# Patient Record
Sex: MALE | Race: WHITE | NOT HISPANIC OR LATINO | Employment: OTHER | URBAN - METROPOLITAN AREA
[De-identification: names, ages, dates, MRNs, and addresses within clinical notes are randomized per-mention and may not be internally consistent; named-entity substitution may affect disease eponyms.]

---

## 2023-05-10 ENCOUNTER — HOSPITAL ENCOUNTER (OUTPATIENT)
Dept: GENERAL RADIOLOGY | Facility: HOSPITAL | Age: 78
Discharge: HOME | End: 2023-05-10
Payer: MEDICARE

## 2023-05-10 ENCOUNTER — LAB (OUTPATIENT)
Dept: LAB | Facility: HOSPITAL | Age: 78
End: 2023-05-10
Payer: MEDICARE

## 2023-05-10 ENCOUNTER — HOSPITAL ENCOUNTER (OUTPATIENT)
Dept: CARDIOLOGY | Facility: HOSPITAL | Age: 78
Discharge: HOME | End: 2023-05-10
Payer: MEDICARE

## 2023-05-10 ENCOUNTER — TRANSCRIBE ORDERS (OUTPATIENT)
Dept: ADMINISTRATIVE | Facility: HOSPITAL | Age: 78
End: 2023-05-10
Payer: MEDICARE

## 2023-05-10 DIAGNOSIS — Z01.818 PRE-OP TESTING: ICD-10-CM

## 2023-05-10 DIAGNOSIS — Z01.818 PRE-OP TESTING: Primary | ICD-10-CM

## 2023-05-10 LAB
ALBUMIN SERPL-MCNC: 4 G/DL (ref 3.5–5.2)
ALBUMIN/GLOB SERPL: 1.4 G/DL
ALP SERPL-CCNC: 71 U/L (ref 39–117)
ALT SERPL W P-5'-P-CCNC: 27 U/L (ref 1–41)
ANION GAP SERPL CALCULATED.3IONS-SCNC: 10.1 MMOL/L (ref 5–15)
AST SERPL-CCNC: 25 U/L (ref 1–40)
BACTERIA UR QL AUTO: ABNORMAL /HPF
BASOPHILS # BLD AUTO: 0.03 10*3/MM3 (ref 0–0.2)
BASOPHILS NFR BLD AUTO: 0.3 % (ref 0–1.5)
BILIRUB SERPL-MCNC: 0.7 MG/DL (ref 0–1.2)
BILIRUB UR QL STRIP: NEGATIVE
BUN SERPL-MCNC: 17 MG/DL (ref 8–23)
BUN/CREAT SERPL: 15 (ref 7–25)
CALCIUM SPEC-SCNC: 9.9 MG/DL (ref 8.6–10.5)
CHLORIDE SERPL-SCNC: 106 MMOL/L (ref 98–107)
CLARITY UR: CLEAR
CO2 SERPL-SCNC: 26.9 MMOL/L (ref 22–29)
COLOR UR: YELLOW
CREAT SERPL-MCNC: 1.13 MG/DL (ref 0.76–1.27)
DEPRECATED RDW RBC AUTO: 43.4 FL (ref 37–54)
EGFRCR SERPLBLD CKD-EPI 2021: 66.9 ML/MIN/1.73
EOSINOPHIL # BLD AUTO: 0.11 10*3/MM3 (ref 0–0.4)
EOSINOPHIL NFR BLD AUTO: 1.2 % (ref 0.3–6.2)
ERYTHROCYTE [DISTWIDTH] IN BLOOD BY AUTOMATED COUNT: 12.7 % (ref 12.3–15.4)
GLOBULIN UR ELPH-MCNC: 2.8 GM/DL
GLUCOSE SERPL-MCNC: 97 MG/DL (ref 65–99)
GLUCOSE UR STRIP-MCNC: NEGATIVE MG/DL
HCT VFR BLD AUTO: 43.7 % (ref 37.5–51)
HGB BLD-MCNC: 14.6 G/DL (ref 13–17.7)
HGB UR QL STRIP.AUTO: NEGATIVE
HYALINE CASTS UR QL AUTO: ABNORMAL /LPF
IMM GRANULOCYTES # BLD AUTO: 0.05 10*3/MM3 (ref 0–0.05)
IMM GRANULOCYTES NFR BLD AUTO: 0.5 % (ref 0–0.5)
KETONES UR QL STRIP: ABNORMAL
LEUKOCYTE ESTERASE UR QL STRIP.AUTO: ABNORMAL
LYMPHOCYTES # BLD AUTO: 2.6 10*3/MM3 (ref 0.7–3.1)
LYMPHOCYTES NFR BLD AUTO: 27.4 % (ref 19.6–45.3)
MCH RBC QN AUTO: 30.9 PG (ref 26.6–33)
MCHC RBC AUTO-ENTMCNC: 33.4 G/DL (ref 31.5–35.7)
MCV RBC AUTO: 92.6 FL (ref 79–97)
MONOCYTES # BLD AUTO: 0.72 10*3/MM3 (ref 0.1–0.9)
MONOCYTES NFR BLD AUTO: 7.6 % (ref 5–12)
NEUTROPHILS NFR BLD AUTO: 5.98 10*3/MM3 (ref 1.7–7)
NEUTROPHILS NFR BLD AUTO: 63 % (ref 42.7–76)
NITRITE UR QL STRIP: NEGATIVE
NRBC BLD AUTO-RTO: 0 /100 WBC (ref 0–0.2)
PH UR STRIP.AUTO: 6.5 [PH] (ref 5–8)
PLATELET # BLD AUTO: 265 10*3/MM3 (ref 140–450)
PMV BLD AUTO: 8.6 FL (ref 6–12)
POTASSIUM SERPL-SCNC: 4.4 MMOL/L (ref 3.5–5.2)
PROT SERPL-MCNC: 6.8 G/DL (ref 6–8.5)
PROT UR QL STRIP: ABNORMAL
QT INTERVAL: 391 MS
RBC # BLD AUTO: 4.72 10*6/MM3 (ref 4.14–5.8)
RBC # UR STRIP: ABNORMAL /HPF
REF LAB TEST METHOD: ABNORMAL
SODIUM SERPL-SCNC: 143 MMOL/L (ref 136–145)
SP GR UR STRIP: 1.02 (ref 1–1.03)
SQUAMOUS #/AREA URNS HPF: ABNORMAL /HPF
UROBILINOGEN UR QL STRIP: ABNORMAL
WBC # UR STRIP: ABNORMAL /HPF
WBC NRBC COR # BLD: 9.49 10*3/MM3 (ref 3.4–10.8)

## 2023-05-10 PROCEDURE — 80053 COMPREHEN METABOLIC PANEL: CPT

## 2023-05-10 PROCEDURE — 85025 COMPLETE CBC W/AUTO DIFF WBC: CPT

## 2023-05-10 PROCEDURE — 93005 ELECTROCARDIOGRAM TRACING: CPT | Performed by: ORTHOPAEDIC SURGERY

## 2023-05-10 PROCEDURE — 36415 COLL VENOUS BLD VENIPUNCTURE: CPT

## 2023-05-10 PROCEDURE — 71046 X-RAY EXAM CHEST 2 VIEWS: CPT

## 2023-05-10 PROCEDURE — 81001 URINALYSIS AUTO W/SCOPE: CPT

## 2023-10-18 RX ORDER — LANOLIN ALCOHOL/MO/W.PET/CERES
1000 CREAM (GRAM) TOPICAL DAILY
COMMUNITY

## 2023-10-18 RX ORDER — LOSARTAN POTASSIUM 50 MG/1
100 TABLET ORAL DAILY
COMMUNITY

## 2023-10-18 RX ORDER — FINASTERIDE 5 MG/1
5 TABLET, FILM COATED ORAL DAILY
COMMUNITY

## 2023-10-19 ENCOUNTER — HOSPITAL ENCOUNTER (OUTPATIENT)
Dept: CARDIOLOGY | Facility: HOSPITAL | Age: 78
Discharge: HOME | End: 2023-10-19
Payer: MEDICARE

## 2023-10-19 ENCOUNTER — LAB (OUTPATIENT)
Dept: LAB | Facility: HOSPITAL | Age: 78
End: 2023-10-19
Payer: MEDICARE

## 2023-10-19 LAB
ANION GAP SERPL CALCULATED.3IONS-SCNC: 10 MMOL/L (ref 5–15)
BUN SERPL-MCNC: 16 MG/DL (ref 8–23)
BUN/CREAT SERPL: 13.4 (ref 7–25)
CALCIUM SPEC-SCNC: 9.7 MG/DL (ref 8.6–10.5)
CHLORIDE SERPL-SCNC: 106 MMOL/L (ref 98–107)
CO2 SERPL-SCNC: 27 MMOL/L (ref 22–29)
CREAT SERPL-MCNC: 1.19 MG/DL (ref 0.76–1.27)
DEPRECATED RDW RBC AUTO: 41.3 FL (ref 37–54)
EGFRCR SERPLBLD CKD-EPI 2021: 62.9 ML/MIN/1.73
ERYTHROCYTE [DISTWIDTH] IN BLOOD BY AUTOMATED COUNT: 12.5 % (ref 12.3–15.4)
GLUCOSE SERPL-MCNC: 100 MG/DL (ref 65–99)
HCT VFR BLD AUTO: 47.2 % (ref 37.5–51)
HGB BLD-MCNC: 16.3 G/DL (ref 13–17.7)
MCH RBC QN AUTO: 31.3 PG (ref 26.6–33)
MCHC RBC AUTO-ENTMCNC: 34.5 G/DL (ref 31.5–35.7)
MCV RBC AUTO: 90.8 FL (ref 79–97)
PLATELET # BLD AUTO: 273 10*3/MM3 (ref 140–450)
PMV BLD AUTO: 9.1 FL (ref 6–12)
POTASSIUM SERPL-SCNC: 4.4 MMOL/L (ref 3.5–5.2)
RBC # BLD AUTO: 5.2 10*6/MM3 (ref 4.14–5.8)
SODIUM SERPL-SCNC: 143 MMOL/L (ref 136–145)
WBC NRBC COR # BLD: 8.89 10*3/MM3 (ref 3.4–10.8)

## 2023-10-19 PROCEDURE — 93005 ELECTROCARDIOGRAM TRACING: CPT | Performed by: UROLOGY

## 2023-10-19 PROCEDURE — 80048 BASIC METABOLIC PNL TOTAL CA: CPT

## 2023-10-19 PROCEDURE — 85027 COMPLETE CBC AUTOMATED: CPT

## 2023-10-20 LAB
QT INTERVAL: 400 MS
QTC INTERVAL: 424 MS

## 2023-10-30 ENCOUNTER — ANESTHESIA EVENT (OUTPATIENT)
Dept: PERIOP | Facility: HOSPITAL | Age: 78
End: 2023-10-30
Payer: MEDICARE

## 2023-10-30 ENCOUNTER — ANESTHESIA (OUTPATIENT)
Dept: PERIOP | Facility: HOSPITAL | Age: 78
End: 2023-10-30
Payer: MEDICARE

## 2023-10-30 ENCOUNTER — HOSPITAL ENCOUNTER (OUTPATIENT)
Facility: HOSPITAL | Age: 78
Discharge: HOME | End: 2023-10-31
Attending: UROLOGY | Admitting: UROLOGY
Payer: MEDICARE

## 2023-10-30 DIAGNOSIS — N40.1 BENIGN LOCALIZED PROSTATIC HYPERPLASIA WITH LOWER URINARY TRACT SYMPTOMS (LUTS): ICD-10-CM

## 2023-10-30 DIAGNOSIS — N21.0 CALCULUS, BLADDER: Primary | ICD-10-CM

## 2023-10-30 DIAGNOSIS — N21.0 CALCULUS IN BLADDER: ICD-10-CM

## 2023-10-30 PROBLEM — N13.8 BPH WITH URINARY OBSTRUCTION: Status: ACTIVE | Noted: 2023-10-30

## 2023-10-30 LAB
ABO GROUP BLD: NORMAL
BLD GP AB SCN SERPL QL: NEGATIVE
RH BLD: NEGATIVE
T&S EXPIRATION DATE: NORMAL

## 2023-10-30 PROCEDURE — 25010000002 PROPOFOL 200 MG/20ML EMULSION: Performed by: ANESTHESIOLOGIST ASSISTANT

## 2023-10-30 PROCEDURE — 25810000003 LACTATED RINGERS PER 1000 ML: Performed by: UROLOGY

## 2023-10-30 PROCEDURE — 25010000002 HYDROMORPHONE 1 MG/ML SOLUTION: Performed by: ANESTHESIOLOGIST ASSISTANT

## 2023-10-30 PROCEDURE — G0378 HOSPITAL OBSERVATION PER HR: HCPCS

## 2023-10-30 PROCEDURE — 86901 BLOOD TYPING SEROLOGIC RH(D): CPT

## 2023-10-30 PROCEDURE — 86900 BLOOD TYPING SEROLOGIC ABO: CPT

## 2023-10-30 PROCEDURE — 88305 TISSUE EXAM BY PATHOLOGIST: CPT | Performed by: UROLOGY

## 2023-10-30 PROCEDURE — 25010000002 DEXAMETHASONE PER 1 MG: Performed by: ANESTHESIOLOGIST ASSISTANT

## 2023-10-30 PROCEDURE — 25010000002 CEFTRIAXONE PER 250 MG: Performed by: UROLOGY

## 2023-10-30 PROCEDURE — 86850 RBC ANTIBODY SCREEN: CPT | Performed by: UROLOGY

## 2023-10-30 PROCEDURE — 86900 BLOOD TYPING SEROLOGIC ABO: CPT | Performed by: UROLOGY

## 2023-10-30 PROCEDURE — 25810000003 SODIUM CHLORIDE 0.9 % SOLUTION: Performed by: UROLOGY

## 2023-10-30 PROCEDURE — 25010000002 CEFAZOLIN PER 500 MG: Performed by: ANESTHESIOLOGIST ASSISTANT

## 2023-10-30 PROCEDURE — 25010000002 ONDANSETRON PER 1 MG: Performed by: ANESTHESIOLOGIST ASSISTANT

## 2023-10-30 PROCEDURE — 25010000002 FENTANYL CITRATE (PF) 100 MCG/2ML SOLUTION: Performed by: ANESTHESIOLOGIST ASSISTANT

## 2023-10-30 PROCEDURE — 86901 BLOOD TYPING SEROLOGIC RH(D): CPT | Performed by: UROLOGY

## 2023-10-30 RX ORDER — NALOXONE HCL 0.4 MG/ML
0.1 VIAL (ML) INJECTION
Status: DISCONTINUED | OUTPATIENT
Start: 2023-10-30 | End: 2023-10-31 | Stop reason: HOSPADM

## 2023-10-30 RX ORDER — SODIUM CHLORIDE 9 MG/ML
100 INJECTION, SOLUTION INTRAVENOUS CONTINUOUS
Status: DISCONTINUED | OUTPATIENT
Start: 2023-10-30 | End: 2023-10-31 | Stop reason: HOSPADM

## 2023-10-30 RX ORDER — MEPERIDINE HYDROCHLORIDE 25 MG/ML
12.5 INJECTION INTRAMUSCULAR; INTRAVENOUS; SUBCUTANEOUS
Status: DISCONTINUED | OUTPATIENT
Start: 2023-10-30 | End: 2023-10-30 | Stop reason: HOSPADM

## 2023-10-30 RX ORDER — HYDROCODONE BITARTRATE AND ACETAMINOPHEN 10; 325 MG/1; MG/1
1 TABLET ORAL EVERY 4 HOURS PRN
Status: DISCONTINUED | OUTPATIENT
Start: 2023-10-30 | End: 2023-10-30 | Stop reason: HOSPADM

## 2023-10-30 RX ORDER — ACETAMINOPHEN 325 MG/1
650 TABLET ORAL EVERY 4 HOURS PRN
Status: DISCONTINUED | OUTPATIENT
Start: 2023-10-30 | End: 2023-10-31 | Stop reason: HOSPADM

## 2023-10-30 RX ORDER — ONDANSETRON 2 MG/ML
INJECTION INTRAMUSCULAR; INTRAVENOUS AS NEEDED
Status: DISCONTINUED | OUTPATIENT
Start: 2023-10-30 | End: 2023-10-30 | Stop reason: SURG

## 2023-10-30 RX ORDER — DIPHENHYDRAMINE HCL 25 MG
25 CAPSULE ORAL
Status: DISCONTINUED | OUTPATIENT
Start: 2023-10-30 | End: 2023-10-30 | Stop reason: HOSPADM

## 2023-10-30 RX ORDER — PROMETHAZINE HYDROCHLORIDE 25 MG/1
25 SUPPOSITORY RECTAL ONCE AS NEEDED
Status: DISCONTINUED | OUTPATIENT
Start: 2023-10-30 | End: 2023-10-30 | Stop reason: HOSPADM

## 2023-10-30 RX ORDER — ONDANSETRON 2 MG/ML
4 INJECTION INTRAMUSCULAR; INTRAVENOUS ONCE AS NEEDED
Status: DISCONTINUED | OUTPATIENT
Start: 2023-10-30 | End: 2023-10-30 | Stop reason: HOSPADM

## 2023-10-30 RX ORDER — MIDAZOLAM HYDROCHLORIDE 1 MG/ML
2 INJECTION INTRAMUSCULAR; INTRAVENOUS
Status: DISCONTINUED | OUTPATIENT
Start: 2023-10-30 | End: 2023-10-30 | Stop reason: HOSPADM

## 2023-10-30 RX ORDER — SODIUM CHLORIDE 0.9 % (FLUSH) 0.9 %
10 SYRINGE (ML) INJECTION AS NEEDED
Status: DISCONTINUED | OUTPATIENT
Start: 2023-10-30 | End: 2023-10-30 | Stop reason: HOSPADM

## 2023-10-30 RX ORDER — EPHEDRINE SULFATE 5 MG/ML
5 INJECTION INTRAVENOUS ONCE AS NEEDED
Status: DISCONTINUED | OUTPATIENT
Start: 2023-10-30 | End: 2023-10-30 | Stop reason: HOSPADM

## 2023-10-30 RX ORDER — PROPOFOL 10 MG/ML
INJECTION, EMULSION INTRAVENOUS AS NEEDED
Status: DISCONTINUED | OUTPATIENT
Start: 2023-10-30 | End: 2023-10-30 | Stop reason: SURG

## 2023-10-30 RX ORDER — SODIUM CHLORIDE 0.9 % (FLUSH) 0.9 %
10 SYRINGE (ML) INJECTION EVERY 12 HOURS SCHEDULED
Status: DISCONTINUED | OUTPATIENT
Start: 2023-10-30 | End: 2023-10-31 | Stop reason: HOSPADM

## 2023-10-30 RX ORDER — SODIUM CHLORIDE 9 MG/ML
40 INJECTION, SOLUTION INTRAVENOUS AS NEEDED
Status: DISCONTINUED | OUTPATIENT
Start: 2023-10-30 | End: 2023-10-31 | Stop reason: HOSPADM

## 2023-10-30 RX ORDER — HYDRALAZINE HYDROCHLORIDE 20 MG/ML
5 INJECTION INTRAMUSCULAR; INTRAVENOUS
Status: DISCONTINUED | OUTPATIENT
Start: 2023-10-30 | End: 2023-10-30 | Stop reason: HOSPADM

## 2023-10-30 RX ORDER — DEXAMETHASONE SODIUM PHOSPHATE 4 MG/ML
INJECTION, SOLUTION INTRA-ARTICULAR; INTRALESIONAL; INTRAMUSCULAR; INTRAVENOUS; SOFT TISSUE AS NEEDED
Status: DISCONTINUED | OUTPATIENT
Start: 2023-10-30 | End: 2023-10-30 | Stop reason: SURG

## 2023-10-30 RX ORDER — ONDANSETRON 4 MG/1
4 TABLET, FILM COATED ORAL EVERY 6 HOURS PRN
Status: DISCONTINUED | OUTPATIENT
Start: 2023-10-30 | End: 2023-10-31 | Stop reason: HOSPADM

## 2023-10-30 RX ORDER — PROMETHAZINE HYDROCHLORIDE 25 MG/1
25 TABLET ORAL ONCE AS NEEDED
Status: DISCONTINUED | OUTPATIENT
Start: 2023-10-30 | End: 2023-10-30 | Stop reason: HOSPADM

## 2023-10-30 RX ORDER — CEFAZOLIN SODIUM 1 G/3ML
INJECTION, POWDER, FOR SOLUTION INTRAMUSCULAR; INTRAVENOUS AS NEEDED
Status: DISCONTINUED | OUTPATIENT
Start: 2023-10-30 | End: 2023-10-30 | Stop reason: SURG

## 2023-10-30 RX ORDER — SODIUM CHLORIDE, SODIUM LACTATE, POTASSIUM CHLORIDE, CALCIUM CHLORIDE 600; 310; 30; 20 MG/100ML; MG/100ML; MG/100ML; MG/100ML
1000 INJECTION, SOLUTION INTRAVENOUS CONTINUOUS
Status: DISCONTINUED | OUTPATIENT
Start: 2023-10-30 | End: 2023-10-31 | Stop reason: HOSPADM

## 2023-10-30 RX ORDER — ACETAMINOPHEN 650 MG/1
650 SUPPOSITORY RECTAL EVERY 4 HOURS PRN
Status: DISCONTINUED | OUTPATIENT
Start: 2023-10-30 | End: 2023-10-31 | Stop reason: HOSPADM

## 2023-10-30 RX ORDER — ACETAMINOPHEN 325 MG/1
650 TABLET ORAL ONCE AS NEEDED
Status: DISCONTINUED | OUTPATIENT
Start: 2023-10-30 | End: 2023-10-31 | Stop reason: HOSPADM

## 2023-10-30 RX ORDER — LABETALOL HYDROCHLORIDE 5 MG/ML
5 INJECTION, SOLUTION INTRAVENOUS
Status: DISCONTINUED | OUTPATIENT
Start: 2023-10-30 | End: 2023-10-30 | Stop reason: HOSPADM

## 2023-10-30 RX ORDER — FENTANYL CITRATE 50 UG/ML
INJECTION, SOLUTION INTRAMUSCULAR; INTRAVENOUS AS NEEDED
Status: DISCONTINUED | OUTPATIENT
Start: 2023-10-30 | End: 2023-10-30 | Stop reason: SURG

## 2023-10-30 RX ORDER — SODIUM CHLORIDE 0.9 % (FLUSH) 0.9 %
10 SYRINGE (ML) INJECTION AS NEEDED
Status: DISCONTINUED | OUTPATIENT
Start: 2023-10-30 | End: 2023-10-31 | Stop reason: HOSPADM

## 2023-10-30 RX ORDER — DIPHENHYDRAMINE HYDROCHLORIDE 50 MG/ML
12.5 INJECTION INTRAMUSCULAR; INTRAVENOUS
Status: DISCONTINUED | OUTPATIENT
Start: 2023-10-30 | End: 2023-10-30 | Stop reason: HOSPADM

## 2023-10-30 RX ORDER — LOSARTAN POTASSIUM 50 MG/1
100 TABLET ORAL DAILY
Status: DISCONTINUED | OUTPATIENT
Start: 2023-10-31 | End: 2023-10-31 | Stop reason: HOSPADM

## 2023-10-30 RX ORDER — HYDROCODONE BITARTRATE AND ACETAMINOPHEN 7.5; 325 MG/1; MG/1
1 TABLET ORAL EVERY 4 HOURS PRN
Status: DISCONTINUED | OUTPATIENT
Start: 2023-10-30 | End: 2023-10-31 | Stop reason: HOSPADM

## 2023-10-30 RX ORDER — LORAZEPAM 2 MG/ML
1 INJECTION INTRAMUSCULAR
Status: DISCONTINUED | OUTPATIENT
Start: 2023-10-30 | End: 2023-10-30 | Stop reason: HOSPADM

## 2023-10-30 RX ORDER — LIDOCAINE HYDROCHLORIDE 10 MG/ML
INJECTION, SOLUTION EPIDURAL; INFILTRATION; INTRACAUDAL; PERINEURAL AS NEEDED
Status: DISCONTINUED | OUTPATIENT
Start: 2023-10-30 | End: 2023-10-30 | Stop reason: SURG

## 2023-10-30 RX ORDER — FINASTERIDE 5 MG/1
5 TABLET, FILM COATED ORAL DAILY
Status: DISCONTINUED | OUTPATIENT
Start: 2023-10-30 | End: 2023-10-31 | Stop reason: HOSPADM

## 2023-10-30 RX ORDER — FENTANYL CITRATE 50 UG/ML
50 INJECTION, SOLUTION INTRAMUSCULAR; INTRAVENOUS
Status: DISCONTINUED | OUTPATIENT
Start: 2023-10-30 | End: 2023-10-30 | Stop reason: HOSPADM

## 2023-10-30 RX ORDER — DOCUSATE SODIUM 100 MG/1
100 CAPSULE, LIQUID FILLED ORAL 2 TIMES DAILY PRN
Status: DISCONTINUED | OUTPATIENT
Start: 2023-10-30 | End: 2023-10-31 | Stop reason: HOSPADM

## 2023-10-30 RX ORDER — ONDANSETRON 2 MG/ML
4 INJECTION INTRAMUSCULAR; INTRAVENOUS EVERY 6 HOURS PRN
Status: DISCONTINUED | OUTPATIENT
Start: 2023-10-30 | End: 2023-10-31 | Stop reason: HOSPADM

## 2023-10-30 RX ORDER — IPRATROPIUM BROMIDE AND ALBUTEROL SULFATE 2.5; .5 MG/3ML; MG/3ML
3 SOLUTION RESPIRATORY (INHALATION) ONCE AS NEEDED
Status: DISCONTINUED | OUTPATIENT
Start: 2023-10-30 | End: 2023-10-30 | Stop reason: HOSPADM

## 2023-10-30 RX ORDER — DIPHENHYDRAMINE HYDROCHLORIDE 50 MG/ML
12.5 INJECTION INTRAMUSCULAR; INTRAVENOUS ONCE AS NEEDED
Status: DISCONTINUED | OUTPATIENT
Start: 2023-10-30 | End: 2023-10-30 | Stop reason: HOSPADM

## 2023-10-30 RX ORDER — NALOXONE HCL 0.4 MG/ML
0.4 VIAL (ML) INJECTION AS NEEDED
Status: DISCONTINUED | OUTPATIENT
Start: 2023-10-30 | End: 2023-10-30 | Stop reason: HOSPADM

## 2023-10-30 RX ADMIN — HYDROCODONE BITARTRATE AND ACETAMINOPHEN 1 TABLET: 7.5; 325 TABLET ORAL at 19:34

## 2023-10-30 RX ADMIN — HYDROMORPHONE HYDROCHLORIDE 1 MG: 1 INJECTION, SOLUTION INTRAMUSCULAR; INTRAVENOUS; SUBCUTANEOUS at 11:03

## 2023-10-30 RX ADMIN — PROPOFOL 200 MG: 10 INJECTION, EMULSION INTRAVENOUS at 09:27

## 2023-10-30 RX ADMIN — Medication 10 ML: at 19:40

## 2023-10-30 RX ADMIN — SODIUM CHLORIDE, POTASSIUM CHLORIDE, SODIUM LACTATE AND CALCIUM CHLORIDE 1000 ML: 600; 310; 30; 20 INJECTION, SOLUTION INTRAVENOUS at 08:08

## 2023-10-30 RX ADMIN — SODIUM CHLORIDE 100 ML/HR: 9 INJECTION, SOLUTION INTRAVENOUS at 17:29

## 2023-10-30 RX ADMIN — DEXAMETHASONE SODIUM PHOSPHATE 8 MG: 4 INJECTION, SOLUTION INTRAMUSCULAR; INTRAVENOUS at 09:38

## 2023-10-30 RX ADMIN — HYDROMORPHONE HYDROCHLORIDE 0.5 MG: 1 INJECTION, SOLUTION INTRAMUSCULAR; INTRAVENOUS; SUBCUTANEOUS at 11:52

## 2023-10-30 RX ADMIN — CEFTRIAXONE 2000 MG: 2 INJECTION, POWDER, FOR SOLUTION INTRAMUSCULAR; INTRAVENOUS at 18:06

## 2023-10-30 RX ADMIN — LIDOCAINE HYDROCHLORIDE 50 MG: 10 INJECTION, SOLUTION EPIDURAL; INFILTRATION; INTRACAUDAL; PERINEURAL at 09:27

## 2023-10-30 RX ADMIN — ONDANSETRON 4 MG: 2 INJECTION INTRAMUSCULAR; INTRAVENOUS at 09:38

## 2023-10-30 RX ADMIN — CEFAZOLIN 2 G: 1 INJECTION, POWDER, FOR SOLUTION INTRAMUSCULAR; INTRAVENOUS at 09:27

## 2023-10-30 RX ADMIN — FENTANYL CITRATE 100 MCG: 50 INJECTION, SOLUTION INTRAMUSCULAR; INTRAVENOUS at 09:27

## 2023-10-30 NOTE — ANESTHESIA POSTPROCEDURE EVALUATION
Patient: Mango Ling    Procedure Summary       Date: 10/30/23 Room / Location: [facility] OR 01 / [facility] MAIN OR    Anesthesia Start: 0921 Anesthesia Stop: 1127    Procedures:       CYSTOSCOPY LITHOLAPAXY BLADDER STONE EXTRACTION      CYSTOSCOPY TRANSURETHRAL RESECTION OF PROSTATE Diagnosis:       Calculus in bladder      Benign localized prostatic hyperplasia with lower urinary tract symptoms (LUTS)      (Calculus in bladder [N21.0])      (Benign localized prostatic hyperplasia with lower urinary tract symptoms (LUTS) [N40.1])    Surgeons: Ryan Romero MD Provider: Angelo Gutierrez MD    Anesthesia Type: general ASA Status: 2            Anesthesia Type: general    Vitals  Vitals Value Taken Time   /92 10/30/23 1245   Temp 97.7 °F (36.5 °C) 10/30/23 1130   Pulse 57 10/30/23 1251   Resp 9 10/30/23 1245   SpO2 96 % 10/30/23 1251   Vitals shown include unfiled device data.        Post Anesthesia Care and Evaluation    Patient location during evaluation: PACU  Patient participation: complete - patient participated  Level of consciousness: awake  Pain scale: See nurse's notes for pain score.  Pain management: adequate    Airway patency: patent  Anesthetic complications: No anesthetic complications  PONV Status: none  Cardiovascular status: acceptable  Respiratory status: acceptable and spontaneous ventilation  Hydration status: acceptable    Comments: Patient seen and examined postoperatively; vital signs stable; SpO2 greater than or equal to 90%; cardiopulmonary status stable; nausea/vomiting adequately controlled; pain adequately controlled; no apparent anesthesia complications; patient discharged from anesthesia care when discharge criteria were met

## 2023-10-30 NOTE — OP NOTE
Urology Operative Note    10/30/2023    Mango Ling  77 y.o.  1945  male  [id number]      Surgeon(s) and Role:  Ryan Romero MD - Primary     Preoperative Diagnosis: BPH with obstruction, 2 large bladder stones greater than 3 cm in total    Postoperative Diagnosis: Same    Complications: None    Procedures:    Cystolitholapaxy large greater than 3 cm  Transurethral resection of prostate    Indications   Mango Ling is a 77 y.o. male who presents with BPH with obstruction and large bladder stones.  He was worked up for this and described all options for further management.    During the informed consent process, the procedure was discussed in detail including but not limited to the risks of bleeding, infection, and damage to surrounding structures.    Description of procedure:  The patient was properly identified in the preoperative holding area and taken to the operating room where general anesthesia was induced. The patient was prepped and draped in a sterile fashion. The patient was given antibiotics intravenously before the start of the surgery. After ensuring that all of the required equipment was ready and available a surgical timeout was performed.     The 21 Fr rigid cystoscope was placed and pancystoscopy was performed.   The UOs were visualized and were safely away from the bladder neck.  No bladder tumors seen.  The bladder was left full.  The obturator sheath was then placed into the bladder and the bladder drained.  The resectoscope was then placed for continuous flow during laser litholapaxy.  The thousand laser fiber was used and took a considerable amount of time to break the stones into small pieces to irrigate out.  Approximately 45 minutes for the first stone and 10 minutes for the next stone.  Bladder was then irrigated out and no additional stones were seen.  27 [place] loop was then placed.  The trigone was marked on each side with cautery to avoid injury.  Resection was  initiated on the median lobe which was fully resected from 5 to 7 o'clock down to the capsule and out to but not past the verumontanum.  Hemostasis was obtained.  I then resected a single trough at the 2 o'clock position and carried it deep to the capsule and out to the verumontanum.  The remainder of the left lobe was resected fully then by connecting the two previous resection sites.  All chips were removed and hemostasis was obtained.  An identical resection was performed on the right lateral lobe without difficulty.  Anterior lobe tissue was then resected to complete the open channel.  Careful hemostasis was obtained and all chips removed.  A 24 Fr three-way Story was placed on traction and the bladder irrigated repeatedly until the urine was clear.  CBI was initiated in the OR.    There were no apparent complications. The patient woke up in the operating room and was taken to the recovery room in stable condition.     I was present and scrubbed for the entire procedure.     Specimens: Prostate chips    Estimated Blood Loss: Less than 25 mL      Plan   -Void trial in the morning         Ryan Romero MD  [facility]  [facility] [facility], [address]  [place] [address] [phone]

## 2023-10-30 NOTE — ANESTHESIA PROCEDURE NOTES
Airway  Date/Time: 10/30/2023 9:28 AM    General Information and Staff    Anesthesiologist: Angelo Gutierrez MD  CRNA/CAA: David Forman CAA    Indications and Patient Condition  Indications for airway management: airway protection    Preoxygenated: yes  MILS maintained throughout  Mask difficulty assessment: 0 - not attempted    Final Airway Details  Final airway type: supraglottic airway      Successful airway: [place]  Size 4     Cormack-Lehane Classification: grade I - full view of glottis  Assessment: atraumatic intubation

## 2023-10-30 NOTE — ANESTHESIA PREPROCEDURE EVALUATION
Anesthesia Evaluation     Patient summary reviewed and Nursing notes reviewed   NPO Solid Status: > 8 hours  NPO Liquid Status: > 8 hours           Airway   Mallampati: II  TM distance: >3 FB  Neck ROM: full  No difficulty expected  Dental - normal exam     Pulmonary    (+) ,sleep apnea  Cardiovascular     (+) hypertension      Neuro/Psych  GI/Hepatic/Renal/Endo      Musculoskeletal     Abdominal    Substance History      OB/GYN          Other                    Anesthesia Plan    ASA 2     general     intravenous induction     Anesthetic plan, risks, benefits, and alternatives have been provided, discussed and informed consent has been obtained with: patient.    Plan discussed with CAA.    CODE STATUS:

## 2023-10-31 VITALS
DIASTOLIC BLOOD PRESSURE: 82 MMHG | TEMPERATURE: 98 F | HEIGHT: 70 IN | OXYGEN SATURATION: 96 % | WEIGHT: 235.6 LBS | RESPIRATION RATE: 18 BRPM | HEART RATE: 57 BPM | SYSTOLIC BLOOD PRESSURE: 160 MMHG | BODY MASS INDEX: 33.73 KG/M2

## 2023-10-31 PROBLEM — N21.0 CALCULUS, BLADDER: Status: ACTIVE | Noted: 2023-10-31

## 2023-10-31 LAB
ANION GAP SERPL CALCULATED.3IONS-SCNC: 10 MMOL/L (ref 5–15)
BASOPHILS # BLD AUTO: 0.1 10*3/MM3 (ref 0–0.2)
BASOPHILS NFR BLD AUTO: 0.6 % (ref 0–1.5)
BUN SERPL-MCNC: 15 MG/DL (ref 8–23)
BUN/CREAT SERPL: 14.6 (ref 7–25)
CALCIUM SPEC-SCNC: 8.8 MG/DL (ref 8.6–10.5)
CHLORIDE SERPL-SCNC: 104 MMOL/L (ref 98–107)
CO2 SERPL-SCNC: 25 MMOL/L (ref 22–29)
CREAT SERPL-MCNC: 1.03 MG/DL (ref 0.76–1.27)
DEPRECATED RDW RBC AUTO: 47.3 FL (ref 37–54)
EGFRCR SERPLBLD CKD-EPI 2021: 74.8 ML/MIN/1.73
EOSINOPHIL # BLD AUTO: 0 10*3/MM3 (ref 0–0.4)
EOSINOPHIL NFR BLD AUTO: 0 % (ref 0.3–6.2)
ERYTHROCYTE [DISTWIDTH] IN BLOOD BY AUTOMATED COUNT: 13.9 % (ref 12.3–15.4)
GLUCOSE SERPL-MCNC: 133 MG/DL (ref 65–99)
HCT VFR BLD AUTO: 43.7 % (ref 37.5–51)
HGB BLD-MCNC: 14.8 G/DL (ref 13–17.7)
LAB AP CASE REPORT: NORMAL
LYMPHOCYTES # BLD AUTO: 1.2 10*3/MM3 (ref 0.7–3.1)
LYMPHOCYTES NFR BLD AUTO: 9.7 % (ref 19.6–45.3)
MCH RBC QN AUTO: 31.2 PG (ref 26.6–33)
MCHC RBC AUTO-ENTMCNC: 33.8 G/DL (ref 31.5–35.7)
MCV RBC AUTO: 92.2 FL (ref 79–97)
MONOCYTES # BLD AUTO: 0.5 10*3/MM3 (ref 0.1–0.9)
MONOCYTES NFR BLD AUTO: 3.7 % (ref 5–12)
NEUTROPHILS NFR BLD AUTO: 10.6 10*3/MM3 (ref 1.7–7)
NEUTROPHILS NFR BLD AUTO: 86 % (ref 42.7–76)
NRBC BLD AUTO-RTO: 0.1 /100 WBC (ref 0–0.2)
PATH REPORT.FINAL DX SPEC: NORMAL
PATH REPORT.GROSS SPEC: NORMAL
PLATELET # BLD AUTO: 276 10*3/MM3 (ref 140–450)
PMV BLD AUTO: 7 FL (ref 6–12)
POTASSIUM SERPL-SCNC: 4.4 MMOL/L (ref 3.5–5.2)
RBC # BLD AUTO: 4.74 10*6/MM3 (ref 4.14–5.8)
SODIUM SERPL-SCNC: 139 MMOL/L (ref 136–145)
WBC NRBC COR # BLD: 12.3 10*3/MM3 (ref 3.4–10.8)

## 2023-10-31 PROCEDURE — 80048 BASIC METABOLIC PNL TOTAL CA: CPT | Performed by: UROLOGY

## 2023-10-31 PROCEDURE — 63710000001 FINASTERIDE 5 MG TABLET: Performed by: UROLOGY

## 2023-10-31 PROCEDURE — 85025 COMPLETE CBC W/AUTO DIFF WBC: CPT | Performed by: UROLOGY

## 2023-10-31 PROCEDURE — 63710000001 LOSARTAN 50 MG TABLET: Performed by: UROLOGY

## 2023-10-31 PROCEDURE — A9270 NON-COVERED ITEM OR SERVICE: HCPCS | Performed by: UROLOGY

## 2023-10-31 PROCEDURE — G0378 HOSPITAL OBSERVATION PER HR: HCPCS

## 2023-10-31 RX ORDER — DOCUSATE SODIUM 100 MG/1
100 CAPSULE, LIQUID FILLED ORAL 2 TIMES DAILY PRN
Qty: 30 CAPSULE | Refills: 1 | Status: SHIPPED | OUTPATIENT
Start: 2023-10-31

## 2023-10-31 RX ORDER — NITROFURANTOIN 25; 75 MG/1; MG/1
100 CAPSULE ORAL 2 TIMES DAILY
Qty: 14 CAPSULE | Refills: 0 | Status: SHIPPED | OUTPATIENT
Start: 2023-10-31

## 2023-10-31 RX ORDER — PHENAZOPYRIDINE HYDROCHLORIDE 100 MG/1
100 TABLET, FILM COATED ORAL 3 TIMES DAILY PRN
Qty: 15 TABLET | Refills: 0 | Status: SHIPPED | OUTPATIENT
Start: 2023-10-31

## 2023-10-31 RX ORDER — TRAMADOL HYDROCHLORIDE 50 MG/1
50 TABLET ORAL EVERY 6 HOURS PRN
Qty: 20 TABLET | Refills: 0 | Status: SHIPPED | OUTPATIENT
Start: 2023-10-31

## 2023-10-31 RX ADMIN — Medication 10 ML: at 08:24

## 2023-10-31 RX ADMIN — HYDROCODONE BITARTRATE AND ACETAMINOPHEN 1 TABLET: 7.5; 325 TABLET ORAL at 05:07

## 2023-10-31 RX ADMIN — FINASTERIDE 5 MG: 5 TABLET, FILM COATED ORAL at 08:24

## 2023-10-31 RX ADMIN — LOSARTAN POTASSIUM 100 MG: 50 TABLET, FILM COATED ORAL at 08:24

## 2023-10-31 NOTE — DISCHARGE SUMMARY
DISCHARGE SUMMARY    Ryan Romero MD      Date of Discharge:  10/31/2023    Discharge Diagnosis:   BPH with obstruction and bladder stone    Problem List:    BPH with urinary obstruction    Calculus, bladder      Presenting Problem/History of Present Illness  Calculus in bladder [N21.0]  Benign localized prostatic hyperplasia with lower urinary tract symptoms (LUTS) [N40.1]  BPH with urinary obstruction [N40.1, N13.8]  Calculus, bladder [N21.0]        Hospital Course  Patient is a 77 y.o. male admitted after cystolitholapaxy and TURP.  Urine cleared up overnight.  Labs within normal limits.  He was given a voiding trial and discharged    Procedures Performed  Procedure(s):  CYSTOSCOPY LITHOLAPAXY BLADDER STONE EXTRACTION  CYSTOSCOPY TRANSURETHRAL RESECTION OF PROSTATE         Pertinent Test Results: None    Condition on Discharge:  stable      Discharge Disposition: Home      Discharge Medications     Discharge Medications        New Medications        Instructions Start Date   docusate sodium 100 MG capsule  Commonly known as: Colace   100 mg, Oral, 2 Times Daily PRN      nitrofurantoin (macrocrystal-monohydrate) 100 MG capsule  Commonly known as: Macrobid   100 mg, Oral, 2 Times Daily      phenazopyridine 100 MG tablet  Commonly known as: Pyridium   100 mg, Oral, 3 Times Daily PRN      traMADol 50 MG tablet  Commonly known as: ULTRAM   50 mg, Oral, Every 6 Hours PRN             Continue These Medications        Instructions Start Date   finasteride 5 MG tablet  Commonly known as: PROSCAR   5 mg, Oral, Daily      losartan 50 MG tablet  Commonly known as: COZAAR   100 mg, Oral, Daily      vitamin B-12 1000 MCG tablet  Commonly known as: CYANOCOBALAMIN   1,000 mcg, Oral, Daily                 Follow-up Appointments  No future appointments.     Follow-up 1 week

## 2023-10-31 NOTE — PLAN OF CARE
Goal Outcome Evaluation:  Plan of Care Reviewed With: patient        Progress: no change  Outcome Evaluation: Pt VSS, Tolerating CBI well, Pain treated with PRN medication, Call light in reach, Plan on going

## 2023-10-31 NOTE — PLAN OF CARE
Goal Outcome Evaluation:     Pt is able to make needs known. No c/o pain throughout shift. Pt is able to ambulate with stand by assist. Education is complete, call light is in reach, and no other needs at this time. Continue to monitor.

## 2023-10-31 NOTE — CASE MANAGEMENT/SOCIAL WORK
Discharge Planning Assessment   Trevon     Patient Name: Mango Ling  MRN: [id number]  Today's Date: 10/31/2023    Admit Date: 10/30/2023    Plan: DC PLAN: Routine home.       Discharge Needs Assessment       Row Name 10/31/23 1016       Living Environment    People in Home spouse    Current Living Arrangements home    Potentially Unsafe Housing Conditions none    In the past 12 months has the electric, gas, oil, or water company threatened to shut off services in your home? No    Primary Care Provided by self    Able to Return to Prior Arrangements yes       Resource/Environmental Concerns    Resource/Environmental Concerns none       Food Insecurity    Within the past 12 months, you worried that your food would run out before you got the money to buy more. Never true    Within the past 12 months, the food you bought just didn't last and you didn't have money to get more. Never true       Transition Planning    Patient/Family Anticipates Transition to home with family    Patient/Family Anticipated Services at Transition none    Transportation Anticipated family or friend will provide       Discharge Needs Assessment    Equipment Currently Used at Home cane, straight                   Discharge Plan       Row Name 10/31/23 1017       Plan    Plan DC PLAN: Routine home.    Plan Comments Met with patient and family at bedside, from routine home. Independent with ADL's uses a cane occassionally. PCP is [facility], pharmacy is [facility]. Able to afford medications and denies any transportation issues, still drives or wife will provide. Denies any concerns about return home. Plans to discharge today.,                  Continued Care and Services - Admitted Since 10/30/2023    Coordination has not been started for this encounter.       Expected Discharge Date and Time       Expected Discharge Date Expected Discharge Time    Oct 31, 2023            Demographic Summary       Row Name 10/31/23 1016       General Information     Admission Type observation    Referral Source admission list    Reason for Consult discharge planning    Preferred Language English       Contact Information    Permission Granted to Share Info With     Contact Information Obtained for                    Functional Status       Row Name 10/31/23 1016       Functional Status    Usual Activity Tolerance moderate    Current Activity Tolerance moderate       Assessment of Health Literacy    How often do you have someone help you read hospital materials? Sometimes    How often do you have problems learning about your medical condition because of difficulty understanding written information? Sometimes    How often do you have a problem understanding what is told to you about your medical condition? Sometimes    How confident are you filling out medical forms by yourself? Somewhat    Health Literacy Moderate       Functional Status, IADL    Medications independent    Meal Preparation independent    Housekeeping independent    Laundry independent    Shopping independent       Mental Status    General Appearance WDL WDL                    Meagan Freed RN

## 2023-11-01 ENCOUNTER — HOSPITAL ENCOUNTER (EMERGENCY)
Facility: HOSPITAL | Age: 78
Discharge: HOME | End: 2023-11-01
Attending: EMERGENCY MEDICINE | Admitting: EMERGENCY MEDICINE
Payer: MEDICARE

## 2023-11-01 VITALS
BODY MASS INDEX: 34.43 KG/M2 | RESPIRATION RATE: 16 BRPM | DIASTOLIC BLOOD PRESSURE: 90 MMHG | HEART RATE: 74 BPM | WEIGHT: 240.52 LBS | HEIGHT: 70 IN | SYSTOLIC BLOOD PRESSURE: 154 MMHG | OXYGEN SATURATION: 98 % | TEMPERATURE: 97.8 F

## 2023-11-01 DIAGNOSIS — R30.0 DYSURIA: ICD-10-CM

## 2023-11-01 DIAGNOSIS — R33.8 ACUTE URINARY RETENTION: Primary | ICD-10-CM

## 2023-11-01 LAB
ANION GAP SERPL CALCULATED.3IONS-SCNC: 10 MMOL/L (ref 5–15)
BACTERIA UR QL AUTO: ABNORMAL /HPF
BASOPHILS # BLD AUTO: 0.2 10*3/MM3 (ref 0–0.2)
BASOPHILS NFR BLD AUTO: 1.1 % (ref 0–1.5)
BILIRUB UR QL STRIP: NEGATIVE
BUN SERPL-MCNC: 20 MG/DL (ref 8–23)
BUN/CREAT SERPL: 16.9 (ref 7–25)
CALCIUM SPEC-SCNC: 8.6 MG/DL (ref 8.6–10.5)
CHLORIDE SERPL-SCNC: 107 MMOL/L (ref 98–107)
CLARITY UR: ABNORMAL
CO2 SERPL-SCNC: 22 MMOL/L (ref 22–29)
COLOR UR: ABNORMAL
CREAT SERPL-MCNC: 1.18 MG/DL (ref 0.76–1.27)
DEPRECATED RDW RBC AUTO: 49 FL (ref 37–54)
EGFRCR SERPLBLD CKD-EPI 2021: 63.6 ML/MIN/1.73
EOSINOPHIL # BLD AUTO: 0.1 10*3/MM3 (ref 0–0.4)
EOSINOPHIL NFR BLD AUTO: 0.6 % (ref 0.3–6.2)
ERYTHROCYTE [DISTWIDTH] IN BLOOD BY AUTOMATED COUNT: 14.2 % (ref 12.3–15.4)
GLUCOSE SERPL-MCNC: 86 MG/DL (ref 65–99)
GLUCOSE UR STRIP-MCNC: NEGATIVE MG/DL
HCT VFR BLD AUTO: 42.1 % (ref 37.5–51)
HGB BLD-MCNC: 14.1 G/DL (ref 13–17.7)
HGB UR QL STRIP.AUTO: ABNORMAL
HYALINE CASTS UR QL AUTO: ABNORMAL /LPF
KETONES UR QL STRIP: NEGATIVE
LEUKOCYTE ESTERASE UR QL STRIP.AUTO: ABNORMAL
LYMPHOCYTES # BLD AUTO: 2.8 10*3/MM3 (ref 0.7–3.1)
LYMPHOCYTES NFR BLD AUTO: 18.8 % (ref 19.6–45.3)
MCH RBC QN AUTO: 31.4 PG (ref 26.6–33)
MCHC RBC AUTO-ENTMCNC: 33.5 G/DL (ref 31.5–35.7)
MCV RBC AUTO: 93.7 FL (ref 79–97)
MONOCYTES # BLD AUTO: 1.3 10*3/MM3 (ref 0.1–0.9)
MONOCYTES NFR BLD AUTO: 8.8 % (ref 5–12)
NEUTROPHILS NFR BLD AUTO: 10.6 10*3/MM3 (ref 1.7–7)
NEUTROPHILS NFR BLD AUTO: 70.7 % (ref 42.7–76)
NITRITE UR QL STRIP: POSITIVE
NRBC BLD AUTO-RTO: 0 /100 WBC (ref 0–0.2)
PH UR STRIP.AUTO: <=5 [PH] (ref 5–8)
PLATELET # BLD AUTO: 245 10*3/MM3 (ref 140–450)
PMV BLD AUTO: 6.9 FL (ref 6–12)
POTASSIUM SERPL-SCNC: 3.9 MMOL/L (ref 3.5–5.2)
PROT UR QL STRIP: ABNORMAL
RBC # BLD AUTO: 4.49 10*6/MM3 (ref 4.14–5.8)
RBC # UR STRIP: ABNORMAL /HPF
REF LAB TEST METHOD: ABNORMAL
SODIUM SERPL-SCNC: 139 MMOL/L (ref 136–145)
SP GR UR STRIP: 1.02 (ref 1–1.03)
SQUAMOUS #/AREA URNS HPF: ABNORMAL /HPF
UROBILINOGEN UR QL STRIP: ABNORMAL
WBC # UR STRIP: ABNORMAL /HPF
WBC NRBC COR # BLD: 14.9 10*3/MM3 (ref 3.4–10.8)

## 2023-11-01 PROCEDURE — 80048 BASIC METABOLIC PNL TOTAL CA: CPT

## 2023-11-01 PROCEDURE — 99283 EMERGENCY DEPT VISIT LOW MDM: CPT

## 2023-11-01 PROCEDURE — 51798 US URINE CAPACITY MEASURE: CPT

## 2023-11-01 PROCEDURE — 85025 COMPLETE CBC W/AUTO DIFF WBC: CPT

## 2023-11-01 PROCEDURE — 81001 URINALYSIS AUTO W/SCOPE: CPT

## 2023-11-01 PROCEDURE — P9612 CATHETERIZE FOR URINE SPEC: HCPCS

## 2023-11-01 RX ORDER — SODIUM CHLORIDE 0.9 % (FLUSH) 0.9 %
10 SYRINGE (ML) INJECTION AS NEEDED
Status: DISCONTINUED | OUTPATIENT
Start: 2023-11-01 | End: 2023-11-01 | Stop reason: HOSPADM

## 2023-11-01 NOTE — ED PROVIDER NOTES
Subjective   History of Present Illness  Patient is a pleasant 77-year-old obese  male with a history of BPH, hypertension and bladder stones who presents to the emergency room with complaints of acute urinary retention and suprapubic abdominal pain that started after having a bladder stone removed 2 days ago.  Patient states that his urination is only a dribble and he continues to have pelvic pressure, giving him the sensation that he needs to urinate.  He was discharged with tramadol and stool softeners but does not believe that the pain medication is helping.  He had some diarrhea but believes this is due to the stool softeners that he has been taking.  He has not had a normal urinary pattern since the surgery and states that the majority of his pain feels that it is in his urethra descending down the shaft of his penis.  He has not noted any gross blood or sediment in it.  He does report calling his urologist office and was offered to be seen tomorrow but patient did not believe he could wait until then and office advised emergency care.  He denies drug allergies.  He has not had use of alcohol, tobacco or drugs.    Uro: Heather  PCP: [facility]ia      Review of Systems   Constitutional:  Negative for appetite change and fever.   HENT:  Negative for congestion and rhinorrhea.    Respiratory:  Negative for shortness of breath.    Cardiovascular:  Negative for chest pain.   Gastrointestinal:  Positive for diarrhea. Negative for nausea and vomiting.   Genitourinary:  Positive for decreased urine volume, difficulty urinating and penile pain. Negative for flank pain.   Neurological:  Negative for headaches.   Psychiatric/Behavioral:  Negative for confusion. The patient is not nervous/anxious.    All other systems reviewed and are negative.      Past Medical History:   Diagnosis Date    Bladder stones     Hypertension     Sleep apnea     CPAP       No Known Allergies    Past Surgical History:   Procedure Laterality  Date    BACK SURGERY      lower back    CYSTOSCOPY W/ LASER LITHOTRIPSY      TOTAL SHOULDER REPLACEMENT Right        No family history on file.    Social History     Socioeconomic History    Marital status:    Tobacco Use    Smoking status: Never    Smokeless tobacco: Never   Vaping Use    Vaping Use: Never used   Substance and Sexual Activity    Alcohol use: Yes     Alcohol/week: 6.0 standard drinks of alcohol     Types: 6 Shots of liquor per week    Drug use: Not Currently    Sexual activity: Defer           Objective   Physical Exam  Vitals and nursing note reviewed.   Constitutional:       General: He is awake. He is not in acute distress.     Appearance: Normal appearance. He is well-developed. He is not ill-appearing.   HENT:      Head: Normocephalic and atraumatic. No raccoon eyes or Martin's sign.      Right Ear: Hearing, tympanic membrane and ear canal normal.      Left Ear: Hearing, tympanic membrane and ear canal normal.   Eyes:      General: Vision grossly intact. Gaze aligned appropriately.      Extraocular Movements: Extraocular movements intact.      Pupils: Pupils are equal, round, and reactive to light.   Cardiovascular:      Rate and Rhythm: Normal rate and regular rhythm.      Pulses: Normal pulses.      Heart sounds: Normal heart sounds. No murmur heard.  Pulmonary:      Effort: Pulmonary effort is normal. No respiratory distress.      Breath sounds: Normal breath sounds.   Abdominal:      General: Bowel sounds are normal.      Palpations: Abdomen is soft.      Tenderness: There is no abdominal tenderness.   Musculoskeletal:         General: No tenderness. Normal range of motion.      Cervical back: Normal range of motion and neck supple.   Skin:     General: Skin is warm and dry.      Capillary Refill: Capillary refill takes less than 2 seconds.   Neurological:      General: No focal deficit present.      Mental Status: He is alert and oriented to person, place, and time. Mental status is  "at baseline.      GCS: GCS eye subscore is 4. GCS verbal subscore is 5. GCS motor subscore is 6.      Cranial Nerves: Cranial nerves 2-12 are intact.      Sensory: Sensation is intact.      Motor: Motor function is intact.      Deep Tendon Reflexes: Reflexes are normal and symmetric.   Psychiatric:         Mood and Affect: Mood normal.         Behavior: Behavior normal.         Procedures           ED Course      /79   Pulse 71   Temp 97.8 °F (36.6 °C) (Oral)   Resp 18   Ht 177.8 cm (70\")   Wt 109 kg (240 lb 8.4 oz)   SpO2 98%   BMI 34.51 kg/m²   Labs Reviewed   URINALYSIS W/ MICROSCOPIC IF INDICATED (NO CULTURE) - Abnormal; Notable for the following components:       Result Value    Color, UA Orange (*)     Appearance, UA Cloudy (*)     Blood, UA Large (3+) (*)     Protein,  mg/dL (2+) (*)     Leuk Esterase, UA Moderate (2+) (*)     Nitrite, UA Positive (*)     All other components within normal limits   CBC WITH AUTO DIFFERENTIAL - Abnormal; Notable for the following components:    WBC 14.90 (*)     Lymphocyte % 18.8 (*)     Neutrophils, Absolute 10.60 (*)     Monocytes, Absolute 1.30 (*)     All other components within normal limits   URINALYSIS, MICROSCOPIC ONLY - Abnormal; Notable for the following components:    RBC, UA Too Numerous to Count (*)     WBC, UA 21-50 (*)     Bacteria, UA Trace (*)     All other components within normal limits   BASIC METABOLIC PANEL - Normal    Narrative:     GFR Normal >60  Chronic Kidney Disease <60  Kidney Failure <15    The GFR formula is only valid for adults with stable renal function between ages 18 and 70.   CBC AND DIFFERENTIAL    Narrative:     The following orders were created for panel order CBC & Differential.  Procedure                               Abnormality         Status                     ---------                               -----------         ------                     CBC Auto Differential[[id number]]        Abnormal            Final " "result                 Please view results for these tests on the individual orders.     Medications   sodium chloride 0.9 % flush 10 mL (has no administration in time range)     No radiology results for the last day                                       Medical Decision Making  Problems Addressed:  Acute urinary retention: complicated acute illness or injury  Dysuria: complicated acute illness or injury    Amount and/or Complexity of Data Reviewed  External Data Reviewed: notes.     Details: I reviewed the note written by urologist regarding patient's procedure.  Procedure went as planned with no immediate complications.  He was able to void prior to his discharge yesterday.  Labs: ordered. Decision-making details documented in ED Course.    Risk  Prescription drug management.    Patient is a pleasant 77-year-old obese  male with a history of hypertension, BPH and bladder stones who presents the emergency room with complaints of acute urinary retention and difficulty urinating after having bladder surgery 2 days ago.  On exam, abdomen was found to be soft and nontender with hypoactive bowel sounds throughout.  Normal S1/S2 without clicks or murmurs.  No JVD or leg swelling.  Lungs clear to auscultation in all fields.  No CVA tenderness.  No cervical lymphadenopathy.  Pupils PERRLA.  GCS 15.  Initial differentials include acute UTI, urinary obstruction, neurogenic bladder.this is not a complete list.      IV was established and labs were obtained.  Patient received above examination.   At time of examination, patient's blood pressure was 153/100 and therefore not requiring emergent antihypertensive treatment.  He was offered pain and nausea medication but has declined at this time, stating that he last had tramadol at 1515 with \"urinary pain meds\" at 1330.  Due to his elevated blood pressure, fluid bolus was not initially given.  Blood pressure was continuously monitored and improved without medication " intervention.  Patient was able to void on his own and provide urinary sample without catheterization.  Afterwards, bladder scan was completed and found to be 0 mils urinalysis reveals positive nitrites, leukocytes, bacteria concerning for acute UTI.  CBC significant for leukocytosis of 14.9 but otherwise unremarkable with normal kidney function.  Upon chart review, it was noted that patient has already been prescribed Macrobid, Pyridium, tramadol and docusate.  Upon reassessment, patient reports improvement and states that he is ready to go home.  At this time, I do not see reason to admit patient but he was advised to follow-up to his primary care provider and urologist.  Patient was advised to return if he begins retaining urine or develops a fever.  He was advised to continue taking his medications and finish his antibiotics.  He verbalized understanding and is agreeable to plan of care.  Patient was able to ambulate upright steadily without assistance upon discharge.    I discussed the findings with patient who voices understanding of discharge instructions, signs and symptoms requiring return to the ED; discharged improved and stable condition with follow-up for reevaluation.    Patient is aware that discharge does not mean that nothing is wrong but it indicates no emergency is present and they must continue care with follow-up as given below or physician of their choice.    This document is intended for medical expert use only.  Reading of this document by patients and/or patient's family without participating medical staff guidance may result in misinterpretation and unintended morbidity.  Any interpretation of such data is the responsibility of the patient and/or family member responsible for the patient in concert with their primary or specialist providers, not to be left for sources of online search as such as [employer], [employer] or similar queries.  Relying on these approaches to knowledge may result in  misinterpretation, misguided goals of care and even death should patient or family members try recommendations outside of the realm of professional medical care in a supervised inpatient environment.    This medical document was created using Dragon dictation system. Some errors in speech recognition may occur.    Final diagnoses:   Acute urinary retention   Dysuria       ED Disposition  ED Disposition       ED Disposition   Discharge    Condition   Stable    Comment   --               Deon Palacio MD  [address] [place] [address] [phone]          Ryan Romero MD  [address] [place] [address] [place] [address] [phone]               Medication List      No changes were made to your prescriptions during this visit.            Noreen Trinidad, APRN  11/01/23 2025

## 2023-11-02 NOTE — DISCHARGE INSTRUCTIONS
Continue taking previously prescribed medication as directed.  Be sure to finish your entire course of antibiotics.  Rest.  Get plenty of fluids and avoid dehydrating compounds.    Follow-up with urologist for further evaluation of urinary retention.  Follow-up with primary care provider as needed.    Return to the ER for new or worsening symptoms.

## 2024-03-18 NOTE — PROGRESS NOTES
"Chief Complaint  Memory Loss    Subjective            Mango Ling presents to [facility] NEUROLOGY for MEMORY  History of Present Illness  New patient referred by Dr. Palacio for memory loss    Started 3 years ago    He states he can't recall some conversations.     Father had alzheimer's in his 70's father has passed away.   Late onset     Forgets where bathroom is in house for two years  Drives car , needs directions to the grocery  store      Maximum   Score Patient's   Score Questions   5 1 \"What is the year?Season?Date?Day of the week?Month?\"   5 1 \"Where are we now: State?County?Town/city?Hospital?Floor?\"   3 3 3 Unrelated objects Number of trials:___   5 5 Count backward from 100 by sevens or spell WORLD backwards   3 0 Name 3 things from above   2 2 Identify 2 objects   1 1 Repeat the phrase: No ifs, ands,or buts.   3 3 Take paper in right hand, fold it in half, and put it on the floor.   1 1 Please read this and do what it says. \"Close your eyes\"   1 1 Make up and write a sentence about anything. Noun and verb   1 1 Copy this picture 10 angles must be present.   30 19 Total MMSE       Family History   Problem Relation Age of Onset    Alzheimer's disease Father        Past Medical History:   Diagnosis Date    Bladder stones     Hypertension     Sleep apnea     CPAP       Social History     Socioeconomic History    Marital status:    Tobacco Use    Smoking status: Never    Smokeless tobacco: Never   Vaping Use    Vaping status: Never Used   Substance and Sexual Activity    Alcohol use: Yes     Alcohol/week: 6.0 standard drinks of alcohol     Types: 6 Shots of liquor per week    Drug use: Not Currently    Sexual activity: Defer         Current Outpatient Medications:     losartan (COZAAR) 50 MG tablet, Take 2 tablets by mouth Daily., Disp: , Rfl:     timolol (TIMOPTIC) 0.5 % ophthalmic solution, , Disp: , Rfl:     triamterene-hydrochlorothiazide (MAXZIDE-25) 37.5-25 MG per tablet, " , Disp: , Rfl:     docusate sodium (Colace) 100 MG capsule, Take 1 capsule by mouth 2 (Two) Times a Day As Needed (post op and while taking narcotics). (Patient not taking: Reported on 3/19/2024), Disp: 30 capsule, Rfl: 1    [START ON 4/19/2024] donepezil (Aricept) 10 MG tablet, Take one per day for one month then one bid, Disp: 180 tablet, Rfl: 3    donepezil (Aricept) 5 MG tablet, Take 1 tablet by mouth Every Night for 30 days., Disp: 30 tablet, Rfl: 0    finasteride (PROSCAR) 5 MG tablet, Take 1 tablet by mouth Daily. (Patient not taking: Reported on 3/19/2024), Disp: , Rfl:     hydroCHLOROthiazide 25 MG tablet, , Disp: , Rfl:     ipratropium (ATROVENT) 0.03 % nasal spray, , Disp: , Rfl:     latanoprost (XALATAN) 0.005 % ophthalmic solution, , Disp: , Rfl:     nitrofurantoin, macrocrystal-monohydrate, (Macrobid) 100 MG capsule, Take 1 capsule by mouth 2 (Two) Times a Day. (Patient not taking: Reported on 3/19/2024), Disp: 14 capsule, Rfl: 0    phenazopyridine (Pyridium) 100 MG tablet, Take 1 tablet by mouth 3 (Three) Times a Day As Needed (dysuria). (Patient not taking: Reported on 3/19/2024), Disp: 15 tablet, Rfl: 0    traMADol (ULTRAM) 50 MG tablet, Take 1 tablet by mouth Every 6 (Six) Hours As Needed for Moderate Pain. (Patient not taking: Reported on 3/19/2024), Disp: 20 tablet, Rfl: 0    vitamin B-12 (CYANOCOBALAMIN) 1000 MCG tablet, Take 1 tablet by mouth Daily. (Patient not taking: Reported on 3/19/2024), Disp: , Rfl:     Review of Systems   Constitutional:  Positive for activity change and fatigue.   HENT:  Positive for dental problem, hearing loss and trouble swallowing.    Respiratory:  Positive for choking.    Musculoskeletal:  Positive for back pain and gait problem.   Psychiatric/Behavioral:  Positive for confusion.    All other systems reviewed and are negative.           Objective   Vital Signs:   /84 (BP Location: Left arm, Patient Position: Sitting, Cuff Size: Adult)   Pulse 99   Ht 177.8  "cm (70\")   Wt 104 kg (230 lb)   BMI 33.00 kg/m²     Physical Exam  Vitals reviewed.   Constitutional:       Appearance: Normal appearance.   HENT:      Nose: Nose normal.   Eyes:      Pupils: Pupils are equal, round, and reactive to light.   Cardiovascular:      Rate and Rhythm: Normal rate.      Pulses: Normal pulses.   Pulmonary:      Effort: Pulmonary effort is normal. No respiratory distress.   Musculoskeletal:      Cervical back: Normal range of motion.   Neurological:      General: No focal deficit present.      Mental Status: He is alert and oriented to person, place, and time.   Psychiatric:         Mood and Affect: Mood normal.        Result Review :                Neurologic Exam     Mental Status   Oriented to person, place, and time.     Cranial Nerves     CN III, IV, VI   Pupils are equal, round, and reactive to light.             Assessment and Plan    Diagnoses and all orders for this visit:    1. Memory loss (Primary)  -     donepezil (Aricept) 5 MG tablet; Take 1 tablet by mouth Every Night for 30 days.  Dispense: 30 tablet; Refill: 0  -     donepezil (Aricept) 10 MG tablet; Take one per day for one month then one bid  Dispense: 180 tablet; Refill: 3  -     Vitamin B12; Future  -     Folate; Future  -     Calcitriol (1,25 di-OH Vitamin D); Future  -     TSH; Future  -     T Pallidum Antibody w/ reflex RPR (Syphilis); Future  -     Vitamin B6; Future  -     Vitamin E; Future  -     Copper, Serum; Future  -     Comprehensive Metabolic Panel; Future  -     CBC & Differential; Future  -     C-reactive Protein; Future  -     MRI Brain With & Without Contrast; Future      Will obtain mri brain  Check labs  Start aricept         Follow Up   Return in about 6 months (around 9/19/2024).  Patient was given instructions and counseling regarding his condition or for health maintenance advice. Please see specific information pulled into the AVS if appropriate.         This document has been electronically " signed by Joseph Seipel, MD on March 19, 2024 14:49 EDT

## 2024-03-19 ENCOUNTER — HOME HEALTH ADMISSION (OUTPATIENT)
Dept: HOME HEALTH SERVICES | Facility: HOME HEALTHCARE | Age: 79
End: 2024-03-19
Payer: MEDICARE

## 2024-03-19 ENCOUNTER — TRANSCRIBE ORDERS (OUTPATIENT)
Dept: HOME HEALTH SERVICES | Facility: HOME HEALTHCARE | Age: 79
End: 2024-03-19
Payer: MEDICARE

## 2024-03-19 ENCOUNTER — OFFICE VISIT (OUTPATIENT)
Dept: NEUROLOGY | Facility: CLINIC | Age: 79
End: 2024-03-19
Payer: MEDICARE

## 2024-03-19 VITALS
BODY MASS INDEX: 32.93 KG/M2 | DIASTOLIC BLOOD PRESSURE: 84 MMHG | HEIGHT: 70 IN | WEIGHT: 230 LBS | HEART RATE: 99 BPM | SYSTOLIC BLOOD PRESSURE: 126 MMHG

## 2024-03-19 DIAGNOSIS — R41.3 MEMORY LOSS: Primary | ICD-10-CM

## 2024-03-19 DIAGNOSIS — G31.84 MILD COGNITIVE IMPAIRMENT: ICD-10-CM

## 2024-03-19 DIAGNOSIS — M62.81 MUSCLE WEAKNESS: Primary | ICD-10-CM

## 2024-03-19 PROBLEM — M19.011 OSTEOARTHRITIS OF RIGHT GLENOHUMERAL JOINT: Status: ACTIVE | Noted: 2023-02-10

## 2024-03-19 PROCEDURE — 1159F MED LIST DOCD IN RCRD: CPT | Performed by: PSYCHIATRY & NEUROLOGY

## 2024-03-19 PROCEDURE — 99204 OFFICE O/P NEW MOD 45 MIN: CPT | Performed by: PSYCHIATRY & NEUROLOGY

## 2024-03-19 PROCEDURE — 1160F RVW MEDS BY RX/DR IN RCRD: CPT | Performed by: PSYCHIATRY & NEUROLOGY

## 2024-03-19 RX ORDER — LATANOPROST 50 UG/ML
SOLUTION/ DROPS OPHTHALMIC
COMMUNITY
Start: 2024-02-20

## 2024-03-19 RX ORDER — TIMOLOL MALEATE 5 MG/ML
SOLUTION/ DROPS OPHTHALMIC
COMMUNITY
Start: 2024-02-26

## 2024-03-19 RX ORDER — DONEPEZIL HYDROCHLORIDE 10 MG/1
TABLET, FILM COATED ORAL
Qty: 180 TABLET | Refills: 3 | Status: SHIPPED | OUTPATIENT
Start: 2024-04-19

## 2024-03-19 RX ORDER — TRIAMTERENE AND HYDROCHLOROTHIAZIDE 37.5; 25 MG/1; MG/1
TABLET ORAL
COMMUNITY
Start: 2024-02-28

## 2024-03-19 RX ORDER — IPRATROPIUM BROMIDE 21 UG/1
SPRAY, METERED NASAL
COMMUNITY
Start: 2024-03-04

## 2024-03-19 RX ORDER — HYDROCHLOROTHIAZIDE 25 MG/1
TABLET ORAL
COMMUNITY
Start: 2024-01-06

## 2024-03-19 RX ORDER — DONEPEZIL HYDROCHLORIDE 5 MG/1
5 TABLET, FILM COATED ORAL NIGHTLY
Qty: 30 TABLET | Refills: 0 | Status: SHIPPED | OUTPATIENT
Start: 2024-03-19 | End: 2024-04-18

## 2024-03-22 ENCOUNTER — PATIENT ROUNDING (BHMG ONLY) (OUTPATIENT)
Dept: NEUROLOGY | Facility: CLINIC | Age: 79
End: 2024-03-22
Payer: MEDICARE

## 2024-04-03 ENCOUNTER — TELEPHONE (OUTPATIENT)
Dept: NEUROLOGY | Facility: CLINIC | Age: 79
End: 2024-04-03
Payer: MEDICARE

## 2024-04-03 NOTE — TELEPHONE ENCOUNTER
----- Message from Joseph F Seipel, MD sent at 4/3/2024  9:06 AM EDT -----  MRI brain no tumors, strokes or hydrocephalus,  there  are age related changes of atrophy, and microvascular white matter changes,   The microvascular deterioration that tends to come on with age and hx of blood pressure can contribute to memory problems

## 2024-04-24 DIAGNOSIS — R41.3 MEMORY LOSS: ICD-10-CM

## 2024-04-24 RX ORDER — DONEPEZIL HYDROCHLORIDE 5 MG/1
5 TABLET, FILM COATED ORAL NIGHTLY
Qty: 30 TABLET | Refills: 0 | Status: SHIPPED | OUTPATIENT
Start: 2024-04-24 | End: 2024-05-24

## 2024-05-03 ENCOUNTER — LAB (OUTPATIENT)
Dept: LAB | Facility: HOSPITAL | Age: 79
End: 2024-05-03
Payer: MEDICARE

## 2024-05-03 DIAGNOSIS — R41.3 MEMORY LOSS: ICD-10-CM

## 2024-05-03 LAB
ALBUMIN SERPL-MCNC: 4.2 G/DL (ref 3.5–5.2)
ALBUMIN/GLOB SERPL: 1.5 G/DL
ALP SERPL-CCNC: 73 U/L (ref 39–117)
ALT SERPL W P-5'-P-CCNC: 32 U/L (ref 1–41)
ANION GAP SERPL CALCULATED.3IONS-SCNC: 9 MMOL/L (ref 5–15)
AST SERPL-CCNC: 25 U/L (ref 1–40)
BASOPHILS # BLD AUTO: 0.06 10*3/MM3 (ref 0–0.2)
BASOPHILS NFR BLD AUTO: 0.7 % (ref 0–1.5)
BILIRUB SERPL-MCNC: 0.8 MG/DL (ref 0–1.2)
BUN SERPL-MCNC: 18 MG/DL (ref 8–23)
BUN/CREAT SERPL: 12.7 (ref 7–25)
CALCIUM SPEC-SCNC: 9.5 MG/DL (ref 8.6–10.5)
CHLORIDE SERPL-SCNC: 104 MMOL/L (ref 98–107)
CO2 SERPL-SCNC: 27 MMOL/L (ref 22–29)
CREAT SERPL-MCNC: 1.42 MG/DL (ref 0.76–1.27)
CRP SERPL-MCNC: 0.96 MG/DL (ref 0–0.5)
DEPRECATED RDW RBC AUTO: 43.6 FL (ref 37–54)
EGFRCR SERPLBLD CKD-EPI 2021: 50.6 ML/MIN/1.73
EOSINOPHIL # BLD AUTO: 0.11 10*3/MM3 (ref 0–0.4)
EOSINOPHIL NFR BLD AUTO: 1.4 % (ref 0.3–6.2)
ERYTHROCYTE [DISTWIDTH] IN BLOOD BY AUTOMATED COUNT: 13 % (ref 12.3–15.4)
FOLATE SERPL-MCNC: 12.6 NG/ML (ref 4.78–24.2)
GLOBULIN UR ELPH-MCNC: 2.8 GM/DL
GLUCOSE SERPL-MCNC: 99 MG/DL (ref 65–99)
HCT VFR BLD AUTO: 40.6 % (ref 37.5–51)
HGB BLD-MCNC: 13.8 G/DL (ref 13–17.7)
IMM GRANULOCYTES # BLD AUTO: 0.03 10*3/MM3 (ref 0–0.05)
IMM GRANULOCYTES NFR BLD AUTO: 0.4 % (ref 0–0.5)
LYMPHOCYTES # BLD AUTO: 2.51 10*3/MM3 (ref 0.7–3.1)
LYMPHOCYTES NFR BLD AUTO: 31.1 % (ref 19.6–45.3)
MCH RBC QN AUTO: 31.2 PG (ref 26.6–33)
MCHC RBC AUTO-ENTMCNC: 34 G/DL (ref 31.5–35.7)
MCV RBC AUTO: 91.9 FL (ref 79–97)
MONOCYTES # BLD AUTO: 0.62 10*3/MM3 (ref 0.1–0.9)
MONOCYTES NFR BLD AUTO: 7.7 % (ref 5–12)
NEUTROPHILS NFR BLD AUTO: 4.73 10*3/MM3 (ref 1.7–7)
NEUTROPHILS NFR BLD AUTO: 58.7 % (ref 42.7–76)
NRBC BLD AUTO-RTO: 0 /100 WBC (ref 0–0.2)
PLATELET # BLD AUTO: 320 10*3/MM3 (ref 140–450)
PMV BLD AUTO: 8.9 FL (ref 6–12)
POTASSIUM SERPL-SCNC: 4 MMOL/L (ref 3.5–5.2)
PROT SERPL-MCNC: 7 G/DL (ref 6–8.5)
RBC # BLD AUTO: 4.42 10*6/MM3 (ref 4.14–5.8)
SODIUM SERPL-SCNC: 140 MMOL/L (ref 136–145)
T PALLIDUM IGG SER QL: NORMAL
TSH SERPL DL<=0.05 MIU/L-ACNC: 1.29 UIU/ML (ref 0.27–4.2)
VIT B12 BLD-MCNC: 400 PG/ML (ref 211–946)
WBC NRBC COR # BLD AUTO: 8.06 10*3/MM3 (ref 3.4–10.8)

## 2024-05-03 PROCEDURE — 84446 ASSAY OF VITAMIN E: CPT

## 2024-05-03 PROCEDURE — 80053 COMPREHEN METABOLIC PANEL: CPT

## 2024-05-03 PROCEDURE — 86140 C-REACTIVE PROTEIN: CPT

## 2024-05-03 PROCEDURE — 85025 COMPLETE CBC W/AUTO DIFF WBC: CPT

## 2024-05-03 PROCEDURE — 86780 TREPONEMA PALLIDUM: CPT

## 2024-05-03 PROCEDURE — 82652 VIT D 1 25-DIHYDROXY: CPT

## 2024-05-03 PROCEDURE — 82525 ASSAY OF COPPER: CPT

## 2024-05-03 PROCEDURE — 82607 VITAMIN B-12: CPT

## 2024-05-03 PROCEDURE — 82746 ASSAY OF FOLIC ACID SERUM: CPT

## 2024-05-03 PROCEDURE — 36415 COLL VENOUS BLD VENIPUNCTURE: CPT

## 2024-05-03 PROCEDURE — 84443 ASSAY THYROID STIM HORMONE: CPT

## 2024-05-03 PROCEDURE — 84207 ASSAY OF VITAMIN B-6: CPT

## 2024-05-06 LAB — 1,25(OH)2D SERPL-MCNC: 28 PG/ML (ref 24.8–81.5)

## 2024-05-07 LAB — COPPER SERPL-MCNC: 111 UG/DL (ref 69–132)

## 2024-05-08 LAB — PYRIDOXAL PHOS SERPL-MCNC: 12 UG/L (ref 3.4–65.2)

## 2024-05-09 LAB
A-TOCOPHEROL VIT E SERPL-MCNC: 10.7 MG/L (ref 9–29)
GAMMA TOCOPHEROL SERPL-MCNC: 2.2 MG/L (ref 0.5–4.9)

## 2024-05-21 ENCOUNTER — TELEPHONE (OUTPATIENT)
Dept: NEUROLOGY | Facility: CLINIC | Age: 79
End: 2024-05-21
Payer: MEDICARE

## 2024-05-21 DIAGNOSIS — R41.3 MEMORY LOSS: ICD-10-CM

## 2024-05-21 RX ORDER — DONEPEZIL HYDROCHLORIDE 5 MG/1
5 TABLET, FILM COATED ORAL 2 TIMES DAILY
Qty: 60 TABLET | Refills: 3 | Status: SHIPPED | OUTPATIENT
Start: 2024-05-21

## 2024-05-21 NOTE — TELEPHONE ENCOUNTER
Go back to 5 mg daily and if doing well can try taking 5 mg in the morning 5 mg at night though the insurance company may not want to pay for this dose twice a day

## 2024-05-21 NOTE — TELEPHONE ENCOUNTER
"    Caller: Vesna Ling    Relationship: Emergency Contact    Best call back number:   Telephone Information:   Mobile [phone]   Mobile [phone]         Which medication are you concerned about: donepezil (Aricept) 10 MG tablet     What are your concerns: SHE STATES TODAY WAS THE PT'S FIRST TIME TAKING THE 10 MG TABLET, SHE STATES A FEW HOURS LATER PT WAS HAVING \"EXTREME NAUSEA, COUGHING, GAGING, HAVING THE SWEATS, BLOATED\"      ALSO SHE STATES, SHE IS WONDERING IF THE PATIENT CAN JUST TAKE 5 MG IN THE MORNING AND 5 MG AT NIGHT INSTEAD.   "

## 2024-05-21 NOTE — TELEPHONE ENCOUNTER
PT SPOUSE INFORMED AND VOICED UNDERSTANDING ABOUT INSTRUCTIONS AND INSURANCE MAY NOT WANTING TO COVER 5 MG BID

## 2024-10-17 DIAGNOSIS — R41.3 MEMORY LOSS: ICD-10-CM

## 2024-10-17 RX ORDER — DONEPEZIL HYDROCHLORIDE 5 MG/1
5 TABLET, FILM COATED ORAL 2 TIMES DAILY
Qty: 60 TABLET | Refills: 2 | Status: SHIPPED | OUTPATIENT
Start: 2024-10-17

## 2024-12-09 NOTE — PROGRESS NOTES
"Chief Complaint  Follow-up (MEMORY)    Subjective          Mango Ling presents to [facility] NEUROLOGY for memory loss  History of Present Illness  Follow up on memory he currently takes aricept 5 mg 1 bid  Gradual onset memory impairment over past 5 years     Drives some , needs help with directions    Maximum   Score Patient's   Score Questions   5 4 \"What is the year?Season?Date?Day of the week?Month?\"   5 1 \"Where are we now: State?County?Town/city?Hospital?Floor?\"   3   3 3 Unrelated objects Number of trials:___   5 5 Count backward from 100 by sevens or spell WORLD backwards   3 0 Name 3 things from above   2 2 Identify 2 objects   1 0 Repeat the phrase: No ifs, ands,or buts.   3 2 Take paper in right hand, fold it in half, and put it on the floor.   1 1 Please read this and do what it says. \"Close your eyes\"   1 1 Make up and write a sentence about anything. Noun and verb   1 0 Copy this picture 10 angles must be present.   30 19 Total MMSE                  ====================================================  Previous Office Visit  3-  New patient referred by Dr. Palacio for memory loss     Started 3 years ago     He states he can't recall some conversations.      Father had alzheimer's in his 70's father has passed away.   Late onset      Forgets where bathroom is in house for two years.  Drives car , needs directions to the grocery  store        Maximum   Score Patient's   Score Questions   5 1 \"What is the year?Season?Date?Day of the week?Month?\"   5 1 \"Where are we now: State?County?Town/city?Hospital?Floor?\"   3 3 3 Unrelated objects Number of trials:___   5 5 Count backward from 100 by sevens or spell WORLD backwards   3 0 Name 3 things from above   2 2 Identify 2 objects   1 1 Repeat the phrase: No ifs, ands,or buts.   3 3 Take paper in right hand, fold it in half, and put it on the floor.   1 1 Please read this and do what it says. \"Close your eyes\"   1 1 Make up and " write a sentence about anything. Noun and verb   1 1 Copy this picture 10 angles must be present.   30 19 Total MMSE   ==============================================================     Current Outpatient Medications:     fluticasone (FLONASE) 50 MCG/ACT nasal spray, , Disp: , Rfl:     Gemtesa 75 MG tablet, , Disp: , Rfl:     losartan (COZAAR) 100 MG tablet, , Disp: , Rfl:     pantoprazole (PROTONIX) 40 MG EC tablet, , Disp: , Rfl:     docusate sodium (Colace) 100 MG capsule, Take 1 capsule by mouth 2 (Two) Times a Day As Needed (post op and while taking narcotics). (Patient not taking: Reported on 12/10/2024), Disp: 30 capsule, Rfl: 1    donepezil (ARICEPT) 5 MG tablet, TAKE 1 TABLET BY MOUTH TWICE DAILY, Disp: 60 tablet, Rfl: 2    finasteride (PROSCAR) 5 MG tablet, Take 1 tablet by mouth Daily. (Patient not taking: Reported on 12/10/2024), Disp: , Rfl:     hydroCHLOROthiazide 25 MG tablet, , Disp: , Rfl:     ipratropium (ATROVENT) 0.03 % nasal spray, , Disp: , Rfl:     latanoprost (XALATAN) 0.005 % ophthalmic solution, , Disp: , Rfl:     memantine (NAMENDA) 5 MG tablet, One tab daily for one month then one tab bid, Disp: 60 tablet, Rfl: 5    nitrofurantoin, macrocrystal-monohydrate, (Macrobid) 100 MG capsule, Take 1 capsule by mouth 2 (Two) Times a Day. (Patient not taking: Reported on 12/10/2024), Disp: 14 capsule, Rfl: 0    phenazopyridine (Pyridium) 100 MG tablet, Take 1 tablet by mouth 3 (Three) Times a Day As Needed (dysuria). (Patient not taking: Reported on 12/10/2024), Disp: 15 tablet, Rfl: 0    timolol (TIMOPTIC) 0.5 % ophthalmic solution, , Disp: , Rfl:     traMADol (ULTRAM) 50 MG tablet, Take 1 tablet by mouth Every 6 (Six) Hours As Needed for Moderate Pain. (Patient not taking: Reported on 12/10/2024), Disp: 20 tablet, Rfl: 0    triamterene-hydrochlorothiazide (MAXZIDE-25) 37.5-25 MG per tablet, , Disp: , Rfl:     vitamin B-12 (CYANOCOBALAMIN) 1000 MCG tablet, Take 1 tablet by mouth Daily. (Patient not  "taking: Reported on 12/10/2024), Disp: , Rfl:     Review of Systems   Constitutional:  Negative for fatigue.   Respiratory:  Negative for apnea and shortness of breath.    Psychiatric/Behavioral:  Negative for sleep disturbance.           Objective:    Vital Signs:   /84   Pulse 62   Ht 177.8 cm (70\")   BMI 33.00 kg/m²     Physical Exam  Vitals reviewed.   Constitutional:       Appearance: Normal appearance.   Pulmonary:      Effort: Pulmonary effort is normal. No respiratory distress.   Neurological:      Mental Status: He is alert.      Gait: Gait abnormal.      Comments: Wide based gait, slow gait        Result Review :                Neurological Exam  Mental Status  Alert.    Gait   Abnormal gait.        Assessment and Plan    Diagnoses and all orders for this visit:    1. Memory loss (Primary)  -     memantine (NAMENDA) 5 MG tablet; One tab daily for one month then one tab bid  Dispense: 60 tablet; Refill: 5    2. Obstructive sleep apnea syndrome  Overview:  CPAP      3. Gait abnormality  -     Ambulatory Referral to Physical Therapy for Evaluation & Treatment     Continue aricept, start namenda  For gait abnormality will send to PT  Pt encouraged to continue cpap   Pt to return in 6 months for continued longitudinal care of chronic memory and gait problems    Follow Up   Return in about 6 months (around 6/10/2025).  Patient was given instructions and counseling regarding his condition or for health maintenance advice. Please see specific information pulled into the AVS if appropriate.     This document has been electronically signed by Joseph Seipel, MD on December 10, 2024 15:39 EST      "

## 2024-12-10 ENCOUNTER — OFFICE VISIT (OUTPATIENT)
Dept: NEUROLOGY | Facility: CLINIC | Age: 79
End: 2024-12-10
Payer: MEDICARE

## 2024-12-10 VITALS
DIASTOLIC BLOOD PRESSURE: 84 MMHG | BODY MASS INDEX: 33 KG/M2 | HEIGHT: 70 IN | SYSTOLIC BLOOD PRESSURE: 136 MMHG | HEART RATE: 62 BPM

## 2024-12-10 DIAGNOSIS — G47.33 OBSTRUCTIVE SLEEP APNEA SYNDROME: ICD-10-CM

## 2024-12-10 DIAGNOSIS — R41.3 MEMORY LOSS: Primary | ICD-10-CM

## 2024-12-10 DIAGNOSIS — R26.9 GAIT ABNORMALITY: ICD-10-CM

## 2024-12-10 PROCEDURE — 1160F RVW MEDS BY RX/DR IN RCRD: CPT | Performed by: PSYCHIATRY & NEUROLOGY

## 2024-12-10 PROCEDURE — 99214 OFFICE O/P EST MOD 30 MIN: CPT | Performed by: PSYCHIATRY & NEUROLOGY

## 2024-12-10 PROCEDURE — 1159F MED LIST DOCD IN RCRD: CPT | Performed by: PSYCHIATRY & NEUROLOGY

## 2024-12-10 RX ORDER — FLUTICASONE PROPIONATE 50 MCG
SPRAY, SUSPENSION (ML) NASAL
COMMUNITY
Start: 2024-11-25

## 2024-12-10 RX ORDER — LOSARTAN POTASSIUM 100 MG/1
TABLET ORAL
COMMUNITY
Start: 2024-09-26

## 2024-12-10 RX ORDER — PANTOPRAZOLE SODIUM 40 MG/1
TABLET, DELAYED RELEASE ORAL
COMMUNITY
Start: 2024-10-14

## 2024-12-10 RX ORDER — MEMANTINE HYDROCHLORIDE 5 MG/1
TABLET ORAL
Qty: 60 TABLET | Refills: 5 | Status: SHIPPED | OUTPATIENT
Start: 2024-12-10

## 2024-12-10 RX ORDER — VIBEGRON 75 MG/1
TABLET, FILM COATED ORAL
COMMUNITY
Start: 2024-09-24

## 2025-01-16 ENCOUNTER — TREATMENT (OUTPATIENT)
Dept: PHYSICAL THERAPY | Facility: CLINIC | Age: 80
End: 2025-01-16
Payer: MEDICARE

## 2025-01-16 DIAGNOSIS — R26.9 GAIT ABNORMALITY: Primary | ICD-10-CM

## 2025-01-16 NOTE — PROGRESS NOTES
Physical Therapy Initial Evaluation and Plan of Care      Patient: Mango Ling   : 1945  Diagnosis/ICD-10 Code:  Gait abnormality [R26.9]  Referring practitioner: Joseph F Seipel, MD  Date of Initial Visit: 2025  Mango Ling was seen by Anjum Harris PT at [facility]  [facility]AP[facility]   [facility] [place] [address]  Fax [phone]  Phone [phone]   Today's Date: 2025  Patient seen for 1 sessions           Subjective Questionnaire: ABC: 69%; LEON; DGI  (fall risk)      Subjective Evaluation    History of Present Illness  Mechanism of injury: 80 y/o male referred to PT due to balance and gait abnormality from Dr. Seipel: wife states patient has had 3 falls in last month without injury. Diagnosed with Alzheimer's disease per wife. Patient voiced no specific memory of falls. Leads sedentary lifestyle - primarily sitting. States he needs motivation and help due to falls and gait problems.  Has walker in home and uses at times, especially in morning.  Primary issue imbalance and falls      PMH and pertinent information reviewed in Epic.             Patient Occupation: retired Social Support  Lives in: one-story house (2 steps w/o rail)  Lives with: spouse    Hand dominance: right    Patient Goals  Patient goals for therapy: improved balance  Patient goal: I need more physical exercise; sits in chair most of time; reduce risk of fall.         Objective          Ambulation   Weight-Bearing Status   Weight-Bearing Status (Left): full weight bearing   Weight-Bearing Status (Right): full weight-bearing    Assistive device used: single point cane    Additional Weight-Bearing Status Details  Uses cane for community ambulation.     Ambulation: Level Surfaces   Ambulation with assistive device: independent    Additional Level Surfaces Ambulation Details  Slow and guarded gait: flexed at trunk and hips.    Observational Gait   Gait: asymmetric   Decreased  walking speed, stride length, left swing time, right swing time, left step length and right step length.   Base of support: increased    Comments   Strength: B LE's 4/5, knee extension and flexion 4-/5; ankles 4/5 except PF 3+/5    Sensation: diminished to light touch R foot.    AROM: grossly Ue's and LE's WFL's: hip flexor tightness moderate B from flexed posture.    Administered both gait and balance measures:  LEON  DGI:  (fall risk)      Access Code: [email or web address]  URL: [email or web address]  Date: 2025  Prepared by: Anjum Harris    Exercises  - Seated March  - 2 x daily - 7 x weekly - 2 sets - 10 reps  - seated heel/toe riases  - 2 x daily - 7 x weekly - 2 sets - 10 reps  - Seated Long Arc Quad  - 2 x daily - 7 x weekly - 2 sets - 10 reps  - Sit to Stand with Counter Support  - 2 x daily - 7 x weekly - 1 sets - 10 reps  Scored LEON and DGI: see objective    Assessment & Plan       Assessment  Impairments: abnormal coordination, abnormal gait, activity intolerance, impaired balance, impaired physical strength, lacks appropriate home exercise program and safety issue   Functional limitations: walking, standing, stooping and reaching overhead   Assessment details: Pt presents to PT with symptoms consistent with imbalance and gait abnormalities; impaired posture and weakness in LE's that contribute to impairments.     Impairments place patient in high risk of falls and affect functional mobility and community ambulation. Additionally, patient has been diagnosed with Alzheimer's disease: he is able to follow commands and participate in therapy.  Pt would benefit from skilled PT intervention to address the deficits noted.     Prognosis: good    Goals  Plan Goals: SHORT TERM GOALS:   5 weeks  1. Pt will be compliant with HEP.  2. Pt will have DGI score of 15/ in order to decrease risk for falls.  3.  Pt to perform 10 x STS in 20 sec to demonstrate improve safety with positional changes.  4. Pt  will be able to ambulate for 200 ft without rest break due to improved endurance and strength.  5. Pt will demonstrate 4-/5 B LE strength in order to improve gait, transfers and stair climbing ability.      LONG TERM GOALS:  10 weeks  1.Pt will be able to ambulate for 500 ft or greater with improve step length and heel strike in order to decrease fall risk.  2.  Pt to score at least 40 on LEON assessment.  3. Pt will be able to perform 20 sit-stand without use of UE due to improved strength.  4. Pt will be able to stand on foam for 30 sec or greater with eyes open due to improved balance.  5. Pt will improve  score 21/24 on DGI to improve gait mechanics, strength and balance.          Plan  Therapy options: will be seen for skilled therapy services  Planned modality interventions: cryotherapy and thermotherapy (hydrocollator packs)  Planned therapy interventions: abdominal trunk stabilization, ADL retraining, body mechanics training, balance/weight-bearing training, flexibility, functional ROM exercises, gait training, home exercise program, neuromuscular re-education, motor coordination training, postural training, soft tissue mobilization, strengthening, stretching, therapeutic activities, transfer training and IADL retraining  Frequency: 2x week  Duration in visits: 12  Treatment plan discussed with: patient        History # of Personal Factors and/or Comorbidities: MODERATE (1-2)  Examination of Body System(s): # of elements: MODERATE (3)  Clinical Presentation: EVOLVING  Clinical Decision Making: MODERATE      Timed:         Manual Therapy:         mins  52033;     Therapeutic Exercise:    15     mins  23403;     Neuromuscular Elizabeth:   10     mins  95433;    Therapeutic Activity:     5    mins  30228;     Gait Training:           mins  82058;     Ultrasound:          mins  51733;    Ionto                                   mins   43623  Self Care                            mins   65448  Aquatic                               mins 83172      Un-Timed:  Canalith Repositioning __ mins 06682  Electrical Stimulation:         mins  57658 ( );  Dry Needling          mins self-pay  Traction          mins 91800  Low Eval          Mins  97465  Mod Eval      30   Mins  61921  High Eval                            Mins  13963  Re-Eval                               mins  14734        Timed Treatment:     30 mins   Total Treatment:     60   mins    PT SIGNATURE: Anjum Harris PT, DPT  [place] License#: [id number]       Electronically signed by Anjum Harris PT, 01/16/25, 2:07 PM EST      Medicare Initial Certification  Certification Period:  1/16/2025 thru 4/15/2025  I certify that the therapy services are furnished while this patient is under my care.  The services outlined above are required by this patient, and will be reviewed every 90 days.       Physician Signature:__________________________________________________    PHYSICIAN: Seipel, Joseph F, MD      DATE:     Please sign and return via fax to [phone].. Thank you, [facility] Physical Therapy.

## 2025-01-20 DIAGNOSIS — R41.3 MEMORY LOSS: ICD-10-CM

## 2025-01-20 RX ORDER — DONEPEZIL HYDROCHLORIDE 5 MG/1
5 TABLET, FILM COATED ORAL 2 TIMES DAILY
Qty: 60 TABLET | Refills: 1 | Status: SHIPPED | OUTPATIENT
Start: 2025-01-20

## 2025-01-22 ENCOUNTER — TREATMENT (OUTPATIENT)
Dept: PHYSICAL THERAPY | Facility: CLINIC | Age: 80
End: 2025-01-22
Payer: MEDICARE

## 2025-01-22 DIAGNOSIS — R26.9 GAIT ABNORMALITY: Primary | ICD-10-CM

## 2025-01-22 NOTE — PROGRESS NOTES
Physical Therapy Daily Note      Mango Ling was seen by Anjum Harris PT at [facility] [address] [facility] PHYSICAL THERAPY  [address] [facility] [place] [address]  Fax [phone]  Phone [phone]       Diagnosis Plan   1. Gait abnormality            VISIT#: 2  Referring practitioner: Joseph F Seipel, MD    Subjective   Mango Lnig reports: no change in status from evaluation; dong HEP with assistance of wife.        Objective     See Exercise, Manual, and Modality Logs for complete treatment.     Patient Education:  Discussed HEP progression - advsied to buy 2-3 lb cuff weights for home use.      Assessment/Plan  - Gait belt for safety with balance exercises  - rests frequently, but tolerated well.    Progress strengthening /stabilization /functional activity            Timed:         Manual Therapy:         mins  59839;     Therapeutic Exercise:    10     mins  95573;     Neuromuscular Elizabeth:   20     mins  46036;    Therapeutic Activity:    10      mins  76785;     Gait Training:           mins  34847;     Ultrasound:          mins  28556;    Ionto                                   mins   79685  Self Care                            mins   50982  Aquatic                               mins 08508    Un-Timed:  Canalith Repos                   mins  63414  Electrical Stimulation:         mins  32603 ( );  Dry Needling         mins self-pay  Traction          mins 44565  Low Eval          Mins  09767  Mod Eval          Mins  96747  High Eval                            Mins  71200  Re-Eval                               mins  66937    Timed Treatment:   40   mins   Total Treatment:    40    mins    Anjum Harris PT PT, DPT, [place] License #: [id number]

## 2025-01-27 ENCOUNTER — TREATMENT (OUTPATIENT)
Dept: PHYSICAL THERAPY | Facility: CLINIC | Age: 80
End: 2025-01-27
Payer: MEDICARE

## 2025-01-27 DIAGNOSIS — R26.9 GAIT ABNORMALITY: Primary | ICD-10-CM

## 2025-01-27 PROCEDURE — 97112 NEUROMUSCULAR REEDUCATION: CPT | Performed by: PHYSICAL THERAPIST

## 2025-01-27 PROCEDURE — 97530 THERAPEUTIC ACTIVITIES: CPT | Performed by: PHYSICAL THERAPIST

## 2025-01-27 PROCEDURE — 97110 THERAPEUTIC EXERCISES: CPT | Performed by: PHYSICAL THERAPIST

## 2025-01-27 NOTE — PROGRESS NOTES
Physical Therapy Daily Note      Mango Ling was seen by Anjum Harris PT at [facility] [address] [facility] PHYSICAL THERAPY  [address] [facility] [place] [address]  Fax [phone]  Phone [phone]       Diagnosis Plan   1. Gait abnormality            VISIT#: 3  Referring practitioner: Joseph F Seipel, MD    Subjective   Mango Ling reports: I get tired quicker, but no overall change.        Objective     See Exercise, Manual, and Modality Logs for complete treatment.     Patient Education:  Given YTB for shoulder row and ER B: 2/10 ea, daily      Assessment/Plan  - Gait belt for safety with balance exercises  - rests frequently, but tolerated well.  - updated HEP; LOB x 1 during gait.    Progress strengthening /stabilization /functional activity            Timed:         Manual Therapy:         mins  09955;     Therapeutic Exercise:    15     mins  67755;     Neuromuscular Elizabeth:   20     mins  25708;    Therapeutic Activity:    10      mins  59840;     Gait Training:           mins  00594;     Ultrasound:          mins  72257;    Ionto                                   mins   36097  Self Care                            mins   74401  Aquatic                               mins 84570    Un-Timed:  Canalith Repos                   mins  19914  Electrical Stimulation:         mins  16498 ( );  Dry Needling         mins self-pay  Traction          mins 68232  Low Eval          Mins  30388  Mod Eval          Mins  22262  High Eval                            Mins  05834  Re-Eval                               mins  55046    Timed Treatment:   45   mins   Total Treatment:    45    mins    Anjum Harris PT PT, DPT, [place] License #: [id number]

## 2025-01-29 ENCOUNTER — TREATMENT (OUTPATIENT)
Dept: PHYSICAL THERAPY | Facility: CLINIC | Age: 80
End: 2025-01-29
Payer: MEDICARE

## 2025-01-29 DIAGNOSIS — R26.9 GAIT ABNORMALITY: Primary | ICD-10-CM

## 2025-01-29 NOTE — PROGRESS NOTES
Physical Therapy Daily Note      Mango Ling was seen by Anjum Harris, PT at [facility] [place] [facility] [address] [facility] PHYSICAL THERAPY  [address] [facility] [place] [address]  Fax [phone]  Phone [phone]       Diagnosis Plan   1. Gait abnormality            VISIT#: 4  Referring practitioner: Joseph F Seipel, MD    Subjective   Mango Ling reports: muscle soreness after last visit, but better today.. tripped over [facility] but no LOC or significant injury.        Objective     See Exercise, Manual, and Modality Logs for complete treatment.     Patient Education:  Given YTB for shoulder row and ER B: 2/10 ea, daily      Assessment/Plan  - Gait belt for safety with balance exercises  - decreased intensity of step ups due to fatigue and prior discomfort..      Goals  Plan Goals: SHORT TERM GOALS:   5 weeks  1. Pt will be compliant with HEP.. MET  2. Pt will have DGI score of 15/24 in order to decrease risk for falls.  3.  Pt to perform 10 x STS in 20 sec to demonstrate improve safety with positional changes.  4. Pt will be able to ambulate for 200 ft without rest break due to improved endurance and strength.  5. Pt will demonstrate 4-/5 B LE strength in order to improve gait, transfers and stair climbing ability.                            LONG TERM GOALS:  10 weeks  1.Pt will be able to ambulate for 500 ft or greater with improve step length and heel strike in order to decrease fall risk.  2.  Pt to score at least 40 on LEON assessment.  3. Pt will be able to perform 20 sit-stand without use of UE due to improved strength.  4. Pt will be able to stand on foam for 30 sec or greater with eyes open due to improved balance.  5. Pt will improve  score 21/24 on DGI to improve gait mechanics, strength and balance.      Progress strengthening /stabilization /functional activity            Timed:         Manual Therapy:         mins  93987;     Therapeutic Exercise:    15     mins  55726;     Neuromuscular Elizabeth:    15     mins  91970;    Therapeutic Activity:    10      mins  01342;     Gait Training:           mins  33673;     Ultrasound:          mins  93341;    Ionto                                   mins   82205  Self Care                            mins   72862  Aquatic                               mins 69601    Un-Timed:  Canalith Repos                   mins  62030  Electrical Stimulation:         mins  43398 ( );  Dry Needling         mins self-pay  Traction          mins 13089  Low Eval          Mins  57578  Mod Eval          Mins  55684  High Eval                            Mins  57816  Re-Eval                               mins  48152    Timed Treatment:   40   mins   Total Treatment:    40    mins    Anjum Harris PT PT, DPT, [place] License #: [id number]

## 2025-02-03 ENCOUNTER — TREATMENT (OUTPATIENT)
Dept: PHYSICAL THERAPY | Facility: CLINIC | Age: 80
End: 2025-02-03
Payer: MEDICARE

## 2025-02-03 DIAGNOSIS — R26.9 GAIT ABNORMALITY: Primary | ICD-10-CM

## 2025-02-03 PROCEDURE — 97530 THERAPEUTIC ACTIVITIES: CPT | Performed by: PHYSICAL THERAPIST

## 2025-02-03 PROCEDURE — 97112 NEUROMUSCULAR REEDUCATION: CPT | Performed by: PHYSICAL THERAPIST

## 2025-02-03 PROCEDURE — 97110 THERAPEUTIC EXERCISES: CPT | Performed by: PHYSICAL THERAPIST

## 2025-02-03 NOTE — PROGRESS NOTES
Physical Therapy Daily Note      Mango Ling was seen by Anjum Harris, PT at [facility] [place] [facility] [address] [facility] PHYSICAL THERAPY  [address] [facility] [place] [address]  Fax [phone]  Phone [phone]       Diagnosis Plan   1. Gait abnormality            VISIT#: 5  Referring practitioner: Joseph F Seipel, MD    Subjective   Mango Ling reports: has been doing HEP with wife. Voiced no complaints.        Objective     See Exercise, Manual, and Modality Logs for complete treatment.     Patient Education:  Discussed POC and importance of consistent exerice to improve.       Assessment/Plan  - Gait belt for safety with balance exercises  - better tolerance to exercise with less fatigue.  - will assess remainder of STG's on next visit.      Goals  Plan Goals: SHORT TERM GOALS:   5 weeks  1. Pt will be compliant with HEP.. MET  2. Pt will have DGI score of 15/24 in order to decrease risk for falls.  3.  Pt to perform 10 x STS in 20 sec to demonstrate improve safety with positional changes.  4. Pt will be able to ambulate for 200 ft without rest break due to improved endurance and strength.  5. Pt will demonstrate 4-/5 B LE strength in order to improve gait, transfers and stair climbing ability.                            LONG TERM GOALS:  10 weeks  1.Pt will be able to ambulate for 500 ft or greater with improve step length and heel strike in order to decrease fall risk.  2.  Pt to score at least 40 on LEON assessment.  3. Pt will be able to perform 20 sit-stand without use of UE due to improved strength.  4. Pt will be able to stand on foam for 30 sec or greater with eyes open due to improved balance.  5. Pt will improve  score 21/24 on DGI to improve gait mechanics, strength and balance.      Progress strengthening /stabilization /functional activity            Timed:         Manual Therapy:         mins  27239;     Therapeutic Exercise:    15     mins  62631;     Neuromuscular Elizabeth:   15     mins   76036;    Therapeutic Activity:    10      mins  71557;     Gait Training:           mins  85506;     Ultrasound:          mins  64600;    Ionto                                   mins   37468  Self Care                            mins   68413  Aquatic                               mins 60335    Un-Timed:  Canalith Repos                   mins  48967  Electrical Stimulation:         mins  73161 ( );  Dry Needling         mins self-pay  Traction          mins 68168  Low Eval          Mins  98866  Mod Eval          Mins  82087  High Eval                            Mins  99503  Re-Eval                               mins  18732    Timed Treatment:   40   mins   Total Treatment:    40    mins    Anjum Harris PT PT, DPT, [place] License #: [id number]

## 2025-02-05 ENCOUNTER — TREATMENT (OUTPATIENT)
Dept: PHYSICAL THERAPY | Facility: CLINIC | Age: 80
End: 2025-02-05
Payer: MEDICARE

## 2025-02-05 DIAGNOSIS — R26.9 GAIT ABNORMALITY: Primary | ICD-10-CM

## 2025-02-05 NOTE — PROGRESS NOTES
Physical Therapy Daily Note      Mango Ling was seen by Anjum Harris, PT [place] [facility] [place] [facility] [address] [facility] PHYSICAL THERAPY  [address] [facility] [place] [address]  Fax [phone]  Phone [phone]       Diagnosis Plan   1. Gait abnormality            VISIT#: 6  Referring practitioner: Joseph F Seipel, MD    Subjective   Mango Ling reports: doing HEP regularly with wife. No falls or complications.      Objective     See Exercise, Manual, and Modality Logs for complete treatment.     Patient Education:  Discussed POC and importance of consistent exerice to improve.       Assessment/Plan  - Gait belt for safety with balance exercises  - better tolerance to exercise with less fatigue.  - due to time frame, will assess remainder of STG's on next visit.      Goals  Plan Goals: SHORT TERM GOALS:   5 weeks  1. Pt will be compliant with HEP.. MET  2. Pt will have DGI score of 15/24 in order to decrease risk for falls.  3.  Pt to perform 10 x STS in 20 sec to demonstrate improve safety with positional changes.  4. Pt will be able to ambulate for 200 ft without rest break due to improved endurance and strength.  5. Pt will demonstrate 4-/5 B LE strength in order to improve gait, transfers and stair climbing ability.                            LONG TERM GOALS:  10 weeks  1.Pt will be able to ambulate for 500 ft or greater with improve step length and heel strike in order to decrease fall risk.  2.  Pt to score at least 40 on LEON assessment.  3. Pt will be able to perform 20 sit-stand without use of UE due to improved strength.  4. Pt will be able to stand on foam for 30 sec or greater with eyes open due to improved balance.  5. Pt will improve  score 21/24 on DGI to improve gait mechanics, strength and balance.      Progress strengthening /stabilization /functional activity            Timed:         Manual Therapy:         mins  77122;     Therapeutic Exercise:    15     mins  69107;     Neuromuscular  Elizabeth:   15     mins  00069;    Therapeutic Activity:    15      mins  53003;     Gait Training:           mins  83389;     Ultrasound:          mins  50513;    Ionto                                   mins   98229  Self Care                            mins   82294  Aquatic                               mins 95354    Un-Timed:  Canalith Repos                   mins  24095  Electrical Stimulation:         mins  30193 ( );  Dry Needling         mins self-pay  Traction          mins 17528  Low Eval          Mins  36425  Mod Eval          Mins  43095  High Eval                            Mins  35877  Re-Eval                               mins  17061    Timed Treatment:   45   mins   Total Treatment:    45   mins    Anjum Harris PT PT, DPT, [place] License #: [id number]

## 2025-02-10 ENCOUNTER — TREATMENT (OUTPATIENT)
Dept: PHYSICAL THERAPY | Facility: CLINIC | Age: 80
End: 2025-02-10
Payer: MEDICARE

## 2025-02-10 DIAGNOSIS — R26.9 GAIT ABNORMALITY: Primary | ICD-10-CM

## 2025-02-10 PROCEDURE — 97530 THERAPEUTIC ACTIVITIES: CPT | Performed by: PHYSICAL THERAPIST

## 2025-02-10 PROCEDURE — 97110 THERAPEUTIC EXERCISES: CPT | Performed by: PHYSICAL THERAPIST

## 2025-02-10 PROCEDURE — 97112 NEUROMUSCULAR REEDUCATION: CPT | Performed by: PHYSICAL THERAPIST

## 2025-02-10 NOTE — PROGRESS NOTES
Physical Therapy Progress Note      Mango Ling was seen by Anjum Harris, PT at [facility] [place] [facility] [address] [facility] PHYSICAL THERAPY  [address] [facility] [place] [address]  Fax [phone]  Phone [phone]       Diagnosis Plan   1. Gait abnormality            VISIT#: 7  Referring practitioner: Joseph F Seipel, MD    Subjective   Mango Ling reports: doing HEP regularly with wife. No falls or complications.      Objective   Able to ambulate 250 ft without rest break in gym  MMT: hips and knees 4-/5 or greater; PF still limited.  DGI: 14/24 (fall risk)    See Exercise, Manual, and Modality Logs for complete treatment.     Patient Education:  Discussed POC and importance of consistent exerice to improve.       Assessment/Plan  - Gait belt for safety with balance exercises  - STG's either met or progressing.- see below for details.  - showing consistent, but slow progress.  - will continue current POC      Goals  Plan Goals: SHORT TERM GOALS:   5 weeks  1. Pt will be compliant with HEP.. MET  2. Pt will have DGI score of 15/24 in order to decrease risk for falls. Progressing, but not met.  3.  Pt to perform 10 x STS in 20 sec to demonstrate improve safety with positional changes: Progressing able to perform 7 reps in 20 secs.  4. Pt will be able to ambulate for 200 ft without rest break due to improved endurance and strength - MET  5. Pt will demonstrate 4-/5 B LE strength in order to improve gait, transfers and stair climbing ability - MET                            LONG TERM GOALS:  10 weeks  1.Pt will be able to ambulate for 500 ft or greater with improve step length and heel strike in order to decrease fall risk.  2.  Pt to score at least 40 on LEON assessment.  3. Pt will be able to perform 20 sit-stand without use of UE due to improved strength.  4. Pt will be able to stand on foam for 30 sec or greater with eyes open due to improved balance.  5. Pt will improve  score 21/24 on DGI to improve gait  mechanics, strength and balance.      Progress strengthening /stabilization /functional activity            Timed:         Manual Therapy:         mins  51994;     Therapeutic Exercise:    10     mins  63697;     Neuromuscular Elizabeth:   15     mins  48874;    Therapeutic Activity:    20      mins  76117;     Gait Training:           mins  18556;     Ultrasound:          mins  67641;    Ionto                                   mins   38230  Self Care                            mins   33746  Aquatic                               mins 47412    Un-Timed:  Canalith Repos                   mins  52846  Electrical Stimulation:         mins  31093 ( );  Dry Needling         mins self-pay  Traction          mins 71739  Low Eval          Mins  24612  Mod Eval          Mins  24117  High Eval                            Mins  65559  Re-Eval                               mins  72994    Timed Treatment:   45   mins   Total Treatment:    45   mins    Anjum Harris PT PT, DPT, [place] License #: [id number]

## 2025-02-10 NOTE — LETTER
Patient: Samantha Favre    YOB: 1927    Date: 2/13/18    Patient Active Problem List    Diagnosis Date Noted    CKD (chronic kidney disease) 03/18/2015     Priority: High     Overview Note:     Stage G3a A2      HLD (hyperlipidemia) 03/18/2015     Priority: High    HTN (hypertension) 11/29/2011     Priority: High    Diabetes mellitus 11/29/2011     Priority: High    Lumbar spinal stenosis 12/22/2016     Priority: Medium    Chronic low back pain 08/17/2016     Priority: Medium    Eczematous dermatitis      Priority: Low    Vitamin D deficiency 03/18/2015     Priority: Low    SI (stress incontinence), female 05/29/2012     Priority: Low    Osteoarthritis 05/29/2012     Priority: Low    Hypercalcemia 01/15/2018     Past Medical History:   Diagnosis Date    Arthritis     Chicken pox     Chronic back pain     Chronic low back pain 8/17/2016    Eczematous dermatitis     History of bladder infections     Hyperlipidemia     Hypertension     Measles     Mumps     Osteoarthritis 5/29/2012    SCC (squamous cell carcinoma), arm 2013    right upper arm, 2015 right forarm    SI (stress incontinence), female 5/29/2012    Type II or unspecified type diabetes mellitus without mention of complication, not stated as uncontrolled     Whooping cough      Past Surgical History:   Procedure Laterality Date    ANKLE SURGERY      broken left ankle.  APPENDECTOMY      BREAST BIOPSY      x2 left breast    CATARACT REMOVAL  2004    bilateral    CYSTOCELE REPAIR      EYE SURGERY      bilateral cataract    HYSTERECTOMY      complete at age 39    [address]      as a child     Family History   Problem Relation Age of Onset    Diabetes Mother     Heart Disease Father      Social History     Social History    Marital status:      Spouse name: N/A    Number of children: N/A    Years of education: N/A     Occupational History    Not on file.      Social History Physical Therapy Progress Note      Mango Ling was seen by Anjum Harris, PT at [facility] [place] [facility] [address] [facility] PHYSICAL THERAPY  [address] [facility] [place] [address]  Fax [phone]  Phone [phone]       Diagnosis Plan   1. Gait abnormality            VISIT#: 7  Referring practitioner: Joseph F Seipel, MD    Subjective   Mango Ling reports: doing HEP regularly with wife. No falls or complications.      Objective   Able to ambulate 250 ft without rest break in gym  MMT: hips and knees 4-/5 or greater; PF still limited.  DGI: 14/24 (fall risk)    See Exercise, Manual, and Modality Logs for complete treatment.     Patient Education:  Discussed POC and importance of consistent exerice to improve.       Assessment/Plan  - Gait belt for safety with balance exercises  - STG's either met or progressing.- see below for details.  - showing consistent, but slow progress.  - will continue current POC      Goals  Plan Goals: SHORT TERM GOALS:   5 weeks  1. Pt will be compliant with HEP.. MET  2. Pt will have DGI score of 15/24 in order to decrease risk for falls. Progressing, but not met.  3.  Pt to perform 10 x STS in 20 sec to demonstrate improve safety with positional changes: Progressing able to perform 7 reps in 20 secs.  4. Pt will be able to ambulate for 200 ft without rest break due to improved endurance and strength - MET  5. Pt will demonstrate 4-/5 B LE strength in order to improve gait, transfers and stair climbing ability - MET                            LONG TERM GOALS:  10 weeks  1.Pt will be able to ambulate for 500 ft or greater with improve step length and heel strike in order to decrease fall risk.  2.  Pt to score at least 40 on LEON assessment.  3. Pt will be able to perform 20 sit-stand without use of UE due to improved strength.  4. Pt will be able to stand on foam for 30 sec or greater with eyes open due to improved balance.  5. Pt will improve  score 21/24 on DGI to improve gait  Main Topics    Smoking status: Never Smoker    Smokeless tobacco: Never Used    Alcohol use No    Drug use: No    Sexual activity: Not on file     Other Topics Concern    Not on file     Social History Narrative    No narrative on file     Current Outpatient Prescriptions on File Prior to Visit   Medication Sig Dispense Refill    amLODIPine (NORVASC) 10 MG tablet Take 1 tablet by mouth daily 30 tablet 3    cloNIDine (CATAPRES) 0.2 MG tablet Take 1 tablet by mouth 3 times daily 60 tablet 3    furosemide (LASIX) 20 MG tablet Take 0.5 tablets by mouth daily 60 tablet 3    hydrALAZINE (APRESOLINE) 10 MG tablet Take 1 tablet by mouth every 8 hours 90 tablet 3    magnesium hydroxide (MILK OF MAGNESIA) 400 MG/5ML suspension Take 30 mLs by mouth daily as needed for Constipation      lisinopril (PRINIVIL;ZESTRIL) 10 MG tablet Take 1 tablet by mouth daily 30 tablet 3    TRUEPLUS LANCETS 28G MISC TEST TWO TIMES DAILY 200 each 3    metFORMIN (GLUCOPHAGE) 1000 MG tablet Take 1 tablet by mouth 2 times daily (with meals) 180 tablet 3    atorvastatin (LIPITOR) 10 MG tablet take 1 tablet by mouth once daily 90 tablet 3    metoprolol tartrate (LOPRESSOR) 25 MG tablet Take 1 tablet by mouth 2 times daily 180 tablet 3    Incontinence Supply Disposable (DEPEND UNDERGARMENTS) MISC 1 each by Does not apply route nightly 1 Package 11    Glucose Blood (BLOOD GLUCOSE TEST STRIPS) STRP Test 2-3 x per day  Dx: E11.9 200 strip 5    Blood Glucose Monitoring Suppl (TRUE METRIX AIR GLUCOSE METER) W/DEVICE KIT       Blood Glucose Monitoring Suppl PRUDENCIO Dx: E11.9 1 Device 0    Compression Stockings MISC by Does not apply route Knee high, 20-30 mmHg 1 each 1    Omega-3 Fatty Acids (FISH OIL) 1200 MG CAPS Take  by mouth daily.  aspirin 81 MG tablet Take 81 mg by mouth daily.       fluticasone (FLONASE) 50 MCG/ACT nasal spray instill 2 sprays into each nostril once daily 16 g 1     No current facility-administered medications mechanics, strength and balance.      Progress strengthening /stabilization /functional activity            Timed:         Manual Therapy:         mins  80926;     Therapeutic Exercise:    10     mins  10062;     Neuromuscular Elizabeth:   15     mins  95644;    Therapeutic Activity:    20      mins  25913;     Gait Training:           mins  44198;     Ultrasound:          mins  18848;    Ionto                                   mins   36473  Self Care                            mins   47085  Aquatic                               mins 78909    Un-Timed:  Canalith Repos                   mins  03892  Electrical Stimulation:         mins  55621 ( );  Dry Needling         mins self-pay  Traction          mins 20258  Low Eval          Mins  54651  Mod Eval          Mins  44583  High Eval                            Mins  16510  Re-Eval                               mins  31787    Timed Treatment:   45   mins   Total Treatment:    45   mins    Anjum Harris PT PT, DPT, [place] License #: [id number]

## 2025-02-12 ENCOUNTER — TREATMENT (OUTPATIENT)
Dept: PHYSICAL THERAPY | Facility: CLINIC | Age: 80
End: 2025-02-12
Payer: MEDICARE

## 2025-02-12 DIAGNOSIS — R26.9 GAIT ABNORMALITY: Primary | ICD-10-CM

## 2025-02-12 NOTE — PROGRESS NOTES
Physical Therapy Treatment Note      Mango Ling was seen by Anjum Harris, PT [place] [facility] [place] [facility] [address] [facility] PHYSICAL THERAPY  [address] [facility] [place] [address]  Fax [phone]  Phone [phone]       Diagnosis Plan   1. Gait abnormality            VISIT#: 8  Referring practitioner: Joseph F Seipel, MD    Subjective   Mango Ling reports: still feeling tired; wife states he is more resistant to exercise recently and has c/o fatigue...      Objective   Able to ambulate 250 ft without rest break in gym  MMT: hips and knees 4-/5 or greater; PF still limited.  DGI: 14/24 (fall risk)    See Exercise, Manual, and Modality Logs for complete treatment.     Patient Education:  Discussed POC and importance of consistent exerice to improve.       Assessment/Plan  - Gait belt for safety with balance exercises  - advised to f/u with PCP if fatigue worsens or does not resolve.  - extremely tight hip flexors from posture deficits and sedentary lifestyle.      Goals  Plan Goals: SHORT TERM GOALS:   5 weeks  1. Pt will be compliant with HEP.. MET  2. Pt will have DGI score of 15/24 in order to decrease risk for falls. Progressing, but not met.  3.  Pt to perform 10 x STS in 20 sec to demonstrate improve safety with positional changes: Progressing able to perform 7 reps in 20 secs.  4. Pt will be able to ambulate for 200 ft without rest break due to improved endurance and strength - MET  5. Pt will demonstrate 4-/5 B LE strength in order to improve gait, transfers and stair climbing ability - MET                            LONG TERM GOALS:  10 weeks  1.Pt will be able to ambulate for 500 ft or greater with improve step length and heel strike in order to decrease fall risk.  2.  Pt to score at least 40 on LEON assessment.  3. Pt will be able to perform 20 sit-stand without use of UE due to improved strength.  4. Pt will be able to stand on foam for 30 sec or greater with eyes open due to improved  balance.  5. Pt will improve  score 21/24 on DGI to improve gait mechanics, strength and balance.      Progress strengthening /stabilization /functional activity            Timed:         Manual Therapy:     5    mins  27177;     Therapeutic Exercise:    10     mins  81973;     Neuromuscular Elizabeth:   10     mins  28571;    Therapeutic Activity:    20      mins  78700;     Gait Training:           mins  06911;     Ultrasound:          mins  33883;    Ionto                                   mins   46007  Self Care                            mins   55401  Aquatic                               mins 27709    Un-Timed:  Canalith Repos                   mins  02036  Electrical Stimulation:         mins  55593 ( );  Dry Needling         mins self-pay  Traction          mins 70713  Low Eval          Mins  50823  Mod Eval          Mins  51699  High Eval                            Mins  46346  Re-Eval                               mins  21576    Timed Treatment:   45   mins   Total Treatment:    45   mins    Anjum Harris PT PT, DPT, [place] License #: [id number]

## 2025-02-17 ENCOUNTER — TREATMENT (OUTPATIENT)
Dept: PHYSICAL THERAPY | Facility: CLINIC | Age: 80
End: 2025-02-17
Payer: MEDICARE

## 2025-02-17 DIAGNOSIS — R26.9 GAIT ABNORMALITY: Primary | ICD-10-CM

## 2025-02-17 PROCEDURE — 97530 THERAPEUTIC ACTIVITIES: CPT | Performed by: PHYSICAL THERAPIST

## 2025-02-17 PROCEDURE — 97112 NEUROMUSCULAR REEDUCATION: CPT | Performed by: PHYSICAL THERAPIST

## 2025-02-17 PROCEDURE — 97110 THERAPEUTIC EXERCISES: CPT | Performed by: PHYSICAL THERAPIST

## 2025-02-17 NOTE — PROGRESS NOTES
Physical Therapy Daily Treatment Note   [facility], [address] [place] [address]     Patient: Mango Ling   : 1945  Referring practitioner: Joseph F Seipel, MD  Diagnosis:      ICD-10-CM ICD-9-CM   1. Gait abnormality  R26.9 781.2     Date of Initial Visit: Type: THERAPY  Noted: 2025  Today's Date: 2025    VISIT#: 9          Subjective   Mango Ling reports: he has noticed a more deliberate step and improved balance now.        Objective   See Exercise, Manual, and Modality Logs for complete treatment.       Assessment & Plan       Assessment  Assessment details: Pt tolerated session without fatigue noted. Progressed to standing hs curls today.  Pt had 88 bpm HR and 98% O2 sats after step ups today.  Added hip stretch with decreased pain noted.            Progress per Plan of Care           Timed:  Manual Therapy:         mins  97165;  Therapeutic Exercise:    15     mins  72717;     Neuromuscular Elizabeth:    13    mins  38843;    Therapeutic Activity:     10     mins  95385;     Gait Training:           mins  34119;     Ultrasound:          mins  62462;    Electrical Stimulation:         mins  81658 ( );    Untimed:  Electrical Stimulation:         mins  90924 ( );  Mechanical Traction:         mins  89128;   Dry needling:       Self pay    Timed Treatment:   38   mins   Total Treatment:     38   mins  Sara Proctor PT, DPT, CLT, CIDN  Physical Therapist

## 2025-02-24 ENCOUNTER — TREATMENT (OUTPATIENT)
Dept: PHYSICAL THERAPY | Facility: CLINIC | Age: 80
End: 2025-02-24
Payer: MEDICARE

## 2025-02-24 DIAGNOSIS — R26.9 GAIT ABNORMALITY: Primary | ICD-10-CM

## 2025-02-24 PROCEDURE — 97530 THERAPEUTIC ACTIVITIES: CPT | Performed by: PHYSICAL THERAPIST

## 2025-02-24 PROCEDURE — 97110 THERAPEUTIC EXERCISES: CPT | Performed by: PHYSICAL THERAPIST

## 2025-02-24 PROCEDURE — 97112 NEUROMUSCULAR REEDUCATION: CPT | Performed by: PHYSICAL THERAPIST

## 2025-02-24 NOTE — PROGRESS NOTES
Physical Therapy Daily Treatment Note   [facility], [address] [place] [address]     Patient: Mango Ling   : 1945  Referring practitioner: Joseph F Seipel, MD  Diagnosis:      ICD-10-CM ICD-9-CM   1. Gait abnormality  R26.9 781.2     Date of Initial Visit: Type: THERAPY  Noted: 2025  Today's Date: 2025    VISIT#: 10          Subjective   Mango Ling reports: he forgot his cane today.  He started having pain in his left shoulder about a week ago and feels it may be from lifting the trash out of the garbage bin.      Objective   See Exercise, Manual, and Modality Logs for complete treatment.       Assessment & Plan       Assessment  Assessment details: Progressed strengthening with increased resistance on exercises and higher step height on step ups today.            Progress per Plan of Care and Progress strengthening /stabilization /functional activity           Timed:  Manual Therapy:         mins  51814;  Therapeutic Exercise:    15     mins  00430;     Neuromuscular Elizabeth:    13    mins  25410;    Therapeutic Activity:     10     mins  15134;     Gait Training:           mins  95863;     Ultrasound:          mins  34487;    Electrical Stimulation:         mins  35065 ( );    Untimed:  Electrical Stimulation:         mins  02391 ( );  Mechanical Traction:         mins  43022;   Dry needling:       Self pay    Timed Treatment:   38   mins   Total Treatment:     38   mins  Sara Proctor PT, DPT, CLT, CIDN  Physical Therapist

## 2025-03-05 ENCOUNTER — TELEPHONE (OUTPATIENT)
Dept: PHYSICAL THERAPY | Facility: CLINIC | Age: 80
End: 2025-03-05

## 2025-03-05 NOTE — TELEPHONE ENCOUNTER
Caller: Vesna Ling    Relationship: Emergency Contact         What was the call regarding: HAS A COLD CAN NOT COME OUT IN WEATHER

## 2025-03-11 ENCOUNTER — TREATMENT (OUTPATIENT)
Dept: PHYSICAL THERAPY | Facility: CLINIC | Age: 80
End: 2025-03-11
Payer: MEDICARE

## 2025-03-11 DIAGNOSIS — R26.9 GAIT ABNORMALITY: Primary | ICD-10-CM

## 2025-03-11 NOTE — PROGRESS NOTES
Physical Therapy Progress  Note/Re-assessment   [address] [facility] [address] [place] [address]     Patient: Mango Ling   : 1945  Referring practitioner: Joseph F Seipel, MD  Date of Initial Visit: Type: THERAPY  Noted: 2025  Today's Date: 3/11/2025    VISIT#: 11          Subjective   Mango Ling reports: no pain today, but getting over a cold or sinus infection.      Objective   See Exercise, Manual, and Modality Logs for complete treatment.       Assessment/Plan      Progress per Plan of Care and Progress strengthening /stabilization /functional activity           Timed:  Manual Therapy:         mins  08542;  Therapeutic Exercise:    15     mins  75651;     Neuromuscular Elizabeth:    8    mins  67053;    Therapeutic Activity:     10     mins  50824;     Gait Training:           mins  34163;     Ultrasound:          mins  94517;    Electrical Stimulation:         mins  84396 ( );    Untimed:  Electrical Stimulation:         mins  90285 ( );  Mechanical Traction:         mins  15957;   Dry needling:       Self pay    Timed Treatment:   33   mins   Total Treatment:     33   mins  Sara Proctor PT, DPT, CLT, CIDN  Physical Therapist

## 2025-03-14 ENCOUNTER — TREATMENT (OUTPATIENT)
Dept: PHYSICAL THERAPY | Facility: CLINIC | Age: 80
End: 2025-03-14
Payer: MEDICARE

## 2025-03-14 DIAGNOSIS — R26.9 GAIT ABNORMALITY: Primary | ICD-10-CM

## 2025-03-14 NOTE — PROGRESS NOTES
Physical Therapy Daily Treatment Note   [facility], [address] [place] [address]     Patient: Mango Ling   : 1945  Referring practitioner: Joseph F Seipel, MD  Diagnosis:      ICD-10-CM ICD-9-CM   1. Gait abnormality  R26.9 781.2     Date of Initial Visit: Type: THERAPY  Noted: 2025  Today's Date: 3/14/2025    VISIT#: 12          Subjective   Mango Ling reports: no pain today, just some fatigue noted in his legs.      Objective   See Exercise, Manual, and Modality Logs for complete treatment.       Assessment & Plan       Assessment  Assessment details: Pt tolerated increased reps today for strengthening.            Progress per Plan of Care and Progress strengthening /stabilization /functional activity           Timed:  Manual Therapy:         mins  48072;  Therapeutic Exercise:    15     mins  00857;     Neuromuscular Elizabeth:    13    mins  01387;    Therapeutic Activity:     10     mins  87157;     Gait Training:           mins  86493;     Ultrasound:          mins  79154;    Electrical Stimulation:         mins  73813 ( );    Untimed:  Electrical Stimulation:         mins  54884 ( );  Mechanical Traction:         mins  21608;   Dry needling:       Self pay    Timed Treatment:   38   mins   Total Treatment:     38   mins  Sara Proctor, PT, DPT, CLT, CIDN  Physical Therapist

## 2025-03-18 ENCOUNTER — TREATMENT (OUTPATIENT)
Dept: PHYSICAL THERAPY | Facility: CLINIC | Age: 80
End: 2025-03-18
Payer: MEDICARE

## 2025-03-18 DIAGNOSIS — R26.9 GAIT ABNORMALITY: Primary | ICD-10-CM

## 2025-03-18 NOTE — PROGRESS NOTES
Physical Therapy Daily Treatment Note   [facility], [address] [place] [address]     Patient: Mango Ling   : 1945  Referring practitioner: Joseph F Seipel, MD  Diagnosis:      ICD-10-CM ICD-9-CM   1. Gait abnormality  R26.9 781.2     Date of Initial Visit: Type: THERAPY  Noted: 2025  Today's Date: 3/18/2025    VISIT#: 13          Subjective   Mango Ling reports: no pain today and no falls.      Objective   See Exercise, Manual, and Modality Logs for complete treatment.       Assessment & Plan       Assessment  Assessment details: Pt tolerated session with some rest breaks needed for fatigue.  Cues needed to extend knees in standing/weight bearing and lift toes during gait.            Progress per Plan of Care and Progress strengthening /stabilization /functional activity           Timed:  Manual Therapy:         mins  46184;  Therapeutic Exercise:    15     mins  86271;     Neuromuscular Elizabeth:    13    mins  61533;    Therapeutic Activity:     10     mins  90034;     Gait Training:           mins  87465;     Ultrasound:          mins  36183;    Electrical Stimulation:         mins  91878 ( );    Untimed:  Electrical Stimulation:         mins  02178 ( );  Mechanical Traction:         mins  35229;   Dry needling:       Self pay    Timed Treatment:   38   mins   Total Treatment:     38   mins  Sara Proctor PT, DPT, CLT, CIDN  Physical Therapist

## 2025-03-20 ENCOUNTER — TREATMENT (OUTPATIENT)
Dept: PHYSICAL THERAPY | Facility: CLINIC | Age: 80
End: 2025-03-20
Payer: MEDICARE

## 2025-03-20 DIAGNOSIS — R26.9 GAIT ABNORMALITY: Primary | ICD-10-CM

## 2025-03-20 PROCEDURE — 97110 THERAPEUTIC EXERCISES: CPT | Performed by: PHYSICAL THERAPIST

## 2025-03-20 PROCEDURE — 97530 THERAPEUTIC ACTIVITIES: CPT | Performed by: PHYSICAL THERAPIST

## 2025-03-20 NOTE — PROGRESS NOTES
Physical Therapy Daily Treatment Note    Patient: Mango Ling   : 1945  Referring practitioner: Joseph F Seipel, MD  Diagnoses: Gait abnormality [R26.9]  Today's Date: 3/20/2025    VISIT#: 14    Subjective Evaluation    History of Present Illness  Mechanism of injury: Pt reports he is doing about the same          Objective     See Exercise, Manual, and Modality Logs for complete treatment.     Cont with ex, NMR as noted; pt did require rest breaks between standing and sitting ex     Assessment & Plan       Assessment  Assessment details: Daisha well with ex sitting and standing.  Did require rest breaks between some exercises; noted to be SOA after all standing ex       Progress per Plan of Care        Timed:         Manual Therapy:         mins  00771;     Therapeutic Exercise:   20      mins  80136;     Neuromuscular Elizabeth:        mins  57296;    Therapeutic Activity:    10      mins  84424;     Gait Training:           mins  59438;     Ultrasound:          mins  17618;    Ionto                                   mins   69888  Self Care                            mins   77366    Un-Timed:  Electrical Stimulation:         mins  26233 ( );  Traction          mins 72978  Canalith Repos                   mins  23139  Low Eval          Mins  30451  Mod Eval          Mins  86520  High Eval                            Mins  84258  Re-Eval                               mins  70010    Timed Treatment:  30    mins   Total Treatment:    30    mins    Danielle Hernandez PT  Physical Therapist  [place] PT Lic. # [id number]

## 2025-03-24 DIAGNOSIS — R41.3 MEMORY LOSS: ICD-10-CM

## 2025-03-24 RX ORDER — DONEPEZIL HYDROCHLORIDE 5 MG/1
5 TABLET, FILM COATED ORAL 2 TIMES DAILY
Qty: 60 TABLET | Refills: 0 | Status: SHIPPED | OUTPATIENT
Start: 2025-03-24

## 2025-03-25 ENCOUNTER — TREATMENT (OUTPATIENT)
Dept: PHYSICAL THERAPY | Facility: CLINIC | Age: 80
End: 2025-03-25
Payer: MEDICARE

## 2025-03-25 DIAGNOSIS — R26.9 GAIT ABNORMALITY: Primary | ICD-10-CM

## 2025-03-25 PROCEDURE — 97110 THERAPEUTIC EXERCISES: CPT | Performed by: PHYSICAL THERAPIST

## 2025-03-25 PROCEDURE — 97530 THERAPEUTIC ACTIVITIES: CPT | Performed by: PHYSICAL THERAPIST

## 2025-03-25 PROCEDURE — 97112 NEUROMUSCULAR REEDUCATION: CPT | Performed by: PHYSICAL THERAPIST

## 2025-03-25 NOTE — PROGRESS NOTES
Physical Therapy Daily Treatment Note   [facility], [address] [place] [address]     Patient: Mango Ling   : 1945  Referring practitioner: Joseph F Seipel, MD  Diagnosis:      ICD-10-CM ICD-9-CM   1. Gait abnormality  R26.9 781.2     Date of Initial Visit: Type: THERAPY  Noted: 2025  Today's Date: 3/25/2025    VISIT#: 15          Subjective   Mango Ling reports: no pain today and some improvement noted with walking and balance.      Objective   See Exercise, Manual, and Modality Logs for complete treatment.       Assessment & Plan       Assessment  Assessment details: Pt tolerated increased reps to improve mm endurance and strengthening.            Progress per Plan of Care and Progress strengthening /stabilization /functional activity           Timed:  Manual Therapy:         mins  43534;  Therapeutic Exercise:    15     mins  40475;     Neuromuscular Elizabeth:    10    mins  46378;    Therapeutic Activity:     13     mins  93795;     Gait Training:           mins  03185;     Ultrasound:          mins  94543;    Electrical Stimulation:         mins  84026 ( );    Untimed:  Electrical Stimulation:         mins  41733 ( );  Mechanical Traction:         mins  12005;   Dry needling:       Self pay    Timed Treatment:   38   mins   Total Treatment:     38   mins  Sara Proctor, PT, DPT, CLT, CIDN  Physical Therapist

## 2025-03-27 ENCOUNTER — OFFICE VISIT (OUTPATIENT)
Age: 80
End: 2025-03-27
Payer: MEDICARE

## 2025-03-27 ENCOUNTER — TELEPHONE (OUTPATIENT)
Dept: PHYSICAL THERAPY | Facility: CLINIC | Age: 80
End: 2025-03-27

## 2025-03-27 VITALS — WEIGHT: 230 LBS | HEIGHT: 70 IN | BODY MASS INDEX: 32.93 KG/M2 | RESPIRATION RATE: 20 BRPM

## 2025-03-27 DIAGNOSIS — M20.42 HAMMER TOE OF LEFT FOOT: ICD-10-CM

## 2025-03-27 DIAGNOSIS — L60.0 INGROWN TOENAIL: Primary | ICD-10-CM

## 2025-03-27 NOTE — TELEPHONE ENCOUNTER
Caller: Vesna Ling    Relationship:  Emergency Contact    PATIENT CALLED REQUESTING TO CANCEL SAME DAY APPT.    Did the patient call AFTER the start time of their scheduled appointment?  []YES  [x]NO    Was the patient's appointment rescheduled? []YES  [x]NO    Any additional information: PATIENT HAVING PROCEDURE DONE ON HIS TOE, WILL BE AT NEXT APPT

## 2025-03-27 NOTE — PROGRESS NOTES
03/27/2025  Foot and Ankle Surgery - New Patient   Provider: Dr. Bertrand Fernandes DPM  Location: [facility]    Subjective:  Mango Ling is a 79 y.o. male.     Chief Complaint   Patient presents with    Left Foot - Ingrown Toenail, Initial Evaluation     Off and on for 1 year    Initial Evaluation     PCP: Deon Palacio MD  Last PCP Visit: 3/25/25       79-year-old male complaining of pain to left hallux.  Patient complaining of tightness and pressure to both sides of the nail left hallux.  Patient states pain is worse with certain shoe gear and with socks.  Patient also complaining of toe contractures to all digits of his left foot.  Patient is in physical therapy for muscle weakness balance instability issues.  Patient states his toes curl under him while he is performing his exercises.  Patient denies any trauma to his lower extremity.  Patient denies any previous ingrown procedures.    Ingrown Toenail       No Known Allergies    Past Medical History:   Diagnosis Date    Alzheimer disease     Bladder stones     Hypertension     Sleep apnea     CPAP       Past Surgical History:   Procedure Laterality Date    BACK SURGERY      lower back    CYSTOSCOPY LITHOLAPAXY BLADDER STONE EXTRACTION N/A 10/30/2023    Procedure: CYSTOSCOPY LITHOLAPAXY BLADDER STONE EXTRACTION;  Surgeon: Ryan Romero MD;  Location: [facility] OR;  Service: Urology;  Laterality: N/A;    CYSTOSCOPY TRANSURETHRAL RESECTION OF PROSTATE N/A 10/30/2023    Procedure: CYSTOSCOPY TRANSURETHRAL RESECTION OF PROSTATE;  Surgeon: Ryan Romero MD;  Location: [facility] OR;  Service: Urology;  Laterality: N/A;    CYSTOSCOPY W/ LASER LITHOTRIPSY      TOTAL SHOULDER REPLACEMENT Right        Family History   Problem Relation Age of Onset    Alzheimer's disease Father        Social History     Socioeconomic History    Marital status:    Tobacco Use    Smoking status: Never    Smokeless tobacco: Never   Vaping Use    Vaping status: Never  Used   Substance and Sexual Activity    Alcohol use: Yes     Alcohol/week: 6.0 standard drinks of alcohol     Types: 6 Shots of liquor per week    Drug use: Not Currently    Sexual activity: Defer        Current Outpatient Medications on File Prior to Visit   Medication Sig Dispense Refill    donepezil (ARICEPT) 5 MG tablet TAKE 1 TABLET BY MOUTH TWICE DAILY 60 tablet 0    fluticasone (FLONASE) 50 MCG/ACT nasal spray       Gemtesa 75 MG tablet       latanoprost (XALATAN) 0.005 % ophthalmic solution       losartan (COZAAR) 100 MG tablet       memantine (NAMENDA) 5 MG tablet One tab daily for one month then one tab bid 60 tablet 5    nitrofurantoin, macrocrystal-monohydrate, (Macrobid) 100 MG capsule Take 1 capsule by mouth 2 (Two) Times a Day. 14 capsule 0    pantoprazole (PROTONIX) 40 MG EC tablet       timolol (TIMOPTIC) 0.5 % ophthalmic solution       triamterene-hydrochlorothiazide (MAXZIDE-25) 37.5-25 MG per tablet       vitamin B-12 (CYANOCOBALAMIN) 1000 MCG tablet Take 1 tablet by mouth Daily.      [DISCONTINUED] finasteride (PROSCAR) 5 MG tablet Take 1 tablet by mouth Daily.      [DISCONTINUED] docusate sodium (Colace) 100 MG capsule Take 1 capsule by mouth 2 (Two) Times a Day As Needed (post op and while taking narcotics). (Patient not taking: Reported on 12/10/2024) 30 capsule 1    [DISCONTINUED] hydroCHLOROthiazide 25 MG tablet  (Patient not taking: Reported on 12/10/2024)      [DISCONTINUED] ipratropium (ATROVENT) 0.03 % nasal spray  (Patient not taking: Reported on 12/10/2024)      [DISCONTINUED] phenazopyridine (Pyridium) 100 MG tablet Take 1 tablet by mouth 3 (Three) Times a Day As Needed (dysuria). (Patient not taking: Reported on 12/10/2024) 15 tablet 0    [DISCONTINUED] traMADol (ULTRAM) 50 MG tablet Take 1 tablet by mouth Every 6 (Six) Hours As Needed for Moderate Pain. (Patient not taking: Reported on 12/10/2024) 20 tablet 0     No current facility-administered medications on file prior to visit.  "        Objective   Resp 20   Ht 177.8 cm (70\")   Wt 104 kg (230 lb)   BMI 33.00 kg/m²     Foot/Ankle Exam    GENERAL  Appearance:  appears stated age  Orientation:  AAOx3  Affect:  appropriate  Gait:  unimpaired  Assistance:  independent  Right shoe gear: casual shoe  Left shoe gear: casual shoe    VASCULAR     Right Foot Vascularity   Normal vascular exam    Dorsalis pedis:  2+  Posterior tibial:  2+  Skin temperature:  warm  Edema grading:  None  CFT:  < 3 seconds  Pedal hair growth:  Present  Varicosities:  none     Left Foot Vascularity   Normal vascular exam    Dorsalis pedis:  2+  Posterior tibial:  2+  Skin temperature:  warm  Edema grading:  None  CFT:  < 3 seconds  Pedal hair growth:  Present  Varicosities:  none     NEUROLOGIC     Right Foot Neurologic   Normal sensation    Light touch sensation: normal  Vibratory sensation: normal  Hot/Cold sensation: normal  Normal reflexes    Achilles reflex:  2+  Babinski reflex:  2+     Left Foot Neurologic   Normal sensation    Light touch sensation: normal  Vibratory sensation: normal  Hot/Cold sensation:  normal  Normal reflexes    Achilles reflex:  2+  Babinski reflex:  2+    MUSCULOSKELETAL     Right Foot Musculoskeletal   Ecchymosis:  none  Tenderness:  none       Left Foot Musculoskeletal   Ecchymosis:  none  Tenderness:  toe 1 tenderness and toenail problem  Hammertoe:  Second toe, third toe, fourth toe and fifth toe    MUSCLE STRENGTH     Right Foot Muscle Strength   Normal strength    Foot dorsiflexion:  5  Foot plantar flexion:  5  Foot inversion:  5  Foot eversion:  5     Left Foot Muscle Strength   Normal strength    Foot dorsiflexion:  5  Foot plantar flexion:  5  Foot inversion:  5  Foot eversion:  5    RANGE OF MOTION     Right Foot Range of Motion   Foot and ankle ROM within normal limits       Left Foot Range of Motion   Foot and ankle ROM within normal limits      DERMATOLOGIC      Right Foot Dermatologic   Nails  1.  Normal.  2.  Normal.  3.  " Normal.  4.  Normal.  5.  Normal.     Left Foot Dermatologic   Skin  Positive for skin changes.   Nails  1.  Normal. Positive for ingrown toenail.  2.  Normal.  3.  Normal.  4.  Normal.  5.  Normal.        Assessment & Plan   Diagnoses and all orders for this visit:    1. Ingrown toenail (Primary)  -     Nail Removal    2. Hammer toe of left foot       Discussed with patient and wife diagnosis, prognosis, treatment options for pincer style nail with ingrown borders.  Patient electing for bilateral nail avulsion borders with phenol matricectomy.  Please see procedure note below.  Nail care instructions dispensed to patient.      Discussed with patient and wife diagnosis of hammertoe deformity left foot.  Patient is having instability with physical therapy exercises.  Discussed with patient treatment options including conservative and surgical.  Patient will continue with wider toebox shoes and crescent pad if needed.  Discussed possible flexor tenotomy if no improvement with physical therapy.  Patient would like to avoid any surgical intervention at this time.    Patient will follow-up in 2 weeks for follow-up left hallux ingrown removal.    Reviewed proper basic stretching and manual therapy exercises along with appropriate shoes and activity.  Discussed proper use and/or avoidance of OTC anti-inflammatories.  Patient is to call with any additional issues or concerns.  Greater than 30 minutes was spent before, during, and after evaluation for patient care.    I spent 5 minutes on the separately reported service of left hallux ingrown phenol matricectomy. This time is not included in the time used to support the E/M service also reported today.        Nail Removal    Date/Time: 3/27/2025 11:31 AM    Performed by: Mark Fernandes DPM  Authorized by: Mark Fernandes DPM  Location: left foot  Location details: left big toe  Anesthesia: digital block    Anesthesia:  Local Anesthetic: lidocaine 1% without  epinephrine  Anesthetic total: 5 mL    Sedation:  Patient sedated: no    Preparation: skin prepped with providone-iodine  Amount removed: partial  Side: medial and lateral  Wedge excision of skin of nail fold: no  Nail bed sutured: no  Nail matrix removed: partial  Removed nail replaced and anchored: no  Dressing: 4x4, antibiotic ointment and non-adhesive packing strip  Patient tolerance: patient tolerated the procedure well with no immediate complications           Mark Fernandes DPM

## 2025-03-28 ENCOUNTER — PATIENT ROUNDING (BHMG ONLY) (OUTPATIENT)
Age: 80
End: 2025-03-28
Payer: MEDICARE

## 2025-04-10 ENCOUNTER — OFFICE VISIT (OUTPATIENT)
Age: 80
End: 2025-04-10
Payer: MEDICARE

## 2025-04-10 VITALS — RESPIRATION RATE: 20 BRPM | WEIGHT: 230 LBS | HEIGHT: 70 IN | BODY MASS INDEX: 32.93 KG/M2

## 2025-04-10 DIAGNOSIS — L02.612 CELLULITIS AND ABSCESS OF TOE OF LEFT FOOT: ICD-10-CM

## 2025-04-10 DIAGNOSIS — L60.0 INGROWN TOENAIL: Primary | ICD-10-CM

## 2025-04-10 DIAGNOSIS — L03.032 CELLULITIS AND ABSCESS OF TOE OF LEFT FOOT: ICD-10-CM

## 2025-04-10 RX ORDER — FINASTERIDE 5 MG/1
TABLET, FILM COATED ORAL
COMMUNITY
Start: 2025-04-09

## 2025-04-11 RX ORDER — CEPHALEXIN 500 MG/1
500 CAPSULE ORAL 3 TIMES DAILY
Qty: 21 CAPSULE | Refills: 0 | Status: SHIPPED | OUTPATIENT
Start: 2025-04-11

## 2025-04-11 NOTE — PROGRESS NOTES
"04/10/2025  Foot and Ankle Surgery - Established Patient/Follow-up  Provider: Dr. Bertrand Fernandes DPM  Location: [facility]    Subjective:  Mango Ling is a 79 y.o. male.     Chief Complaint   Patient presents with    Left Foot - Follow-up, Ingrown Toenail     Off and on for 1 year    Follow-up     PCP: Deon Palacio MD  Last PCP Visit: 3/25/25       79 year old male 2 weeks s/p left hallux nail partial avulsion with phenol matrixectomy.  Patient has been dressing with triple antibiotic daily.  He is complaining of continued pain, redness and swelling.  He states there is continued clear drainage to surgical site.  He is concerned bout possible infection    Ingrown Toenail         No Known Allergies    Current Outpatient Medications on File Prior to Visit   Medication Sig Dispense Refill    donepezil (ARICEPT) 5 MG tablet TAKE 1 TABLET BY MOUTH TWICE DAILY 60 tablet 0    finasteride (PROSCAR) 5 MG tablet       fluticasone (FLONASE) 50 MCG/ACT nasal spray       Gemtesa 75 MG tablet       latanoprost (XALATAN) 0.005 % ophthalmic solution       losartan (COZAAR) 100 MG tablet       memantine (NAMENDA) 5 MG tablet One tab daily for one month then one tab bid 60 tablet 5    nitrofurantoin, macrocrystal-monohydrate, (Macrobid) 100 MG capsule Take 1 capsule by mouth 2 (Two) Times a Day. 14 capsule 0    pantoprazole (PROTONIX) 40 MG EC tablet       timolol (TIMOPTIC) 0.5 % ophthalmic solution       triamterene-hydrochlorothiazide (MAXZIDE-25) 37.5-25 MG per tablet       vitamin B-12 (CYANOCOBALAMIN) 1000 MCG tablet Take 1 tablet by mouth Daily.       No current facility-administered medications on file prior to visit.       Objective   Resp 20   Ht 177.8 cm (70\")   Wt 104 kg (230 lb)   BMI 33.00 kg/m²       Foot/Ankle Exam    GENERAL  Appearance:  appears stated age  Orientation:  AAOx3  Affect:  appropriate  Gait:  unimpaired  Assistance:  independent  Right shoe gear: casual shoe  Left shoe gear: casual " shoe    VASCULAR     Right Foot Vascularity   Normal vascular exam    Dorsalis pedis:  2+  Posterior tibial:  2+  Skin temperature:  warm  Edema grading:  None  CFT:  < 3 seconds  Pedal hair growth:  Present  Varicosities:  none     Left Foot Vascularity   Normal vascular exam    Dorsalis pedis:  2+  Posterior tibial:  2+  Skin temperature:  warm  Edema grading:  None  CFT:  < 3 seconds  Pedal hair growth:  Present  Varicosities:  none     NEUROLOGIC     Right Foot Neurologic   Normal sensation    Light touch sensation: normal  Vibratory sensation: normal  Hot/Cold sensation: normal  Normal reflexes    Achilles reflex:  2+  Babinski reflex:  2+     Left Foot Neurologic   Normal sensation    Light touch sensation: normal  Vibratory sensation: normal  Hot/Cold sensation:  normal  Normal reflexes    Achilles reflex:  2+  Babinski reflex:  2+    MUSCULOSKELETAL     Right Foot Musculoskeletal   Ecchymosis:  none  Tenderness:  none       Left Foot Musculoskeletal   Ecchymosis:  none  Tenderness:  toe 1 tenderness and toenail problem  Hammertoe:  Second toe, third toe, fourth toe and fifth toe    MUSCLE STRENGTH     Right Foot Muscle Strength   Normal strength    Foot dorsiflexion:  5  Foot plantar flexion:  5  Foot inversion:  5  Foot eversion:  5     Left Foot Muscle Strength   Normal strength    Foot dorsiflexion:  5  Foot plantar flexion:  5  Foot inversion:  5  Foot eversion:  5    RANGE OF MOTION     Right Foot Range of Motion   Foot and ankle ROM within normal limits       Left Foot Range of Motion   Foot and ankle ROM within normal limits      DERMATOLOGIC      Right Foot Dermatologic   Nails  1.  Normal.  2.  Normal.  3.  Normal.  4.  Normal.  5.  Normal.     Left Foot Dermatologic   Skin  Erythema, edema, tender with palpation to medial and lateral nail folds.  Serous drainage present  Nails  1.  Previous medial and lateral nail border excision with eschar in place  2.  Normal.  3.  Normal.  4.  Normal.  5.   Normal.          Assessment & Plan   Diagnoses and all orders for this visit:    1. Ingrown toenail (Primary)    2. Cellulitis and abscess of toe of left foot       Discussed with patient there progress with ingrown nail removal with phenol matricectomy.  Patient now with erythema, edema, pain and drainage to avulsion site.  Concern for cellulitis.      We will prescribe keflex 500mg TID for 7 days.  Patient to continue dressing locally with antibiotic ointment daily    Patient to monitor erythema progression.  Return precautions discussed with patient.      Patient to follow up in one week for cellulitis evaluation           Procedures     Mark Fernandes DPM

## 2025-04-16 ENCOUNTER — TELEPHONE (OUTPATIENT)
Dept: NEUROLOGY | Facility: CLINIC | Age: 80
End: 2025-04-16
Payer: MEDICARE

## 2025-04-16 DIAGNOSIS — R41.3 MEMORY LOSS: Primary | ICD-10-CM

## 2025-04-16 NOTE — TELEPHONE ENCOUNTER
Patient's wife called to r/s July appointment when you are out and asked if you could call in something for Mango to help him relax. He is very anxious and she would like help with that. Please call her back, Vesna at [phone]    Thank you!

## 2025-04-17 ENCOUNTER — OFFICE VISIT (OUTPATIENT)
Age: 80
End: 2025-04-17
Payer: MEDICARE

## 2025-04-17 VITALS — BODY MASS INDEX: 32.93 KG/M2 | WEIGHT: 230 LBS | RESPIRATION RATE: 20 BRPM | HEIGHT: 70 IN

## 2025-04-17 DIAGNOSIS — L03.032 CELLULITIS AND ABSCESS OF TOE OF LEFT FOOT: Primary | ICD-10-CM

## 2025-04-17 DIAGNOSIS — L02.612 CELLULITIS AND ABSCESS OF TOE OF LEFT FOOT: Primary | ICD-10-CM

## 2025-04-17 RX ORDER — ESCITALOPRAM OXALATE 10 MG/1
10 TABLET ORAL DAILY
Qty: 30 TABLET | Refills: 2 | Status: SHIPPED | OUTPATIENT
Start: 2025-04-17 | End: 2026-04-17

## 2025-04-17 NOTE — TELEPHONE ENCOUNTER
SSRI antidepressants are recommended for anxiety with dementia  I sent rx for lexapro 10 mg daily to [facility]

## 2025-04-17 NOTE — PROGRESS NOTES
04/17/2025  Foot and Ankle Surgery - Established Patient/Follow-up  Provider: Dr. Bertrand Fernandes DPM  Location: [facility]    Subjective:  Mango Ling is a 79 y.o. male.     Chief Complaint   Patient presents with    Left Foot - Follow-up, Ingrown Toenail     Off and on for 1 year    Follow-up     PCP: Deon Palacio MD  Last PCP Visit: 3/25/25       79-year-old male following up for left hallux cellulitis status post bilateral border nail avulsion with phenol matricectomy.  Patient has finished oral course of cephalexin antibiotic.  Patient states decreased redness and swelling.  Patient still has tenderness to the medial border with shoe gear.  No additional complaints today.  Patient has stopped dressing with antibiotic ointment or bandage couple days ago.    Ingrown Toenail         No Known Allergies    Current Outpatient Medications on File Prior to Visit   Medication Sig Dispense Refill    cephalexin (KEFLEX) 500 MG capsule Take 1 capsule by mouth 3 (Three) Times a Day. 21 capsule 0    donepezil (ARICEPT) 5 MG tablet TAKE 1 TABLET BY MOUTH TWICE DAILY 60 tablet 0    escitalopram (Lexapro) 10 MG tablet Take 1 tablet by mouth Daily. 30 tablet 2    finasteride (PROSCAR) 5 MG tablet       fluticasone (FLONASE) 50 MCG/ACT nasal spray       Gemtesa 75 MG tablet       latanoprost (XALATAN) 0.005 % ophthalmic solution       losartan (COZAAR) 100 MG tablet       memantine (NAMENDA) 5 MG tablet One tab daily for one month then one tab bid 60 tablet 5    nitrofurantoin, macrocrystal-monohydrate, (Macrobid) 100 MG capsule Take 1 capsule by mouth 2 (Two) Times a Day. 14 capsule 0    pantoprazole (PROTONIX) 40 MG EC tablet       timolol (TIMOPTIC) 0.5 % ophthalmic solution       triamterene-hydrochlorothiazide (MAXZIDE-25) 37.5-25 MG per tablet       vitamin B-12 (CYANOCOBALAMIN) 1000 MCG tablet Take 1 tablet by mouth Daily.       No current facility-administered medications on file prior to visit.  "      Objective   Resp 20   Ht 177.8 cm (70\")   Wt 104 kg (230 lb)   BMI 33.00 kg/m²     Foot/Ankle Exam    GENERAL  Appearance:  appears stated age  Orientation:  AAOx3  Affect:  appropriate  Gait:  unimpaired  Assistance:  independent  Right shoe gear: casual shoe  Left shoe gear: casual shoe    VASCULAR     Right Foot Vascularity   Normal vascular exam    Dorsalis pedis:  2+  Posterior tibial:  2+  Skin temperature:  warm  Edema grading:  None  CFT:  < 3 seconds  Pedal hair growth:  Present  Varicosities:  none     Left Foot Vascularity   Normal vascular exam    Dorsalis pedis:  2+  Posterior tibial:  2+  Skin temperature:  warm  Edema grading:  None  CFT:  < 3 seconds  Pedal hair growth:  Present  Varicosities:  none     NEUROLOGIC     Right Foot Neurologic   Normal sensation    Light touch sensation: normal  Vibratory sensation: normal  Hot/Cold sensation: normal  Normal reflexes    Achilles reflex:  2+  Babinski reflex:  2+     Left Foot Neurologic   Normal sensation    Light touch sensation: normal  Vibratory sensation: normal  Hot/Cold sensation:  normal  Normal reflexes    Achilles reflex:  2+  Babinski reflex:  2+    MUSCULOSKELETAL     Right Foot Musculoskeletal   Ecchymosis:  none  Tenderness:  none       Left Foot Musculoskeletal   Ecchymosis:  none  Tenderness:  toe 1 tenderness and toenail problem    MUSCLE STRENGTH     Right Foot Muscle Strength   Normal strength    Foot dorsiflexion:  5  Foot plantar flexion:  5  Foot inversion:  5  Foot eversion:  5     Left Foot Muscle Strength   Normal strength    Foot dorsiflexion:  5  Foot plantar flexion:  5  Foot inversion:  5  Foot eversion:  5    RANGE OF MOTION     Right Foot Range of Motion   Foot and ankle ROM within normal limits       Left Foot Range of Motion   Foot and ankle ROM within normal limits      DERMATOLOGIC      Right Foot Dermatologic   Nails  1.  Normal.  2.  Normal.  3.  Normal.  4.  Normal.  5.  Normal.     Left Foot Dermatologic "   Skin  Positive for erythema.   Nails  1.  Normal.  2.  Normal.  3.  Normal.  4.  Normal.  5.  Normal.          Assessment & Plan   Diagnoses and all orders for this visit:    1. Cellulitis and abscess of toe of left foot (Primary)       Discussed with patient there progress with ingrown nail removal with phenol matricectomy.  Patient is doing well at this time with no additional complaints    Finished up current antibiotic medication treatment.  He has 2 days left of current prescription    Discussed with wife that patient needs to discontinue picking or handling treatment sites.  This can lead to further irritation or infection.    Patient will discontinue Epsom salt soaks.  Patient dressed with dry bandage if needed.    Patient will continue monitoring for signs of infection.    Patient will follow-up as needed          Procedures     Mark Fernandes DPM

## 2025-06-20 ENCOUNTER — APPOINTMENT (OUTPATIENT)
Dept: CT IMAGING | Facility: HOSPITAL | Age: 80
End: 2025-06-20
Payer: MEDICARE

## 2025-06-20 ENCOUNTER — HOSPITAL ENCOUNTER (INPATIENT)
Facility: HOSPITAL | Age: 80
LOS: 3 days | Discharge: HOME | End: 2025-06-23
Attending: EMERGENCY MEDICINE | Admitting: INTERNAL MEDICINE
Payer: MEDICARE

## 2025-06-20 ENCOUNTER — APPOINTMENT (OUTPATIENT)
Dept: CARDIOLOGY | Facility: HOSPITAL | Age: 80
End: 2025-06-20
Payer: MEDICARE

## 2025-06-20 ENCOUNTER — APPOINTMENT (OUTPATIENT)
Dept: GENERAL RADIOLOGY | Facility: HOSPITAL | Age: 80
End: 2025-06-20
Payer: MEDICARE

## 2025-06-20 DIAGNOSIS — M54.17 LUMBOSACRAL RADICULOPATHY: ICD-10-CM

## 2025-06-20 DIAGNOSIS — R07.9 CHEST PAIN, UNSPECIFIED TYPE: ICD-10-CM

## 2025-06-20 DIAGNOSIS — I26.02 ACUTE SADDLE PULMONARY EMBOLISM WITH ACUTE COR PULMONALE: Primary | ICD-10-CM

## 2025-06-20 DIAGNOSIS — R06.00 DYSPNEA, UNSPECIFIED TYPE: ICD-10-CM

## 2025-06-20 LAB
ACT BLD: 210 SECONDS (ref 89–137)
ALBUMIN SERPL-MCNC: 4.3 G/DL (ref 3.5–5.2)
ALBUMIN/GLOB SERPL: 1.4 G/DL
ALP SERPL-CCNC: 90 U/L (ref 39–117)
ALT SERPL W P-5'-P-CCNC: 30 U/L (ref 1–41)
ANION GAP SERPL CALCULATED.3IONS-SCNC: 11.8 MMOL/L (ref 5–15)
AORTIC DIMENSIONLESS INDEX: 0.61 (DI)
APTT PPP: 29.9 SECONDS (ref 22.7–35.4)
AST SERPL-CCNC: 30 U/L (ref 1–40)
AV MEAN PRESS GRAD SYS DOP V1V2: 4 MMHG
AV VMAX SYS DOP: 144 CM/SEC
BACTERIA UR QL AUTO: ABNORMAL /HPF
BASOPHILS # BLD AUTO: 0.05 10*3/MM3 (ref 0–0.2)
BASOPHILS NFR BLD AUTO: 0.5 % (ref 0–1.5)
BH CV ECHO MEAS - ACS: 2 CM
BH CV ECHO MEAS - AO MAX PG: 8.3 MMHG
BH CV ECHO MEAS - AO V2 VTI: 29.9 CM
BH CV ECHO MEAS - AVA(I,D): 2.8 CM2
BH CV ECHO MEAS - EDV(CUBED): 125 ML
BH CV ECHO MEAS - EDV(MOD-SP4): 176 ML
BH CV ECHO MEAS - EF(MOD-SP4): 58.3 %
BH CV ECHO MEAS - ESV(CUBED): 68.9 ML
BH CV ECHO MEAS - ESV(MOD-SP4): 73.4 ML
BH CV ECHO MEAS - FS: 18 %
BH CV ECHO MEAS - IVS/LVPW: 1.3 CM
BH CV ECHO MEAS - IVSD: 1.3 CM
BH CV ECHO MEAS - LA DIMENSION: 2.8 CM
BH CV ECHO MEAS - LAT PEAK E' VEL: 7.5 CM/SEC
BH CV ECHO MEAS - LV DIASTOLIC VOL/BSA (35-75): 76.8 CM2
BH CV ECHO MEAS - LV MASS(C)D: 220.3 GRAMS
BH CV ECHO MEAS - LV MAX PG: 3.1 MMHG
BH CV ECHO MEAS - LV MEAN PG: 2 MMHG
BH CV ECHO MEAS - LV SYSTOLIC VOL/BSA (12-30): 32 CM2
BH CV ECHO MEAS - LV V1 MAX: 88.6 CM/SEC
BH CV ECHO MEAS - LV V1 VTI: 18.3 CM
BH CV ECHO MEAS - LVIDD: 5 CM
BH CV ECHO MEAS - LVIDS: 4.1 CM
BH CV ECHO MEAS - LVOT AREA: 4.5 CM2
BH CV ECHO MEAS - LVOT DIAM: 2.4 CM
BH CV ECHO MEAS - LVPWD: 1 CM
BH CV ECHO MEAS - MED PEAK E' VEL: 7.5 CM/SEC
BH CV ECHO MEAS - MR MAX PG: 29.4 MMHG
BH CV ECHO MEAS - MR MAX VEL: 271 CM/SEC
BH CV ECHO MEAS - MV A MAX VEL: 102 CM/SEC
BH CV ECHO MEAS - MV DEC SLOPE: 238 CM/SEC2
BH CV ECHO MEAS - MV DEC TIME: 0.29 SEC
BH CV ECHO MEAS - MV E MAX VEL: 71.8 CM/SEC
BH CV ECHO MEAS - MV E/A: 0.7
BH CV ECHO MEAS - MV MAX PG: 4.8 MMHG
BH CV ECHO MEAS - MV MEAN PG: 1 MMHG
BH CV ECHO MEAS - MV P1/2T: 101.8 MSEC
BH CV ECHO MEAS - MV V2 VTI: 25.7 CM
BH CV ECHO MEAS - MVA(P1/2T): 2.16 CM2
BH CV ECHO MEAS - MVA(VTI): 3.2 CM2
BH CV ECHO MEAS - PA ACC TIME: 0.07 SEC
BH CV ECHO MEAS - PA V2 MAX: 94.1 CM/SEC
BH CV ECHO MEAS - RV MAX PG: 1.18 MMHG
BH CV ECHO MEAS - RV V1 MAX: 54.3 CM/SEC
BH CV ECHO MEAS - RV V1 VTI: 13.3 CM
BH CV ECHO MEAS - SV(LVOT): 82.8 ML
BH CV ECHO MEAS - SV(MOD-SP4): 102.6 ML
BH CV ECHO MEAS - SVI(LVOT): 36.1 ML/M2
BH CV ECHO MEAS - SVI(MOD-SP4): 44.8 ML/M2
BH CV ECHO MEAS - TAPSE (>1.6): 2.6 CM
BH CV ECHO MEASUREMENTS AVERAGE E/E' RATIO: 9.57
BH CV LOW VAS LEFT DISTAL FEMORAL SPONT: 1
BH CV LOW VAS LEFT GREATER SAPH BK VESSEL: 1
BH CV LOW VAS LEFT LESSER SAPH VESSEL: 1
BH CV LOW VAS LEFT POPLITEAL SPONT: 1
BH CV LOW VAS RIGHT GREATER SAPH AK VESSEL: 1
BH CV LOW VAS RIGHT GREATER SAPH BK VESSEL: 1
BH CV LOWER VASCULAR LEFT COMMON FEMORAL AUGMENT: NORMAL
BH CV LOWER VASCULAR LEFT COMMON FEMORAL COMPETENT: NORMAL
BH CV LOWER VASCULAR LEFT COMMON FEMORAL COMPRESS: NORMAL
BH CV LOWER VASCULAR LEFT COMMON FEMORAL PHASIC: NORMAL
BH CV LOWER VASCULAR LEFT COMMON FEMORAL SPONT: NORMAL
BH CV LOWER VASCULAR LEFT DISTAL FEMORAL AUGMENT: NORMAL
BH CV LOWER VASCULAR LEFT DISTAL FEMORAL COMPETENT: NORMAL
BH CV LOWER VASCULAR LEFT DISTAL FEMORAL COMPRESS: NORMAL
BH CV LOWER VASCULAR LEFT DISTAL FEMORAL PHASIC: NORMAL
BH CV LOWER VASCULAR LEFT DISTAL FEMORAL SPONT: NORMAL
BH CV LOWER VASCULAR LEFT DISTAL FEMORAL THROMBUS: NORMAL
BH CV LOWER VASCULAR LEFT GASTRONEMIUS COMPRESS: NORMAL
BH CV LOWER VASCULAR LEFT GREATER SAPH AK COMPRESS: NORMAL
BH CV LOWER VASCULAR LEFT GREATER SAPH BK COMPRESS: NORMAL
BH CV LOWER VASCULAR LEFT GREATER SAPH BK THROMBUS: NORMAL
BH CV LOWER VASCULAR LEFT LESSER SAPH COMPRESS: NORMAL
BH CV LOWER VASCULAR LEFT LESSER SAPH THROMBUS: NORMAL
BH CV LOWER VASCULAR LEFT MID FEMORAL AUGMENT: NORMAL
BH CV LOWER VASCULAR LEFT MID FEMORAL COMPETENT: NORMAL
BH CV LOWER VASCULAR LEFT MID FEMORAL COMPRESS: NORMAL
BH CV LOWER VASCULAR LEFT MID FEMORAL PHASIC: NORMAL
BH CV LOWER VASCULAR LEFT MID FEMORAL SPONT: NORMAL
BH CV LOWER VASCULAR LEFT PERONEAL COMPRESS: NORMAL
BH CV LOWER VASCULAR LEFT POPLITEAL AUGMENT: NORMAL
BH CV LOWER VASCULAR LEFT POPLITEAL COMPETENT: NORMAL
BH CV LOWER VASCULAR LEFT POPLITEAL COMPRESS: NORMAL
BH CV LOWER VASCULAR LEFT POPLITEAL PHASIC: NORMAL
BH CV LOWER VASCULAR LEFT POPLITEAL SPONT: NORMAL
BH CV LOWER VASCULAR LEFT POPLITEAL THROMBUS: NORMAL
BH CV LOWER VASCULAR LEFT POSTERIOR TIBIAL COMPRESS: NORMAL
BH CV LOWER VASCULAR LEFT PROXIMAL FEMORAL COMPRESS: NORMAL
BH CV LOWER VASCULAR LEFT SAPHENOFEMORAL JUNCTION COMPRESS: NORMAL
BH CV LOWER VASCULAR RIGHT COMMON FEMORAL AUGMENT: NORMAL
BH CV LOWER VASCULAR RIGHT COMMON FEMORAL COMPETENT: NORMAL
BH CV LOWER VASCULAR RIGHT COMMON FEMORAL COMPRESS: NORMAL
BH CV LOWER VASCULAR RIGHT COMMON FEMORAL PHASIC: NORMAL
BH CV LOWER VASCULAR RIGHT COMMON FEMORAL SPONT: NORMAL
BH CV LOWER VASCULAR RIGHT DISTAL FEMORAL COMPRESS: NORMAL
BH CV LOWER VASCULAR RIGHT GASTRONEMIUS COMPRESS: NORMAL
BH CV LOWER VASCULAR RIGHT GREATER SAPH AK COMPRESS: NORMAL
BH CV LOWER VASCULAR RIGHT GREATER SAPH AK THROMBUS: NORMAL
BH CV LOWER VASCULAR RIGHT GREATER SAPH BK COMPRESS: NORMAL
BH CV LOWER VASCULAR RIGHT GREATER SAPH BK THROMBUS: NORMAL
BH CV LOWER VASCULAR RIGHT LESSER SAPH COMPRESS: NORMAL
BH CV LOWER VASCULAR RIGHT MID FEMORAL AUGMENT: NORMAL
BH CV LOWER VASCULAR RIGHT MID FEMORAL COMPETENT: NORMAL
BH CV LOWER VASCULAR RIGHT MID FEMORAL COMPRESS: NORMAL
BH CV LOWER VASCULAR RIGHT MID FEMORAL PHASIC: NORMAL
BH CV LOWER VASCULAR RIGHT MID FEMORAL SPONT: NORMAL
BH CV LOWER VASCULAR RIGHT PERONEAL COMPRESS: NORMAL
BH CV LOWER VASCULAR RIGHT POPLITEAL AUGMENT: NORMAL
BH CV LOWER VASCULAR RIGHT POPLITEAL COMPETENT: NORMAL
BH CV LOWER VASCULAR RIGHT POPLITEAL COMPRESS: NORMAL
BH CV LOWER VASCULAR RIGHT POPLITEAL PHASIC: NORMAL
BH CV LOWER VASCULAR RIGHT POPLITEAL SPONT: NORMAL
BH CV LOWER VASCULAR RIGHT POSTERIOR TIBIAL COMPRESS: NORMAL
BH CV LOWER VASCULAR RIGHT PROXIMAL FEMORAL COMPRESS: NORMAL
BH CV LOWER VASCULAR RIGHT SAPHENOFEMORAL JUNCTION COMPRESS: NORMAL
BH CV VAS PRELIMINARY FINDINGS SCRIPTING: 1
BH CV XLRA - TDI S': 13.7 CM/SEC
BILIRUB SERPL-MCNC: 1.1 MG/DL (ref 0–1.2)
BILIRUB UR QL STRIP: NEGATIVE
BUN SERPL-MCNC: 16 MG/DL (ref 8–23)
BUN/CREAT SERPL: 11.4 (ref 7–25)
CALCIUM SPEC-SCNC: 9.6 MG/DL (ref 8.6–10.5)
CHLORIDE SERPL-SCNC: 104 MMOL/L (ref 98–107)
CLARITY UR: CLEAR
CO2 SERPL-SCNC: 24.2 MMOL/L (ref 22–29)
COLOR UR: ABNORMAL
CREAT SERPL-MCNC: 1.4 MG/DL (ref 0.76–1.27)
D DIMER PPP FEU-MCNC: 4.45 MCGFEU/ML (ref 0–0.79)
DEPRECATED RDW RBC AUTO: 44.3 FL (ref 37–54)
DEPRECATED RDW RBC AUTO: 44.8 FL (ref 37–54)
EGFRCR SERPLBLD CKD-EPI 2021: 51.1 ML/MIN/1.73
EOSINOPHIL # BLD AUTO: 0.11 10*3/MM3 (ref 0–0.4)
EOSINOPHIL NFR BLD AUTO: 1 % (ref 0.3–6.2)
ERYTHROCYTE [DISTWIDTH] IN BLOOD BY AUTOMATED COUNT: 13 % (ref 12.3–15.4)
ERYTHROCYTE [DISTWIDTH] IN BLOOD BY AUTOMATED COUNT: 13.1 % (ref 12.3–15.4)
GEN 5 1HR TROPONIN T REFLEX: 23 NG/L
GLOBULIN UR ELPH-MCNC: 3 GM/DL
GLUCOSE SERPL-MCNC: 111 MG/DL (ref 65–99)
GLUCOSE UR STRIP-MCNC: NEGATIVE MG/DL
HCT VFR BLD AUTO: 36.6 % (ref 37.5–51)
HCT VFR BLD AUTO: 42.3 % (ref 37.5–51)
HGB BLD-MCNC: 12 G/DL (ref 13–17.7)
HGB BLD-MCNC: 13.7 G/DL (ref 13–17.7)
HGB UR QL STRIP.AUTO: NEGATIVE
HOLD SPECIMEN: NORMAL
HYALINE CASTS UR QL AUTO: ABNORMAL /LPF
IMM GRANULOCYTES # BLD AUTO: 0.05 10*3/MM3 (ref 0–0.05)
IMM GRANULOCYTES NFR BLD AUTO: 0.5 % (ref 0–0.5)
INR PPP: 1.11 (ref 0.9–1.1)
KETONES UR QL STRIP: NEGATIVE
LEUKOCYTE ESTERASE UR QL STRIP.AUTO: ABNORMAL
LIPASE SERPL-CCNC: 58 U/L (ref 13–60)
LV EF BIPLANE MOD: 58 %
LYMPHOCYTES # BLD AUTO: 2.54 10*3/MM3 (ref 0.7–3.1)
LYMPHOCYTES NFR BLD AUTO: 23.4 % (ref 19.6–45.3)
MCH RBC QN AUTO: 30.4 PG (ref 26.6–33)
MCH RBC QN AUTO: 30.8 PG (ref 26.6–33)
MCHC RBC AUTO-ENTMCNC: 32.4 G/DL (ref 31.5–35.7)
MCHC RBC AUTO-ENTMCNC: 32.8 G/DL (ref 31.5–35.7)
MCV RBC AUTO: 94 FL (ref 79–97)
MCV RBC AUTO: 94.1 FL (ref 79–97)
MONOCYTES # BLD AUTO: 0.92 10*3/MM3 (ref 0.1–0.9)
MONOCYTES NFR BLD AUTO: 8.5 % (ref 5–12)
NEUTROPHILS NFR BLD AUTO: 66.1 % (ref 42.7–76)
NEUTROPHILS NFR BLD AUTO: 7.18 10*3/MM3 (ref 1.7–7)
NITRITE UR QL STRIP: NEGATIVE
NRBC BLD AUTO-RTO: 0 /100 WBC (ref 0–0.2)
NT-PROBNP SERPL-MCNC: 135 PG/ML (ref 0–1800)
PH UR STRIP.AUTO: 5.5 [PH] (ref 5–8)
PLATELET # BLD AUTO: 197 10*3/MM3 (ref 140–450)
PLATELET # BLD AUTO: 243 10*3/MM3 (ref 140–450)
PMV BLD AUTO: 8.6 FL (ref 6–12)
PMV BLD AUTO: 8.8 FL (ref 6–12)
POTASSIUM SERPL-SCNC: 3.9 MMOL/L (ref 3.5–5.2)
PROT SERPL-MCNC: 7.3 G/DL (ref 6–8.5)
PROT UR QL STRIP: NEGATIVE
PROTHROMBIN TIME: 14.3 SECONDS (ref 11.7–14.2)
RBC # BLD AUTO: 3.89 10*6/MM3 (ref 4.14–5.8)
RBC # BLD AUTO: 4.5 10*6/MM3 (ref 4.14–5.8)
RBC # UR STRIP: ABNORMAL /HPF
REF LAB TEST METHOD: ABNORMAL
SINUS: 3.6 CM
SODIUM SERPL-SCNC: 140 MMOL/L (ref 136–145)
SP GR UR STRIP: 1.06 (ref 1–1.03)
SQUAMOUS #/AREA URNS HPF: ABNORMAL /HPF
STJ: 2.9 CM
TROPONIN T % DELTA: -8
TROPONIN T NUMERIC DELTA: -2 NG/L
TROPONIN T SERPL HS-MCNC: 25 NG/L
UFH PPP CHRO-ACNC: 0.4 IU/ML
UFH PPP CHRO-ACNC: <0.1 IU/ML
UROBILINOGEN UR QL STRIP: ABNORMAL
WBC # UR STRIP: ABNORMAL /HPF
WBC NRBC COR # BLD AUTO: 10.85 10*3/MM3 (ref 3.4–10.8)
WBC NRBC COR # BLD AUTO: 10.89 10*3/MM3 (ref 3.4–10.8)

## 2025-06-20 PROCEDURE — C1769 GUIDE WIRE: HCPCS | Performed by: INTERNAL MEDICINE

## 2025-06-20 PROCEDURE — 85347 COAGULATION TIME ACTIVATED: CPT

## 2025-06-20 PROCEDURE — 25510000001 IOPAMIDOL PER 1 ML: Performed by: INTERNAL MEDICINE

## 2025-06-20 PROCEDURE — 36015 PLACE CATHETER IN ARTERY: CPT | Performed by: INTERNAL MEDICINE

## 2025-06-20 PROCEDURE — C1757 CATH, THROMBECTOMY/EMBOLECT: HCPCS | Performed by: INTERNAL MEDICINE

## 2025-06-20 PROCEDURE — 80053 COMPREHEN METABOLIC PANEL: CPT

## 2025-06-20 PROCEDURE — 85520 HEPARIN ASSAY: CPT | Performed by: INTERNAL MEDICINE

## 2025-06-20 PROCEDURE — 25010000002 HEPARIN (PORCINE) 25000-0.45 UT/250ML-% SOLUTION

## 2025-06-20 PROCEDURE — 25010000002 LIDOCAINE 1 % SOLUTION: Performed by: INTERNAL MEDICINE

## 2025-06-20 PROCEDURE — 4A023N6 MEASUREMENT OF CARDIAC SAMPLING AND PRESSURE, RIGHT HEART, PERCUTANEOUS APPROACH: ICD-10-PCS | Performed by: INTERNAL MEDICINE

## 2025-06-20 PROCEDURE — 87186 SC STD MICRODIL/AGAR DIL: CPT

## 2025-06-20 PROCEDURE — 37184 PRIM ART M-THRMBC 1ST VSL: CPT | Performed by: INTERNAL MEDICINE

## 2025-06-20 PROCEDURE — 85025 COMPLETE CBC W/AUTO DIFF WBC: CPT

## 2025-06-20 PROCEDURE — 71045 X-RAY EXAM CHEST 1 VIEW: CPT

## 2025-06-20 PROCEDURE — 25010000002 FENTANYL CITRATE (PF) 100 MCG/2ML SOLUTION: Performed by: INTERNAL MEDICINE

## 2025-06-20 PROCEDURE — 93306 TTE W/DOPPLER COMPLETE: CPT | Performed by: INTERNAL MEDICINE

## 2025-06-20 PROCEDURE — 25010000002 SULFUR HEXAFLUORIDE MICROSPH 60.7-25 MG RECONSTITUTED SUSPENSION: Performed by: INTERNAL MEDICINE

## 2025-06-20 PROCEDURE — 25010000002 HEPARIN (PORCINE) PER 1000 UNITS: Performed by: INTERNAL MEDICINE

## 2025-06-20 PROCEDURE — 02CR3ZZ EXTIRPATION OF MATTER FROM LEFT PULMONARY ARTERY, PERCUTANEOUS APPROACH: ICD-10-PCS | Performed by: INTERNAL MEDICINE

## 2025-06-20 PROCEDURE — 84484 ASSAY OF TROPONIN QUANT: CPT

## 2025-06-20 PROCEDURE — 83880 ASSAY OF NATRIURETIC PEPTIDE: CPT

## 2025-06-20 PROCEDURE — 81001 URINALYSIS AUTO W/SCOPE: CPT

## 2025-06-20 PROCEDURE — 85379 FIBRIN DEGRADATION QUANT: CPT

## 2025-06-20 PROCEDURE — 25010000002 KETOROLAC TROMETHAMINE PER 15 MG

## 2025-06-20 PROCEDURE — 85610 PROTHROMBIN TIME: CPT

## 2025-06-20 PROCEDURE — 02CQ3ZZ EXTIRPATION OF MATTER FROM RIGHT PULMONARY ARTERY, PERCUTANEOUS APPROACH: ICD-10-PCS | Performed by: INTERNAL MEDICINE

## 2025-06-20 PROCEDURE — C1894 INTRO/SHEATH, NON-LASER: HCPCS | Performed by: INTERNAL MEDICINE

## 2025-06-20 PROCEDURE — 25510000001 IOPAMIDOL PER 1 ML: Performed by: EMERGENCY MEDICINE

## 2025-06-20 PROCEDURE — 99152 MOD SED SAME PHYS/QHP 5/>YRS: CPT | Performed by: INTERNAL MEDICINE

## 2025-06-20 PROCEDURE — 99153 MOD SED SAME PHYS/QHP EA: CPT | Performed by: INTERNAL MEDICINE

## 2025-06-20 PROCEDURE — 93970 EXTREMITY STUDY: CPT | Performed by: SURGERY

## 2025-06-20 PROCEDURE — 25010000002 HYDROMORPHONE PER 4 MG: Performed by: NURSE PRACTITIONER

## 2025-06-20 PROCEDURE — 87086 URINE CULTURE/COLONY COUNT: CPT

## 2025-06-20 PROCEDURE — 85027 COMPLETE CBC AUTOMATED: CPT | Performed by: NURSE PRACTITIONER

## 2025-06-20 PROCEDURE — 93970 EXTREMITY STUDY: CPT

## 2025-06-20 PROCEDURE — 93005 ELECTROCARDIOGRAM TRACING: CPT | Performed by: EMERGENCY MEDICINE

## 2025-06-20 PROCEDURE — 36415 COLL VENOUS BLD VENIPUNCTURE: CPT

## 2025-06-20 PROCEDURE — 37185 PRIM ART M-THRMBC SBSQ VSL: CPT | Performed by: INTERNAL MEDICINE

## 2025-06-20 PROCEDURE — 83690 ASSAY OF LIPASE: CPT

## 2025-06-20 PROCEDURE — 71275 CT ANGIOGRAPHY CHEST: CPT

## 2025-06-20 PROCEDURE — 99291 CRITICAL CARE FIRST HOUR: CPT

## 2025-06-20 PROCEDURE — 25810000003 SODIUM CHLORIDE 0.9 % SOLUTION: Performed by: INTERNAL MEDICINE

## 2025-06-20 PROCEDURE — 87077 CULTURE AEROBIC IDENTIFY: CPT

## 2025-06-20 PROCEDURE — 85520 HEPARIN ASSAY: CPT

## 2025-06-20 PROCEDURE — C1894 INTRO/SHEATH, NON-LASER: HCPCS

## 2025-06-20 PROCEDURE — 85730 THROMBOPLASTIN TIME PARTIAL: CPT

## 2025-06-20 PROCEDURE — 99223 1ST HOSP IP/OBS HIGH 75: CPT | Performed by: INTERNAL MEDICINE

## 2025-06-20 PROCEDURE — 25010000002 MIDAZOLAM PER 1 MG: Performed by: INTERNAL MEDICINE

## 2025-06-20 PROCEDURE — 93306 TTE W/DOPPLER COMPLETE: CPT

## 2025-06-20 RX ORDER — MIDAZOLAM HYDROCHLORIDE 1 MG/ML
INJECTION, SOLUTION INTRAMUSCULAR; INTRAVENOUS
Status: DISCONTINUED | OUTPATIENT
Start: 2025-06-20 | End: 2025-06-20 | Stop reason: HOSPADM

## 2025-06-20 RX ORDER — BISACODYL 10 MG
10 SUPPOSITORY, RECTAL RECTAL DAILY PRN
Status: DISCONTINUED | OUTPATIENT
Start: 2025-06-20 | End: 2025-06-23 | Stop reason: HOSPADM

## 2025-06-20 RX ORDER — IOPAMIDOL 755 MG/ML
INJECTION, SOLUTION INTRAVASCULAR
Status: DISCONTINUED | OUTPATIENT
Start: 2025-06-20 | End: 2025-06-20 | Stop reason: HOSPADM

## 2025-06-20 RX ORDER — LOSARTAN POTASSIUM 50 MG/1
100 TABLET ORAL
Status: DISCONTINUED | OUTPATIENT
Start: 2025-06-20 | End: 2025-06-23 | Stop reason: HOSPADM

## 2025-06-20 RX ORDER — ALUMINA, MAGNESIA, AND SIMETHICONE 2400; 2400; 240 MG/30ML; MG/30ML; MG/30ML
15 SUSPENSION ORAL EVERY 6 HOURS PRN
Status: DISCONTINUED | OUTPATIENT
Start: 2025-06-20 | End: 2025-06-23 | Stop reason: HOSPADM

## 2025-06-20 RX ORDER — SODIUM CHLORIDE 9 MG/ML
100 INJECTION, SOLUTION INTRAVENOUS CONTINUOUS
Status: CANCELLED | OUTPATIENT
Start: 2025-06-21 | End: 2025-06-21

## 2025-06-20 RX ORDER — ONDANSETRON 4 MG/1
4 TABLET, ORALLY DISINTEGRATING ORAL EVERY 6 HOURS PRN
Status: DISCONTINUED | OUTPATIENT
Start: 2025-06-20 | End: 2025-06-23 | Stop reason: HOSPADM

## 2025-06-20 RX ORDER — KETOROLAC TROMETHAMINE 30 MG/ML
15 INJECTION, SOLUTION INTRAMUSCULAR; INTRAVENOUS ONCE
Status: COMPLETED | OUTPATIENT
Start: 2025-06-20 | End: 2025-06-20

## 2025-06-20 RX ORDER — NITROGLYCERIN 0.4 MG/1
0.4 TABLET SUBLINGUAL
Status: DISCONTINUED | OUTPATIENT
Start: 2025-06-20 | End: 2025-06-23 | Stop reason: HOSPADM

## 2025-06-20 RX ORDER — HEPARIN SODIUM 10000 [USP'U]/100ML
13.4 INJECTION, SOLUTION INTRAVENOUS
Status: DISCONTINUED | OUTPATIENT
Start: 2025-06-20 | End: 2025-06-22

## 2025-06-20 RX ORDER — ACETAMINOPHEN 325 MG/1
650 TABLET ORAL EVERY 4 HOURS PRN
Status: DISCONTINUED | OUTPATIENT
Start: 2025-06-20 | End: 2025-06-20

## 2025-06-20 RX ORDER — IOPAMIDOL 755 MG/ML
100 INJECTION, SOLUTION INTRAVASCULAR
Status: COMPLETED | OUTPATIENT
Start: 2025-06-20 | End: 2025-06-20

## 2025-06-20 RX ORDER — SODIUM CHLORIDE 0.9 % (FLUSH) 0.9 %
10 SYRINGE (ML) INJECTION AS NEEDED
Status: DISCONTINUED | OUTPATIENT
Start: 2025-06-20 | End: 2025-06-23 | Stop reason: HOSPADM

## 2025-06-20 RX ORDER — BISACODYL 5 MG/1
5 TABLET, DELAYED RELEASE ORAL DAILY PRN
Status: DISCONTINUED | OUTPATIENT
Start: 2025-06-20 | End: 2025-06-23 | Stop reason: HOSPADM

## 2025-06-20 RX ORDER — FINASTERIDE 5 MG/1
5 TABLET, FILM COATED ORAL DAILY
Status: DISCONTINUED | OUTPATIENT
Start: 2025-06-20 | End: 2025-06-23 | Stop reason: HOSPADM

## 2025-06-20 RX ORDER — ACETAMINOPHEN 325 MG/1
650 TABLET ORAL EVERY 4 HOURS PRN
Status: DISCONTINUED | OUTPATIENT
Start: 2025-06-20 | End: 2025-06-23 | Stop reason: HOSPADM

## 2025-06-20 RX ORDER — DONEPEZIL HYDROCHLORIDE 5 MG/1
5 TABLET, FILM COATED ORAL 2 TIMES DAILY
Status: DISCONTINUED | OUTPATIENT
Start: 2025-06-20 | End: 2025-06-23 | Stop reason: HOSPADM

## 2025-06-20 RX ORDER — FENTANYL CITRATE 50 UG/ML
INJECTION, SOLUTION INTRAMUSCULAR; INTRAVENOUS
Status: DISCONTINUED | OUTPATIENT
Start: 2025-06-20 | End: 2025-06-20 | Stop reason: HOSPADM

## 2025-06-20 RX ORDER — ONDANSETRON 2 MG/ML
4 INJECTION INTRAMUSCULAR; INTRAVENOUS EVERY 6 HOURS PRN
Status: DISCONTINUED | OUTPATIENT
Start: 2025-06-20 | End: 2025-06-23 | Stop reason: HOSPADM

## 2025-06-20 RX ORDER — AMOXICILLIN 250 MG
2 CAPSULE ORAL 2 TIMES DAILY PRN
Status: DISCONTINUED | OUTPATIENT
Start: 2025-06-20 | End: 2025-06-23 | Stop reason: HOSPADM

## 2025-06-20 RX ORDER — SODIUM CHLORIDE 9 MG/ML
50 INJECTION, SOLUTION INTRAVENOUS CONTINUOUS
Status: DISCONTINUED | OUTPATIENT
Start: 2025-06-20 | End: 2025-06-23 | Stop reason: HOSPADM

## 2025-06-20 RX ORDER — SODIUM CHLORIDE 9 MG/ML
40 INJECTION, SOLUTION INTRAVENOUS AS NEEDED
Status: DISCONTINUED | OUTPATIENT
Start: 2025-06-20 | End: 2025-06-23 | Stop reason: HOSPADM

## 2025-06-20 RX ORDER — LIDOCAINE HYDROCHLORIDE 10 MG/ML
INJECTION, SOLUTION INFILTRATION; PERINEURAL
Status: DISCONTINUED | OUTPATIENT
Start: 2025-06-20 | End: 2025-06-20 | Stop reason: HOSPADM

## 2025-06-20 RX ORDER — POLYETHYLENE GLYCOL 3350 17 G/17G
17 POWDER, FOR SOLUTION ORAL DAILY PRN
Status: DISCONTINUED | OUTPATIENT
Start: 2025-06-20 | End: 2025-06-23 | Stop reason: HOSPADM

## 2025-06-20 RX ORDER — PANTOPRAZOLE SODIUM 40 MG/1
40 TABLET, DELAYED RELEASE ORAL
Status: DISCONTINUED | OUTPATIENT
Start: 2025-06-21 | End: 2025-06-23 | Stop reason: HOSPADM

## 2025-06-20 RX ORDER — IPRATROPIUM BROMIDE AND ALBUTEROL SULFATE 2.5; .5 MG/3ML; MG/3ML
3 SOLUTION RESPIRATORY (INHALATION) EVERY 6 HOURS PRN
Status: DISCONTINUED | OUTPATIENT
Start: 2025-06-20 | End: 2025-06-23 | Stop reason: HOSPADM

## 2025-06-20 RX ORDER — HEPARIN SODIUM 10000 [USP'U]/100ML
13.4 INJECTION, SOLUTION INTRAVENOUS
Status: DISCONTINUED | OUTPATIENT
Start: 2025-06-20 | End: 2025-06-20

## 2025-06-20 RX ORDER — SODIUM CHLORIDE 0.9 % (FLUSH) 0.9 %
10 SYRINGE (ML) INJECTION EVERY 12 HOURS SCHEDULED
Status: DISCONTINUED | OUTPATIENT
Start: 2025-06-20 | End: 2025-06-23 | Stop reason: HOSPADM

## 2025-06-20 RX ORDER — NITROGLYCERIN 0.4 MG/1
0.4 TABLET SUBLINGUAL
Status: DISCONTINUED | OUTPATIENT
Start: 2025-06-20 | End: 2025-06-20

## 2025-06-20 RX ORDER — HEPARIN SODIUM 1000 [USP'U]/ML
INJECTION, SOLUTION INTRAVENOUS; SUBCUTANEOUS
Status: DISCONTINUED | OUTPATIENT
Start: 2025-06-20 | End: 2025-06-20 | Stop reason: HOSPADM

## 2025-06-20 RX ORDER — MEMANTINE HYDROCHLORIDE 10 MG/1
5 TABLET ORAL EVERY 12 HOURS SCHEDULED
Status: DISCONTINUED | OUTPATIENT
Start: 2025-06-20 | End: 2025-06-23 | Stop reason: HOSPADM

## 2025-06-20 RX ORDER — ESCITALOPRAM OXALATE 10 MG/1
10 TABLET ORAL DAILY
Status: DISCONTINUED | OUTPATIENT
Start: 2025-06-20 | End: 2025-06-23 | Stop reason: HOSPADM

## 2025-06-20 RX ORDER — HYDROMORPHONE HYDROCHLORIDE 1 MG/ML
0.5 INJECTION, SOLUTION INTRAMUSCULAR; INTRAVENOUS; SUBCUTANEOUS
Refills: 0 | Status: DISCONTINUED | OUTPATIENT
Start: 2025-06-20 | End: 2025-06-23 | Stop reason: HOSPADM

## 2025-06-20 RX ORDER — ACETAMINOPHEN 650 MG/1
650 SUPPOSITORY RECTAL EVERY 4 HOURS PRN
Status: DISCONTINUED | OUTPATIENT
Start: 2025-06-20 | End: 2025-06-23 | Stop reason: HOSPADM

## 2025-06-20 RX ADMIN — SODIUM CHLORIDE 50 ML/HR: 9 INJECTION, SOLUTION INTRAVENOUS at 17:06

## 2025-06-20 RX ADMIN — DONEPEZIL HYDROCHLORIDE 5 MG: 5 TABLET, FILM COATED ORAL at 21:39

## 2025-06-20 RX ADMIN — ESCITALOPRAM 10 MG: 10 TABLET, FILM COATED ORAL at 20:10

## 2025-06-20 RX ADMIN — MUPIROCIN 1 APPLICATION: 20 OINTMENT TOPICAL at 17:07

## 2025-06-20 RX ADMIN — MEMANTINE HYDROCHLORIDE 5 MG: 10 TABLET, FILM COATED ORAL at 21:39

## 2025-06-20 RX ADMIN — HEPARIN SODIUM 13.4 UNITS/KG/HR: 10000 INJECTION, SOLUTION INTRAVENOUS at 12:12

## 2025-06-20 RX ADMIN — ACETAMINOPHEN 650 MG: 325 TABLET, FILM COATED ORAL at 20:10

## 2025-06-20 RX ADMIN — FINASTERIDE 5 MG: 5 TABLET, FILM COATED ORAL at 20:10

## 2025-06-20 RX ADMIN — SULFUR HEXAFLUORIDE 2 ML: KIT at 13:44

## 2025-06-20 RX ADMIN — Medication 10 ML: at 10:19

## 2025-06-20 RX ADMIN — Medication 10 ML: at 21:39

## 2025-06-20 RX ADMIN — KETOROLAC TROMETHAMINE 15 MG: 30 INJECTION INTRAMUSCULAR; INTRAVENOUS at 10:40

## 2025-06-20 RX ADMIN — IOPAMIDOL 100 ML: 755 INJECTION, SOLUTION INTRAVENOUS at 11:22

## 2025-06-20 RX ADMIN — HYDROMORPHONE HYDROCHLORIDE 0.5 MG: 1 INJECTION, SOLUTION INTRAMUSCULAR; INTRAVENOUS; SUBCUTANEOUS at 21:39

## 2025-06-20 NOTE — PROGRESS NOTES
Transitions of Care (test claim):    Drug Eliquis:   Covered/PA Required: Covered without PA  Copay Per Month: $114.04  Is the medication eligible for a trial card/copay card? Free trial available from       Drug Xarelto:   Covered/PA Required: Covered without PA  Copay Per Month: $112.50  Is the medication eligible for a trial card/copay card? Free trial available from       Drug Lovenox:   Covered/PA Required: Covered without PA  Copay Per Month: $37.32  Is the medication eligible for a trial card/copay card? N/A      Please note that when it states medication is eligible for a trial card that this only means that the medication has a free trial available. This does not assess if the patient has already utilized the once in a lifetime free trial.     This test claim coverage is only valid on the date the note is published. Copay/coverage could vary depending on patient coverage at a later date.  Additionally this test claim is for the sole purpose of a price check not a clinical recommendation.     For billing questions please call [facility] at ext: [phone]  For M2B questions please call [facility] at ext: [phone]

## 2025-06-20 NOTE — ED PROVIDER NOTES
Subjective   History of Present Illness  Chief Complaint: Left-sided abdominal pain, chest pain shortness of breath      HPI: Patient is a 79-year-old male who presents per private vehicle with wife at bedside known history of Alzheimer's, bladder stones, hypertension and sleep apnea states that yesterday he began having left-sided flank pain symptoms are gotten progressively worse he is now having associated left-sided chest pain and short of breath that is worse with exertion.  Denies recent illness or exposure he is not a smoker.  Wife reports that he took an aspirin this morning prior to arrival.  Denies recent illness or exposure.  Denies recent travel, no surgeries no history of DVT or PE.    Wife reports that she has noted exertional shortness of breath progressively worse for some time now.    PCP: Pia    History provided by:  Patient      Review of Systems  See above as noted    Past Medical History:   Diagnosis Date    Alzheimer disease     Bladder stones     Hypertension     Sleep apnea     CPAP       No Known Allergies    Past Surgical History:   Procedure Laterality Date    BACK SURGERY      lower back    CYSTOSCOPY LITHOLAPAXY BLADDER STONE EXTRACTION N/A 10/30/2023    Procedure: CYSTOSCOPY LITHOLAPAXY BLADDER STONE EXTRACTION;  Surgeon: Ryan Romero MD;  Location: [facility];  Service: Urology;  Laterality: N/A;    CYSTOSCOPY TRANSURETHRAL RESECTION OF PROSTATE N/A 10/30/2023    Procedure: CYSTOSCOPY TRANSURETHRAL RESECTION OF PROSTATE;  Surgeon: Ryan Romero MD;  Location: [facility];  Service: Urology;  Laterality: N/A;    CYSTOSCOPY W/ LASER LITHOTRIPSY      TOTAL SHOULDER REPLACEMENT Right        Family History   Problem Relation Age of Onset    Alzheimer's disease Father        Social History     Socioeconomic History    Marital status:    Tobacco Use    Smoking status: Never    Smokeless tobacco: Never   Vaping Use    Vaping status: Never Used   Substance and Sexual  Activity    Alcohol use: Yes     Alcohol/week: 6.0 standard drinks of alcohol     Types: 6 Shots of liquor per week    Drug use: Not Currently    Sexual activity: Defer           Objective   Physical Exam  Vitals reviewed.   Constitutional:       Appearance: He is obese. He is ill-appearing.   HENT:      Head: Normocephalic.      Mouth/Throat:      Mouth: Mucous membranes are dry.   Eyes:      Pupils: Pupils are equal, round, and reactive to light.   Neck:      Comments: No JVD  Cardiovascular:      Rate and Rhythm: Normal rate.      Pulses: Normal pulses.      Heart sounds: Normal heart sounds.   Pulmonary:      Effort: Tachypnea and accessory muscle usage present.   Abdominal:      General: Bowel sounds are normal.      Palpations: Abdomen is soft.      Tenderness: There is no right CVA tenderness or left CVA tenderness.   Musculoskeletal:      Cervical back: Neck supple. No rigidity.   Skin:     General: Skin is warm.      Capillary Refill: Capillary refill takes less than 2 seconds.      Comments: diaphoretic   Neurological:      General: No focal deficit present.      Mental Status: He is alert and oriented to person, place, and time.      Motor: No weakness.         Procedures  EKG obtained reviewed and interpreted with Dr. Joseph sinus rhythm with a rate of 63 no ST elevation or ectopy noted compared to previous as obtained 10/19/2023 sinus rhythm with a rate of 68 no acute abnormality today.           ED Course  ED Course as of 06/20/25 1316   Fri Jun 20, 2025   1028 WBC(!): 10.85 []   1103 D-Dimer, Quant(!): 4.45 [BH]   1103 Creatinine(!): 1.40 []   1104 eGFR(!): 51.1 []   1124 HS Troponin T(!): 25 [BH]   1139 Call from radiology, preliminary read saddle pulmonary embolism with right heart strain.  Call placed to interventional's. []   1147 Spoke with Dr. Buchanan, advised beginning the patient on high dose heparin, n.p.o. duplex ultrasounds as well as echo's. []      ED Course User Index  []  "Amalia Crowe, APRN      /69   Pulse 60   Temp 97.8 °F (36.6 °C) (Oral)   Resp 16   Ht 177.8 cm (70\")   Wt 113 kg (249 lb 9 oz)   SpO2 94%   BMI 35.81 kg/m²   Labs Reviewed   COMPREHENSIVE METABOLIC PANEL - Abnormal; Notable for the following components:       Result Value    Glucose 111 (*)     Creatinine 1.40 (*)     eGFR 51.1 (*)     All other components within normal limits    Narrative:     GFR Categories in Chronic Kidney Disease (CKD)              GFR Category          GFR (mL/min/1.73)    Interpretation  G1                    90 or greater        Normal or high (1)  G2                    60-89                Mild decrease (1)  G3a                   45-59                Mild to moderate decrease  G3b                   30-44                Moderate to severe decrease  G4                    15-29                Severe decrease  G5                    14 or less           Kidney failure    (1)In the absence of evidence of kidney disease, neither GFR category G1 or G2 fulfill the criteria for CKD.    eGFR calculation 2021 CKD-EPI creatinine equation, which does not include race as a factor   TROPONIN - Abnormal; Notable for the following components:    HS Troponin T 25 (*)     All other components within normal limits    Narrative:     High Sensitive Troponin T Reference Range:  <14.0 ng/L- Negative Female for AMI  <22.0 ng/L- Negative Male for AMI  >=14 - Abnormal Female indicating possible myocardial injury.  >=22 - Abnormal Male indicating possible myocardial injury.   Clinicians would have to utilize clinical acumen, EKG, Troponin, and serial changes to determine if it is an Acute Myocardial Infarction or myocardial injury due to an underlying chronic condition.        D-DIMER, QUANTITATIVE - Abnormal; Notable for the following components:    D-Dimer, Quantitative 4.45 (*)     All other components within normal limits    Narrative:     According to the assay 's published package " "insert, a normal (<0.50 MCGFEU/mL) D-dimer result in conjunction with a non-high clinical probability assessment, excludes deep vein thrombosis (DVT) and pulmonary embolism (PE) with high sensitivity.    D-dimer values increase with age and this can make VTE exclusion of an older population difficult. To address this, the [employer], based on best available evidence and recent guidelines, recommends that clinicians use age-adjusted D-dimer thresholds in patients greater than 50 years of age with: a) a low probability of PE who do not meet all Pulmonary Embolism Rule Out Criteria, or b) in those with intermediate probability of PE.   The formula for an age-adjusted D-dimer cut-off is \"age/100\".  For example, a 60 year old patient would have an age-adjusted cut-off of 0.60 MCGFEU/mL and an 80 year old 0.80 MCGFEU/mL.   CBC WITH AUTO DIFFERENTIAL - Abnormal; Notable for the following components:    WBC 10.85 (*)     Neutrophils, Absolute 7.18 (*)     Monocytes, Absolute 0.92 (*)     All other components within normal limits   HIGH SENSITIVITIY TROPONIN T 1HR - Abnormal; Notable for the following components:    HS Troponin T 23 (*)     All other components within normal limits    Narrative:     High Sensitive Troponin T Reference Range:  <14.0 ng/L- Negative Female for AMI  <22.0 ng/L- Negative Male for AMI  >=14 - Abnormal Female indicating possible myocardial injury.  >=22 - Abnormal Male indicating possible myocardial injury.   Clinicians would have to utilize clinical acumen, EKG, Troponin, and serial changes to determine if it is an Acute Myocardial Infarction or myocardial injury due to an underlying chronic condition.        PROTIME-INR - Abnormal; Notable for the following components:    Protime 14.3 (*)     INR 1.11 (*)     All other components within normal limits   HEPARIN ANTI XA - Abnormal; Notable for the following components:    Heparin Anti-Xa (UFH) <0.10 (*)     All other components " within normal limits    Narrative:     Anti-Xa Reference Ranges:  VTE (PE/DVT)  0.3-0.7  Cardiac or Other (Not VTE) 0.3-0.5     LIPASE - Normal   BNP (IN-HOUSE) - Normal    Narrative:     This assay is used as an aid in the diagnosis of individuals suspected of having heart failure. It can be used as an aid in the diagnosis of acute decompensated heart failure (ADHF) in patients presenting with signs and symptoms of ADHF to the emergency department (ED). In addition, NT-proBNP of <300 pg/mL indicates ADHF is not likely.    Age Range Result Interpretation  NT-proBNP Concentration (pg/mL:      <50             Positive            >450                   Gray                 300-450                    Negative             <300    50-75           Positive            >900                  Gray                300-900                  Negative            <300      >75             Positive            >1800                  Gray                300-1800                  Negative            <300   APTT - Normal   URINALYSIS W/ CULTURE IF INDICATED   HEPARIN ANTI XA   HEPARIN ANTI XA   URINALYSIS, MICROSCOPIC ONLY   CBC AND DIFFERENTIAL    Narrative:     The following orders were created for panel order CBC & Differential.  Procedure                               Abnormality         Status                     ---------                               -----------         ------                     CBC Auto Differential[[id number]]        Abnormal            Final result                 Please view results for these tests on the individual orders.   EXTRA TUBES    Narrative:     The following orders were created for panel order Extra Tubes.  Procedure                               Abnormality         Status                     ---------                               -----------         ------                     Gold Top - SST[[id number]]                                   Final result                 Please view results for these tests  on the individual orders.   GOLD TOP - SST     Medications   sodium chloride 0.9 % flush 10 mL (10 mL Intravenous Given 6/20/25 1019)   heparin 22240 units/250 mL (100 units/mL) in 0.45 % NaCl infusion (13.4 Units/kg/hr × 112 kg Intravenous New Bag 6/20/25 1212)   heparin bolus from bag solution 4,000 Units (has no administration in time range)   heparin bolus from bag solution 2,000 Units (has no administration in time range)   ketorolac (TORADOL) injection 15 mg (15 mg Intravenous Given 6/20/25 1040)   iopamidol (ISOVUE-370) 76 % injection 100 mL (100 mL Intravenous Given 6/20/25 1122)   heparin bolus from bag solution 9,000 Units (9,000 Units Intravenous Bolus from Bag 6/20/25 1217)     CT Angiogram Chest Pulmonary Embolism  Result Date: 6/20/2025  Impression: 1. Extensive bilateral pulmonary emboli with saddle pulmonary embolus and emboli in the left and right main pulmonary arteries extending into multiple lobar and segmental branches bilaterally. 2. Interventricular septum mildly deviated to left with RV to LV ratio measuring 1.2 compatible with right heart strain. 3. Mild left lower lobe peripheral groundglass opacities compatible with developing pulmonary infarct. 4. Extensive coronary artery atherosclerosis. 5. Hepatic steatosis, cholelithiasis, and additional chronic findings above. I discussed critical findings with referring provider VIKI Giordano, at 11:39 a.m. on 6/20/2025. Electronically Signed: Francisco Holloway MD  6/20/2025 11:49 AM EDT  Workstation ID: [id number]    XR Chest 1 View  Result Date: 6/20/2025  Impression: Mild left lower lobe opacities with volume loss favoring atelectasis. Electronically Signed: Lee Huff MD  6/20/2025 10:13 AM EDT  Workstation ID: [id number]                                                     Medical Decision Making  Patient presented with above complaints, noted physical exam was completed he was placed on a cardiac monitor and an IV was established  significant elevation of D-dimer 4.45 prompted CT PE protocol which did confirm a saddle pulmonary embolism as well as extensive pulmonary emboli with right heart strain, left-sided pulmonary infarct.  Patient was started on heparin with bedside echo as well as duplex ordered, patient to be kept n.p.o. possible consideration for thrombectomy.  proBNP within normal limits troponin slightly elevated at 25 pending delta.  Patient will be admitted to ICU for continued monitoring and treatment.  At bedside have discussed this finding with the patient as well as wife who have given verbal understanding, no further questions or complaints at time of admission.    PERC Rule for Pulmonary Embolism - MDCalc  Calculated on Jun 20 2025 1:15 PM  1 criteria -> If any criteria are positive, the PERC rule cannot be used to rule out PE in this patient.    I have personally provided 60 minutes of critical care time exclusive of time spent on separately billable procedures.  Time includes review of laboratory data radiology results as we as discussion with my attending physician and monitoring for potential decompensation.  Interventions were performed as documented above.    Patient case discussed with Dr. Buchanan as well as Dr. Joseph    Chart review: 4/17/2025 outpatient visit with [facility] foot [facility] related to ingrown toenail, follow-up    Labs: See ED course    Radiology: Imaging reviewed by me and interpreted by radiologist.    Medications Medications - No data to display    Part of this note may be an electronic transcription/translation of spoken language to printed text using the Dragon Dictation System.    Appropriate PPE worn during exam.      Note Disclaimer: At [facility], we believe that sharing information builds trust and better  relationships. You are receiving this note because you recently visited [facility]. It is possible you will see health information before a provider has talked with you about  it. This kind of information can be easy to misunderstand. To help you fully understand what it means for your health, we urge you to discuss this note with your provider.      Problems Addressed:  Acute saddle pulmonary embolism with acute cor pulmonale: complicated acute illness or injury  Dyspnea, unspecified type: complicated acute illness or injury    Amount and/or Complexity of Data Reviewed  Labs: ordered. Decision-making details documented in ED Course.  Radiology: ordered.  ECG/medicine tests: ordered.    Risk  Prescription drug management.  Decision regarding hospitalization.        Final diagnoses:   Acute saddle pulmonary embolism with acute cor pulmonale   Dyspnea, unspecified type   Chest pain, unspecified type       ED Disposition  ED Disposition       ED Disposition   Decision to Admit    Condition   --    Comment   Level of Care: Critical Care [6]   Admitting Physician: MAGO NEWTON [[id number]]   Attending Physician: MAGO NEWTON [[id number]]                 No follow-up provider specified.       Medication List      No changes were made to your prescriptions during this visit.            Amalia Crowe, APRN  06/20/25 1316

## 2025-06-20 NOTE — H&P
Critical Care History and Physical     Mango Ling : 1945 MRN:[id number] LOS:0 ROOM: 2304/1     Reason for admission: Acute saddle pulmonary embolism with acute cor pulmonale     Assessment / Plan     Acute saddle pulmonary embolism with acute cor pulmonale  Possible pulmonary infarct of left lower lobe  Acute left lower extremity DVT in the distal femoral and popliteal vein  At this time, felt to be an unprovoked DVT/PE; patient is relatively sedentary due to bilateral lower extremity weakness per his spouse.  Radiology studies:  CT PE Protocol (): Extensive bilateral pulmonary emboli with saddle pulmonary embolism and emboli in the left and right main pulmonary arteries extending into multiple lobar and segmental branches bilaterally, interventricular septum mildly deviated to the left with RV to LV ratio measuring 1.2 compatible with right heart strain, mild left lower lobe peripheral groundglass opacities compatible with developing pulmonary infarct.  Bilateral lower extremity duplex: Acute left lower extremity DVT in the distal femoral and popliteal veins.  Cardiology consulted, planning [facility].  Started on heparin gtt in ED. Continue heparin gtt until tomorrow AM, then will transition to oral anticoagulant.  Pharmacy running DOACs on insurance to help determine affordability for selection of discharge plan.    Essential hypertension: well controlled.   Home regimen of losartan .   Titrate medications as needed.    CKD  Remains nonoliguric. Baseline creatinine 1.42, 1.40 on admission.  Monitor Input/Output very closely.   Avoids NSAIDs, nephrotoxic medications, and hypotension.  Monitor and trend labs.    Alzheimer's dementia: Continue Aricept, Namenda.  Anxiety/Depression: Continue Lexapro.  OAB: On Gemtesa as OP.  BPH: Continue Proscar.  Glaucoma: On timolol.  GERD: Continue PPI.  AGUILA  Obesity: Body mass index is 35.73 kg/m².      Code Status (Patient has no pulse and is not breathing): CPR  "(Attempt to Resuscitate)  Medical Interventions (Patient has pulse or is breathing): Full Support       Nutrition:   NPO Diet NPO Type: Strict NPO     VTE Prophylaxis:  Pharmacologic VTE prophylaxis orders are present.       History of Present illness     Mango Ling is a 79 y.o. male with PMH of Alzheimer's disease, anxiety/depression, OAB/BPH, glaucoma, GERD, AGUILA, Obesity, and presented to the hospital for left lower chest pain, shortness of breath, diaphoresis with exertion, and was admitted with a principal diagnosis of Acute saddle pulmonary embolism with acute cor pulmonale.  Patient spouse states that patient has been having worsening shortness of breath over the past month, but it has severely worsened over the past few days.  His new onset chest pain started within the past 24 hours.  It was this new onset chest pain that prompted him to come in for evaluation.  Patient denies any long trips in the car or airplane, recent surgeries, prior DVTs or PEs, familial history of blood clotting.    In the ED, labs were obtained with the following notable labs: Troponin 25 with reflex of 23, creatinine 1.4, D-dimer 4.45, INR 1.11, WBC 10.85 without bandemia.  UA was with 6-10 WBC, trace leukocytes, without any evidence of dysuria, though a reflex urine culture is in process.  Chest x-ray with mild left lower lobe opacities and volume loss favoring atelectasis.  CT PE protocol was with the following impression: \"1. Extensive bilateral pulmonary emboli with saddle pulmonary embolus and emboli in the left and right main pulmonary arteries extending into multiple lobar and segmental branches bilaterally. 2. Interventricular septum mildly deviated to left with RV to LV ratio measuring 1.2 compatible with right heart strain. 3. Mild left lower lobe peripheral groundglass opacities compatible with developing pulmonary infarct. 4. Extensive coronary artery atherosclerosis. 5. Hepatic steatosis, cholelithiasis, and " "additional chronic findings above.\"  Cardiology was consulted.  Patient had venous duplexes and echocardiogram ordered as per cardiology recommendation.  Patient is planned to be admitted to the ICU after being started on a heparin drip, with planned thrombectomy this afternoon.  Intensivist service was consulted to admit patient.    ACP: In the event that patient is able to make his own decisions, his spouse decision maker.  No advance care plan documentation on file.    Patient was seen and examined on 06/20/25 at 14:20 EDT .      Past Medical/Surgical/Social/Family History & Allergies     Past Medical History:   Diagnosis Date    Alzheimer disease     Bladder stones     Hypertension     Sleep apnea     CPAP      Past Surgical History:   Procedure Laterality Date    BACK SURGERY      lower back    CYSTOSCOPY LITHOLAPAXY BLADDER STONE EXTRACTION N/A 10/30/2023    Procedure: CYSTOSCOPY LITHOLAPAXY BLADDER STONE EXTRACTION;  Surgeon: Ryan Romero MD;  Location: [facility];  Service: Urology;  Laterality: N/A;    CYSTOSCOPY TRANSURETHRAL RESECTION OF PROSTATE N/A 10/30/2023    Procedure: CYSTOSCOPY TRANSURETHRAL RESECTION OF PROSTATE;  Surgeon: Ryan Romero MD;  Location: [facility];  Service: Urology;  Laterality: N/A;    CYSTOSCOPY W/ LASER LITHOTRIPSY      TOTAL SHOULDER REPLACEMENT Right       Social History     Socioeconomic History    Marital status:    Tobacco Use    Smoking status: Never    Smokeless tobacco: Never   Vaping Use    Vaping status: Never Used   Substance and Sexual Activity    Alcohol use: Yes     Alcohol/week: 6.0 standard drinks of alcohol     Types: 6 Shots of liquor per week    Drug use: Not Currently    Sexual activity: Defer      Family History   Problem Relation Age of Onset    Alzheimer's disease Father       No Known Allergies   Social Drivers of Health     Tobacco Use: Low Risk  (6/20/2025)    Patient History     Smoking Tobacco Use: Never     Smokeless Tobacco Use: " Never     Passive Exposure: Not on file   Alcohol Use: Alcohol Misuse (6/20/2025)    AUDIT-C     Frequency of Alcohol Consumption: 4 or more times a week     Average Number of Drinks: 1 or 2     Frequency of Binge Drinking: Weekly   Financial Resource Strain: Not on file   Food Insecurity: No Food Insecurity (10/31/2023)    Hunger Vital Sign     Worried About Running Out of Food in the Last Year: Never true     Ran Out of Food in the Last Year: Never true   Transportation Needs: Not on file   Physical Activity: Not on file   Stress: Not on file   Social Connections: Not on file   Interpersonal Safety: Not At Risk (6/20/2025)    Abuse Screen     Unsafe at Home or Work/School: no     Feels Threatened by Someone?: no     Does Anyone Keep You from Contacting Others or Doint Things Outside the Home?: no     Physical Sign of Abuse Present: no   Depression: Not on file   Housing Stability: Unknown (6/20/2025)    Housing Stability     Current Living Arrangements: home     Potentially Unsafe Housing Conditions: Not on file   Utilities: Not At Risk (10/31/2023)    [facility] Utilities     Threatened with loss of utilities: No   Health Literacy: Unknown (10/31/2023)    Education     Help with school or training?: Not on file     Preferred Language: English   Employment: Not on file   Disabilities: Not At Risk (6/20/2025)    Disabilities     Concentrating, Remembering, or Making Decisions Difficulty: no     Doing Errands Independently Difficulty: no        Home Medications     Prior to Admission medications    Medication Sig Start Date End Date Taking? Authorizing Provider   cephalexin (KEFLEX) 500 MG capsule Take 1 capsule by mouth 3 (Three) Times a Day. 4/11/25   Mark Fernandes DPM   donepezil (ARICEPT) 5 MG tablet TAKE 1 TABLET BY MOUTH TWICE DAILY 3/24/25   Seipel, Joseph F, MD   escitalopram (Lexapro) 10 MG tablet Take 1 tablet by mouth Daily. 4/17/25 4/17/26  Seipel, Joseph F, MD   finasteride (PROSCAR) 5 MG tablet  4/9/25    Farrukh Tristan MD   fluticasone (FLONASE) 50 MCG/ACT nasal spray  11/25/24   Farrukh Tristan MD   Gemtesa 75 MG tablet  9/24/24   Farrukh Tristan MD   latanoprost (XALATAN) 0.005 % ophthalmic solution  2/20/24   Farrukh Tristan MD   losartan (COZAAR) 100 MG tablet  9/26/24   Farrukh Tristan MD   memantine (NAMENDA) 5 MG tablet One tab daily for one month then one tab bid 12/10/24   Seipel, Joseph F, MD   nitrofurantoin, macrocrystal-monohydrate, (Macrobid) 100 MG capsule Take 1 capsule by mouth 2 (Two) Times a Day. 10/31/23   Ryan Romero MD   pantoprazole (PROTONIX) 40 MG EC tablet  10/14/24   Farrukh Tristan MD   timolol (TIMOPTIC) 0.5 % ophthalmic solution  2/26/24   Farrukh Tristan MD   triamterene-hydrochlorothiazide (MAXZIDE-25) 37.5-25 MG per tablet  2/28/24   Farrukh Tristan MD   vitamin B-12 (CYANOCOBALAMIN) 1000 MCG tablet Take 1 tablet by mouth Daily.    Farrukh Tristan MD        Objective / Physical Exam     Vital signs:  Temp: 97.8 °F (36.6 °C)  BP: 131/65  Heart Rate: 63  Resp: 16  SpO2: 95 %  Weight: 113 kg (249 lb)    Admission Weight: Weight: 112 kg (246 lb 14.6 oz)    Physical Exam  Vitals and nursing note reviewed.   Constitutional:       General: He is not in acute distress.     Appearance: He is obese. He is ill-appearing. He is not toxic-appearing or diaphoretic.   HENT:      Head: Normocephalic and atraumatic.      Nose: Nose normal.      Mouth/Throat:      Mouth: Mucous membranes are moist.      Pharynx: Oropharynx is clear.   Eyes:      Comments: Eyelids normal   Cardiovascular:      Rate and Rhythm: Normal rate and regular rhythm.      Pulses: Normal pulses.   Pulmonary:      Effort: Pulmonary effort is normal. No respiratory distress.      Comments: Diminished basilar breath sounds, without wheezes, rhonchi, or rales.  Abdominal:      General: There is no distension.      Tenderness: There is no abdominal tenderness.      Comments:  Protuberant.   Musculoskeletal:      Right lower leg: Edema present.      Left lower leg: Edema present.   Skin:     General: Skin is warm and dry.   Neurological:      General: No focal deficit present.      Mental Status: He is alert and oriented to person, place, and time.      Comments: Generalized weakness of BLE, at baseline, uses can when ambulating outside the home.   Psychiatric:      Comments: Calm, cooperative.          Labs     Results from last 7 days   Lab Units 06/20/25  1015   WBC 10*3/mm3 10.85*   HEMOGLOBIN g/dL 13.7   HEMATOCRIT % 42.3   PLATELETS 10*3/mm3 243      Results from last 7 days   Lab Units 06/20/25  1015   SODIUM mmol/L 140   POTASSIUM mmol/L 3.9   CHLORIDE mmol/L 104   CO2 mmol/L 24.2   ANION GAP mmol/L 11.8   BUN mg/dL 16.0   CREATININE mg/dL 1.40*   GLUCOSE mg/dL 111*   ALT (SGPT) U/L 30   AST (SGOT) U/L 30   ALK PHOS U/L 90            Imaging     CT Angiogram Chest Pulmonary Embolism  Result Date: 6/20/2025  Impression: 1. Extensive bilateral pulmonary emboli with saddle pulmonary embolus and emboli in the left and right main pulmonary arteries extending into multiple lobar and segmental branches bilaterally. 2. Interventricular septum mildly deviated to left with RV to LV ratio measuring 1.2 compatible with right heart strain. 3. Mild left lower lobe peripheral groundglass opacities compatible with developing pulmonary infarct. 4. Extensive coronary artery atherosclerosis. 5. Hepatic steatosis, cholelithiasis, and additional chronic findings above. I discussed critical findings with referring provider VIKI Giordano, at 11:39 a.m. on 6/20/2025. Electronically Signed: Francisco Holloway MD  6/20/2025 11:49 AM EDT  Workstation ID: [id number]    XR Chest 1 View  Result Date: 6/20/2025  Impression: Mild left lower lobe opacities with volume loss favoring atelectasis. Electronically Signed: Lee Huff MD  6/20/2025 10:13 AM EDT  Workstation ID: [id number]        EKG: My independent  evaluation showed Sinus rhythm, without ST or T wave abnormalities, heart rate 63, QTc 442.    Current Medications     Scheduled Meds:  donepezil, 5 mg, Oral, BID  escitalopram, 10 mg, Oral, Daily  finasteride, 5 mg, Oral, Daily  [Held by provider] losartan, 100 mg, Oral, Q24H  memantine, 5 mg, Oral, Q12H  mupirocin, 1 Application, Each Nare, BID  [START ON 6/21/2025] pantoprazole, 40 mg, Oral, Q AM  sodium chloride, 10 mL, Intravenous, Q12H         Continuous Infusions:  heparin, 13.4 Units/kg/hr, Last Rate: 13.4 Units/kg/hr (06/20/25 1212)       Plan discussed with RN. Reviewed all other data in the last 24 hours, including but not limited to vitals, labs, microbiology, imaging and pertinent notes from other providers.  Plan also discussed with patient and spouse at the bedside.      VIKI Arce   Critical Care  06/20/25   14:20 EDT

## 2025-06-20 NOTE — CONSULTS
Cardiology Consult Note    Patient Identification:  Name: Mango Ling  Age: 79 y.o.  Sex: male  :  1945  MRN: [id number]             Requesting Physician : Amalia Crowe    Reason for Consultation / Chief Complaint :   Saddle PE    History of Present Illness:      Mr. Mango Ling has PMH of    Hypertension  AGUILA  Kidney stone  Alzheimer's    Presented with complaint of worsening shortness of breath was found to have bilateral PE therefore interventional cardiology was consulted.  Patient has shortness of breath for over a year and decreased activity level and chronic pain.    Data:  2025: HS troponin is 25 and 23.  proBNP normal at 135.  CMP with a creatinine of 1.4, EGFR 51, glucose 111.  CBC with a white count of 10.8, D-dimer 4.45  CT PE study 2025: Reviewed/interpreted by me: Saddle PE and emboli in left and right main arteries extending into multiple lobar and subsegmental branches bilaterally.  RV to LV ratio is 1.2 compatible with RV strain.  Mild left lower lobe groundglass opacity compatible with developing pulmonary infarct.  Hepatic steatosis and coronary atherosclerosis  EKG 2025 reviewed/interpreted by me reveals sinus rhythm with rate of 63 bpm  Echocardiogram 2025 reviewed/interpreted by me reveals normal LV size and function EF of 5660%.  Mildly reduced RV function and mild RV enlargement and moderate mitral valve thickening      Assessment:  :    Acute saddle PE with cor pulmonale  DVT  Hypertension  Obesity with AGUILA  Hyperglycemia  Renal insufficiency      Recommendations / Plan:        Patient is admitted to intensivist  Was started on IV heparin.  Patient was taken emergently to Cath Lab for pulmonary thrombectomy.  Significant amount was clot aspirated from bilateral PAs and multiple branches.  Will start patient on Eliquis.  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  Get outpatient heme-onc evaluation for hypercoagulable  state.  Discussed with intensivist team practitioner Ximena Benitez      Cardiographics  ECG: EKG tracing was  personally reviewed/interpreted by me  ECG 12 Lead Dyspnea   Preliminary Result   HEART RATE=63  bpm   RR Ibbiazkx=749  ms   IN Gmoeizkx=325  ms   P Horizontal Axis=9  deg   P Front Axis=39  deg   QRSD Interval=94  ms   QT Cwmqzfas=034  ms   JOfE=609  ms   QRS Axis=25  deg   T Wave Axis=41  deg   - NORMAL ECG -   Sinus rhythm   Date and Time of Study:2025-06-20 09:49:05          Telemetry: Sinus rhythm             Diagnosis Plan   1. Acute saddle pulmonary embolism with acute cor pulmonale  Case Request Cath Lab: Pulmonary angiography    Case Request Cath Lab: Pulmonary angiography    Cardiac Catheterization/Vascular Study    Cardiac Catheterization/Vascular Study      2. Dyspnea, unspecified type        3. Chest pain, unspecified type                             Past Medical History:  Past Medical History:   Diagnosis Date    Alzheimer disease     Bladder stones     Hypertension     Sleep apnea     CPAP     Past Surgical History:  Past Surgical History:   Procedure Laterality Date    BACK SURGERY      lower back    CYSTOSCOPY LITHOLAPAXY BLADDER STONE EXTRACTION N/A 10/30/2023    Procedure: CYSTOSCOPY LITHOLAPAXY BLADDER STONE EXTRACTION;  Surgeon: Ryan Romero MD;  Location: [facility];  Service: Urology;  Laterality: N/A;    CYSTOSCOPY TRANSURETHRAL RESECTION OF PROSTATE N/A 10/30/2023    Procedure: CYSTOSCOPY TRANSURETHRAL RESECTION OF PROSTATE;  Surgeon: Ryan Romero MD;  Location: [facility];  Service: Urology;  Laterality: N/A;    CYSTOSCOPY W/ LASER LITHOTRIPSY      TOTAL SHOULDER REPLACEMENT Right       Allergies:  No Known Allergies  Home Meds:  Medications Prior to Admission   Medication Sig Dispense Refill Last Dose/Taking    cephalexin (KEFLEX) 500 MG capsule Take 1 capsule by mouth 3 (Three) Times a Day. 21 capsule 0     donepezil (ARICEPT) 5 MG tablet TAKE 1 TABLET BY MOUTH TWICE DAILY  60 tablet 0     escitalopram (Lexapro) 10 MG tablet Take 1 tablet by mouth Daily. 30 tablet 2     finasteride (PROSCAR) 5 MG tablet        fluticasone (FLONASE) 50 MCG/ACT nasal spray        Gemtesa 75 MG tablet        latanoprost (XALATAN) 0.005 % ophthalmic solution        losartan (COZAAR) 100 MG tablet        memantine (NAMENDA) 5 MG tablet One tab daily for one month then one tab bid 60 tablet 5     nitrofurantoin, macrocrystal-monohydrate, (Macrobid) 100 MG capsule Take 1 capsule by mouth 2 (Two) Times a Day. 14 capsule 0     pantoprazole (PROTONIX) 40 MG EC tablet        timolol (TIMOPTIC) 0.5 % ophthalmic solution        triamterene-hydrochlorothiazide (MAXZIDE-25) 37.5-25 MG per tablet        vitamin B-12 (CYANOCOBALAMIN) 1000 MCG tablet Take 1 tablet by mouth Daily.        Current Meds:     Current Facility-Administered Medications:     apixaban (ELIQUIS) tablet 10 mg, 10 mg, Oral, BID **FOLLOWED BY** [START ON 6/27/2025] apixaban (ELIQUIS) tablet 5 mg, 5 mg, Oral, BID, Von Buchanan MD    heparin 03236 units/250 mL (100 units/mL) in 0.45 % NaCl infusion, 13.4 Units/kg/hr, Intravenous, Titrated, Amalia Crowe, APRN, Last Rate: 15 mL/hr at 06/20/25 1212, 13.4 Units/kg/hr at 06/20/25 1212    [Transfer Hold] heparin bolus from bag solution 2,000 Units, 2,000 Units, Intravenous, PRN, Amalia Crowe, APRN    [Transfer Hold] heparin bolus from bag solution 4,000 Units, 4,000 Units, Intravenous, PRN, Amalia Crowe APRN    [COMPLETED] Insert Peripheral IV, , , Once **AND** [Transfer Hold] sodium chloride 0.9 % flush 10 mL, 10 mL, Intravenous, PRN, Amalia Crowe, APRN, 10 mL at 06/20/25 1019  Social History:   Social History     Tobacco Use    Smoking status: Never    Smokeless tobacco: Never   Substance Use Topics    Alcohol use: Yes     Alcohol/week: 6.0 standard drinks of alcohol     Types: 6 Shots of liquor per week      Family History:  Family History   Problem Relation Age of  "Onset    Alzheimer's disease Father         Review of Systems : Review of Systems   All other systems reviewed and are negative.           Constitutional:  Temp:  [97.8 °F (36.6 °C)-97.9 °F (36.6 °C)] 97.8 °F (36.6 °C)  Heart Rate:  [60-75] 65  Resp:  [15-25] 15  BP: (119-162)/(58-86) 134/84    Physical Exam   /84   Pulse 65   Temp 97.8 °F (36.6 °C) (Oral)   Resp 15   Ht 177.8 cm (70\")   Wt 113 kg (249 lb)   SpO2 99%   BMI 35.73 kg/m²   Physical Exam  General:  Appears in no acute distress  Eyes: Sclerae are anicteric,  conjunctivae are clear   HEENT:  No JVD. Thyroid not visibly enlarged. No mucosal pallor or cyanosis  Respiratory: Respirations regular and unlabored at rest.  Bilaterally good breath sounds with good air entry in all fields. No crackles, rubs or wheezes auscultated  Cardiovascular: S1,S2 Regular rate and rhythm. No murmur, rub or gallop auscultated. No pretibial pitting edema  Gastrointestinal: Abdomen nondistended.  Musculoskeletal:  No abnormal movements  Extremities: No digital clubbing or cyanosis  Skin: Color pink. Skin warm and dry to touch.   Neuro: Alert and awake, no lateralizing deficits appreciated        Echocardiogram:   Results for orders placed during the hospital encounter of 06/20/25    ADULT TRANSTHORACIC ECHO COMPLETE W/ CONT IF NECESSARY PER PROTOCOL    Interpretation Summary    Left ventricular ejection fraction appears to be 56 - 60%.    Mildly reduced right ventricular systolic function noted.    The right ventricular cavity is mildly dilated.    There is moderate, bileaflet mitral valve thickening present.      STRESS TEST        Cardiolite (Tc-99m sestamibi) stress test    HEART CATHETERIZATION  No results found for this or any previous visit.        Imaging  Chest X-ray:   Imaging Results (Last 24 Hours)       Procedure Component Value Units Date/Time    CT Angiogram Chest Pulmonary Embolism [[id number]] Collected: 06/20/25 1135     Updated: 06/20/25 1151    " Narrative:      CT ANGIOGRAM CHEST PULMONARY EMBOLISM    Date of Exam: 6/20/2025 11:21 AM EDT    Indication: Evaluate for pulmonary embolus.    Comparison: CT abdomen and pelvis 7/10/2016    Technique: Axial CT images were obtained of the chest after the uneventful intravenous administration of iodinated contrast utilizing pulmonary embolism protocol.  In addition, a 3-D volume rendered image was created for interpretation.  Sagittal and   coronal reconstructions were performed.  Automated exposure control and iterative reconstruction methods were used.    Findings:  Visualized soft tissues of the lower neck are without acute abnormality. Heart size is normal. Extensive coronary artery atherosclerosis. Negative for pericardial effusion.    There are extensive pulmonary emboli bilaterally with saddle pulmonary embolus. Multiple pulmonary filling defects noted in the bilateral left and right main pulmonary arteries extending into multiple lobar and segmental branches bilaterally. The   interventricular septum is mildly deviated to the left with RV to LV ratio measuring 1.2 compatible with underlying right heart strain.    The trachea and mainstem bronchi are patent. There is no pneumothorax. Mild peripheral groundglass opacities in the left lower lobe which may relate to pulmonary infarct. No suspicious pulmonary nodule or mass. Trace left pleural effusion. No right   pleural effusion.    Liver demonstrates extensive hepatic steatosis. There are several low-attenuation liver lesions suggesting cysts for example a right hepatic lobe measuring 7 cm in the left hepatic lobe measuring 2.8 cm. There is a large calcified 3 cm stone in the   gallbladder. No pericholecystic inflammation. Normal caliber common bile duct. Normal adrenal glands. The spleen is normal. The pancreas is without acute abnormality.     Kidneys are without hydronephrosis. Small exophytic lesion at the posterior cortex of the right mid kidney is  indeterminate based on size but stable from 2016 suggesting benign etiology possibly a small hemorrhagic or proteinaceous cyst measuring 6 mm   (4/162). Celiac artery and super mesenteric artery patent. Moderate atherosclerotic calcification of the visualized upper abdominal aorta. Right reverse shoulder prosthesis. No aggressive osseous lesion or acute fracture.      Impression:      Impression:  1. Extensive bilateral pulmonary emboli with saddle pulmonary embolus and emboli in the left and right main pulmonary arteries extending into multiple lobar and segmental branches bilaterally.  2. Interventricular septum mildly deviated to left with RV to LV ratio measuring 1.2 compatible with right heart strain.  3. Mild left lower lobe peripheral groundglass opacities compatible with developing pulmonary infarct.  4. Extensive coronary artery atherosclerosis.  5. Hepatic steatosis, cholelithiasis, and additional chronic findings above.    I discussed critical findings with referring provider VIKI Giordano, at 11:39 a.m. on 6/20/2025.                Electronically Signed: Francisco Holloway MD    6/20/2025 11:49 AM EDT    Workstation ID: [id number]    XR Chest 1 View [[id number]] Collected: 06/20/25 1012     Updated: 06/20/25 1015    Narrative:      XR CHEST 1 VW    Date of Exam: 6/20/2025 10:00 AM EDT    Indication: Left chest pain    Comparison: Chest radiograph 5/10/2023    Findings:  Lung volumes slightly low on the left with mild retrocardiac opacities. No infectious appearing consolidation, edema, large effusion or pneumothorax. Heart size normal. Aortic atherosclerotic disease. Right reverse total shoulder prosthesis. Degenerative   related osseous change.      Impression:      Impression:  Mild left lower lobe opacities with volume loss favoring atelectasis.      Electronically Signed: Lee Huff MD    6/20/2025 10:13 AM EDT    Workstation ID: [id number]            Lab Review: I have reviewed the labs  Results  from last 7 days   Lab Units 06/20/25  1145 06/20/25  1015   HSTROP T ng/L 23* 25*         Results from last 7 days   Lab Units 06/20/25  1015   SODIUM mmol/L 140   POTASSIUM mmol/L 3.9   BUN mg/dL 16.0   CREATININE mg/dL 1.40*   CALCIUM mg/dL 9.6         Results from last 7 days   Lab Units 06/20/25  1015   PROBNP pg/mL 135.0     Results from last 7 days   Lab Units 06/20/25  1015   WBC 10*3/mm3 10.85*   HEMOGLOBIN g/dL 13.7   HEMATOCRIT % 42.3   PLATELETS 10*3/mm3 243     Results from last 7 days   Lab Units 06/20/25  1015   INR  1.11*   APTT seconds 29.9             Von Buchanan MD  6/20/2025, 15:18 EDT      EMR Dragon/Transcription:   Dictated utilizing Dragon dictation

## 2025-06-20 NOTE — SIGNIFICANT NOTE
Pharmacy discussed the options for anticoagulants with patient, and patient has stated that apixaban is affordable for he and his wife.  Due to having prior issues of getting apixaban starter packs for patient at out patient pharmacies in the area, have already arranged for meds to beds to facilitate patient's starter pack to be delivered today.  At this will not need to be prescribed at discharge, he will just need further refills on his apixaban.    Electronically signed by VIKI Arce, 06/20/25, 4:02 PM EDT.

## 2025-06-20 NOTE — PLAN OF CARE
Goal Outcome Evaluation:   Pt taken down for pulmonary thrombectomy. LE Duplex completed, LLE DVT. Echo completed. Heparin drip infusing, Eliquis to be started.    Flowstasis device in place until 1940..

## 2025-06-20 NOTE — ED NOTES
Nursing report ED to floor  Mango MAZA [place]  79 y.o.  male    HPI:   Chief Complaint   Patient presents with    Shortness of Breath       Admitting doctor:   C[place] Joseph DO    Admitting diagnosis:   The primary encounter diagnosis was Acute saddle pulmonary embolism with acute cor pulmonale. A diagnosis of Dyspnea, unspecified type was also pertinent to this visit.    Code status:   Current Code Status       Date Active Code Status Order ID Comments User Context       Prior            Allergies:   Patient has no known allergies.    Isolation:  No active isolations     Fall Risk:  Fall Risk Assessment was completed, and patient is at low risk for falls.   Predictive Model Details         13 (Low) Factor Value    Calculated 6/20/2025 12:53 Age 79    Risk of Fall Model Active Peripheral IV Present     Imaging order in this encounter Present     Respiratory Rate 20     Magnesium not on file     Number of Distinct Medication Classes administered 4     Andres Scale not on file     Number of administrations of Anti-Rheumatics 1     Total Bilirubin 1.1 mg/dL     Diastolic BP 76     Creatinine 1.4 mg/dL     Clinically Relevant Sex Not Female     Number of administrations of Anti-Coagulants 2     ALT 30 U/L     Albumin 4.3 g/dL     Duration of Current Encounter 0.129 days     Calcium 9.6 mg/dL     Chloride 104 mmol/L     Potassium 3.9 mmol/L         Weight:       06/20/25  0946   Weight: 112 kg (246 lb 14.6 oz)       Intake and Output  No intake or output data in the 24 hours ending 06/20/25 1258    Diet:   Dietary Orders (From admission, onward)       Start     Ordered    06/20/25 1147  NPO Diet NPO Type: Strict NPO  Diet Effective Now        Question:  NPO Type  Answer:  Strict NPO    06/20/25 1146                     Most recent vitals:   Vitals:    06/20/25 0958 06/20/25 1013 06/20/25 1147 06/20/25 1246   BP:   143/76 132/69   BP Location:       Patient Position:       Pulse: 67 63 63 60   Resp:   20 16   Temp:        TempSrc:       SpO2: 95% 97% 96% 94%   Weight:       Height:           Active LDAs/IV Access:   Lines, Drains & Airways       Active LDAs       Name Placement date Placement time Site Days    Peripheral IV 11/01/23 1833 Anterior;Right Forearm 11/01/23 1833  Forearm  596    Peripheral IV 06/20/25 1017 20 G Anterior;Right Wrist 06/20/25  1017  Wrist  less than 1    Peripheral IV 06/20/25 1221 22 G Left;Posterior Hand 06/20/25  1221  Hand  less than 1                    Skin Condition:   Skin Assessments (last day)       None             Labs (abnormal labs have a star):   Labs Reviewed   COMPREHENSIVE METABOLIC PANEL - Abnormal; Notable for the following components:       Result Value    Glucose 111 (*)     Creatinine 1.40 (*)     eGFR 51.1 (*)     All other components within normal limits    Narrative:     GFR Categories in Chronic Kidney Disease (CKD)              GFR Category          GFR (mL/min/1.73)    Interpretation  G1                    90 or greater        Normal or high (1)  G2                    60-89                Mild decrease (1)  G3a                   45-59                Mild to moderate decrease  G3b                   30-44                Moderate to severe decrease  G4                    15-29                Severe decrease  G5                    14 or less           Kidney failure    (1)In the absence of evidence of kidney disease, neither GFR category G1 or G2 fulfill the criteria for CKD.    eGFR calculation 2021 CKD-EPI creatinine equation, which does not include race as a factor   TROPONIN - Abnormal; Notable for the following components:    HS Troponin T 25 (*)     All other components within normal limits    Narrative:     High Sensitive Troponin T Reference Range:  <14.0 ng/L- Negative Female for AMI  <22.0 ng/L- Negative Male for AMI  >=14 - Abnormal Female indicating possible myocardial injury.  >=22 - Abnormal Male indicating possible myocardial injury.   Clinicians would have to  "utilize clinical acumen, EKG, Troponin, and serial changes to determine if it is an Acute Myocardial Infarction or myocardial injury due to an underlying chronic condition.        D-DIMER, QUANTITATIVE - Abnormal; Notable for the following components:    D-Dimer, Quantitative 4.45 (*)     All other components within normal limits    Narrative:     According to the assay 's published package insert, a normal (<0.50 MCGFEU/mL) D-dimer result in conjunction with a non-high clinical probability assessment, excludes deep vein thrombosis (DVT) and pulmonary embolism (PE) with high sensitivity.    D-dimer values increase with age and this can make VTE exclusion of an older population difficult. To address this, the [employer], based on best available evidence and recent guidelines, recommends that clinicians use age-adjusted D-dimer thresholds in patients greater than 50 years of age with: a) a low probability of PE who do not meet all Pulmonary Embolism Rule Out Criteria, or b) in those with intermediate probability of PE.   The formula for an age-adjusted D-dimer cut-off is \"age/100\".  For example, a 60 year old patient would have an age-adjusted cut-off of 0.60 MCGFEU/mL and an 80 year old 0.80 MCGFEU/mL.   CBC WITH AUTO DIFFERENTIAL - Abnormal; Notable for the following components:    WBC 10.85 (*)     Neutrophils, Absolute 7.18 (*)     Monocytes, Absolute 0.92 (*)     All other components within normal limits   HIGH SENSITIVITIY TROPONIN T 1HR - Abnormal; Notable for the following components:    HS Troponin T 23 (*)     All other components within normal limits    Narrative:     High Sensitive Troponin T Reference Range:  <14.0 ng/L- Negative Female for AMI  <22.0 ng/L- Negative Male for AMI  >=14 - Abnormal Female indicating possible myocardial injury.  >=22 - Abnormal Male indicating possible myocardial injury.   Clinicians would have to utilize clinical acumen, EKG, Troponin, and " serial changes to determine if it is an Acute Myocardial Infarction or myocardial injury due to an underlying chronic condition.        PROTIME-INR - Abnormal; Notable for the following components:    Protime 14.3 (*)     INR 1.11 (*)     All other components within normal limits   HEPARIN ANTI XA - Abnormal; Notable for the following components:    Heparin Anti-Xa (UFH) <0.10 (*)     All other components within normal limits    Narrative:     Anti-Xa Reference Ranges:  VTE (PE/DVT)  0.3-0.7  Cardiac or Other (Not VTE) 0.3-0.5     LIPASE - Normal   BNP (IN-HOUSE) - Normal    Narrative:     This assay is used as an aid in the diagnosis of individuals suspected of having heart failure. It can be used as an aid in the diagnosis of acute decompensated heart failure (ADHF) in patients presenting with signs and symptoms of ADHF to the emergency department (ED). In addition, NT-proBNP of <300 pg/mL indicates ADHF is not likely.    Age Range Result Interpretation  NT-proBNP Concentration (pg/mL:      <50             Positive            >450                   Gray                 300-450                    Negative             <300    50-75           Positive            >900                  Gray                300-900                  Negative            <300      >75             Positive            >1800                  Gray                300-1800                  Negative            <300   APTT - Normal   URINALYSIS W/ CULTURE IF INDICATED   HEPARIN ANTI XA   HEPARIN ANTI XA   CBC AND DIFFERENTIAL    Narrative:     The following orders were created for panel order CBC & Differential.  Procedure                               Abnormality         Status                     ---------                               -----------         ------                     CBC Auto Differential[[id number]]        Abnormal            Final result                 Please view results for these tests on the individual orders.   EXTRA TUBES     Narrative:     The following orders were created for panel order Extra Tubes.  Procedure                               Abnormality         Status                     ---------                               -----------         ------                     Gold Top - SST[[id number]]                                   Final result                 Please view results for these tests on the individual orders.   GOLD TOP - SST       LOC: Person, Place, Time, and Situation    Telemetry:  Critical Care    Cardiac Monitoring Ordered: yes    EKG:   ECG 12 Lead Dyspnea   Preliminary Result   HEART RATE=63  bpm   RR Xdzkkcuf=362  ms   [place] Iidhwcyy=636  ms   P Horizontal Axis=9  deg   P Front Axis=39  deg   QRSD Interval=94  ms   QT Sasrnigo=994  ms   JHtO=120  ms   QRS Axis=25  deg   T Wave Axis=41  deg   - NORMAL ECG -   Sinus rhythm   Date and Time of Study:2025-06-20 09:49:05          Medications Given in the ED:   Medications   sodium chloride 0.9 % flush 10 mL (10 mL Intravenous Given 6/20/25 1019)   heparin 95574 units/250 mL (100 units/mL) in 0.45 % NaCl infusion (13.4 Units/kg/hr × 112 kg Intravenous New Bag 6/20/25 1212)   heparin bolus from bag solution 4,000 Units (has no administration in time range)   heparin bolus from bag solution 2,000 Units (has no administration in time range)   ketorolac (TORADOL) injection 15 mg (15 mg Intravenous Given 6/20/25 1040)   iopamidol (ISOVUE-370) 76 % injection 100 mL (100 mL Intravenous Given 6/20/25 1122)   heparin bolus from bag solution 9,000 Units (9,000 Units Intravenous Bolus from Bag 6/20/25 1217)       Imaging results:  CT Angiogram Chest Pulmonary Embolism  Result Date: 6/20/2025  Impression: 1. Extensive bilateral pulmonary emboli with saddle pulmonary embolus and emboli in the left and right main pulmonary arteries extending into multiple lobar and segmental branches bilaterally. 2. Interventricular septum mildly deviated to left with RV to LV ratio measuring 1.2  compatible with right heart strain. 3. Mild left lower lobe peripheral groundglass opacities compatible with developing pulmonary infarct. 4. Extensive coronary artery atherosclerosis. 5. Hepatic steatosis, cholelithiasis, and additional chronic findings above. I discussed critical findings with referring provider VIKI Giordano, at 11:39 a.m. on 6/20/2025. Electronically Signed: Francisco Holloway MD  6/20/2025 11:49 AM EDT  Workstation ID: [id number]    XR Chest 1 View  Result Date: 6/20/2025  Impression: Mild left lower lobe opacities with volume loss favoring atelectasis. Electronically Signed: Lee Huff MD  6/20/2025 10:13 AM EDT  Workstation ID: [id number]      Social issues:   Social History     Socioeconomic History    Marital status:    Tobacco Use    Smoking status: Never    Smokeless tobacco: Never   Vaping Use    Vaping status: Never Used   Substance and Sexual Activity    Alcohol use: Yes     Alcohol/week: 6.0 standard drinks of alcohol     Types: 6 Shots of liquor per week    Drug use: Not Currently    Sexual activity: Defer       NIH Stroke Scale:  Interval: (not recorded)  1a. Level of Consciousness: (not recorded)  1b. LOC Questions: (not recorded)  1c. LOC Commands: (not recorded)  2. Best Gaze: (not recorded)  3. Visual: (not recorded)  4. Facial Palsy: (not recorded)  5a. Motor Arm, Left: (not recorded)  5b. Motor Arm, Right: (not recorded)  6a. Motor Leg, Left: (not recorded)  6b. Motor Leg, Right: (not recorded)  7. Limb Ataxia: (not recorded)  8. Sensory: (not recorded)  9. Best Language: (not recorded)  10. Dysarthria: (not recorded)  11. Extinction and Inattention (formerly Neglect): (not recorded)    Total (NIH Stroke Scale): (not recorded)     Additional notable assessment information:     Nursing report ED to floor:  Heather Gomez RN   06/20/25 12:58 EDT

## 2025-06-20 NOTE — NURSING NOTE
RN assessed site at 1800 and noted a moderate hematoma. ARNP called to bedside. RN held manual pressure to site for 5 minutes. Hematoma sized decreased. RN reassessed site at 1820 and hematoma size had increased. ARNP notifed, sand bag being placed on patient. HOB is still flat. VSS. Pt reports no pain.

## 2025-06-21 ENCOUNTER — APPOINTMENT (OUTPATIENT)
Dept: CARDIOLOGY | Facility: HOSPITAL | Age: 80
End: 2025-06-21
Payer: MEDICARE

## 2025-06-21 LAB
ALBUMIN SERPL-MCNC: 3.7 G/DL (ref 3.5–5.2)
ALBUMIN/GLOB SERPL: 1.3 G/DL
ALP SERPL-CCNC: 85 U/L (ref 39–117)
ALT SERPL W P-5'-P-CCNC: 25 U/L (ref 1–41)
ANION GAP SERPL CALCULATED.3IONS-SCNC: 10.4 MMOL/L (ref 5–15)
APTT PPP: 82 SECONDS (ref 22.7–35.4)
AST SERPL-CCNC: 32 U/L (ref 1–40)
BASOPHILS # BLD AUTO: 0.05 10*3/MM3 (ref 0–0.2)
BASOPHILS NFR BLD AUTO: 0.4 % (ref 0–1.5)
BH CV RIGHT GROIN PSA PROCEDURE SCRIPTING LRR: 1
BH CV XLRA MEAS EXT ILIAC A PSV RIGHT: 112 CM/SEC
BILIRUB SERPL-MCNC: 0.8 MG/DL (ref 0–1.2)
BUN SERPL-MCNC: 18 MG/DL (ref 8–23)
BUN/CREAT SERPL: 12.9 (ref 7–25)
CALCIUM SPEC-SCNC: 9 MG/DL (ref 8.6–10.5)
CHLORIDE SERPL-SCNC: 104 MMOL/L (ref 98–107)
CHOLEST SERPL-MCNC: 199 MG/DL (ref 0–200)
CO2 SERPL-SCNC: 21.6 MMOL/L (ref 22–29)
CREAT SERPL-MCNC: 1.4 MG/DL (ref 0.76–1.27)
DEPRECATED RDW RBC AUTO: 44 FL (ref 37–54)
EGFRCR SERPLBLD CKD-EPI 2021: 51.1 ML/MIN/1.73
EOSINOPHIL # BLD AUTO: 0.05 10*3/MM3 (ref 0–0.4)
EOSINOPHIL NFR BLD AUTO: 0.4 % (ref 0.3–6.2)
ERYTHROCYTE [DISTWIDTH] IN BLOOD BY AUTOMATED COUNT: 12.8 % (ref 12.3–15.4)
GLOBULIN UR ELPH-MCNC: 2.9 GM/DL
GLUCOSE SERPL-MCNC: 118 MG/DL (ref 65–99)
HCT VFR BLD AUTO: 41.2 % (ref 37.5–51)
HDLC SERPL-MCNC: 49 MG/DL (ref 40–60)
HGB BLD-MCNC: 13.4 G/DL (ref 13–17.7)
IMM GRANULOCYTES # BLD AUTO: 0.07 10*3/MM3 (ref 0–0.05)
IMM GRANULOCYTES NFR BLD AUTO: 0.5 % (ref 0–0.5)
LDLC SERPL CALC-MCNC: 129 MG/DL (ref 0–100)
LDLC/HDLC SERPL: 2.58 {RATIO}
LYMPHOCYTES # BLD AUTO: 2.77 10*3/MM3 (ref 0.7–3.1)
LYMPHOCYTES NFR BLD AUTO: 21.2 % (ref 19.6–45.3)
MAGNESIUM SERPL-MCNC: 2.2 MG/DL (ref 1.6–2.4)
MCH RBC QN AUTO: 30.4 PG (ref 26.6–33)
MCHC RBC AUTO-ENTMCNC: 32.5 G/DL (ref 31.5–35.7)
MCV RBC AUTO: 93.4 FL (ref 79–97)
MONOCYTES # BLD AUTO: 0.83 10*3/MM3 (ref 0.1–0.9)
MONOCYTES NFR BLD AUTO: 6.3 % (ref 5–12)
NEUTROPHILS NFR BLD AUTO: 71.2 % (ref 42.7–76)
NEUTROPHILS NFR BLD AUTO: 9.31 10*3/MM3 (ref 1.7–7)
NRBC BLD AUTO-RTO: 0 /100 WBC (ref 0–0.2)
PHOSPHATE SERPL-MCNC: 3.3 MG/DL (ref 2.5–4.5)
PLATELET # BLD AUTO: 252 10*3/MM3 (ref 140–450)
PMV BLD AUTO: 8.6 FL (ref 6–12)
POTASSIUM SERPL-SCNC: 3.7 MMOL/L (ref 3.5–5.2)
POTASSIUM SERPL-SCNC: 3.9 MMOL/L (ref 3.5–5.2)
POTASSIUM SERPL-SCNC: 4.1 MMOL/L (ref 3.5–5.2)
PROT SERPL-MCNC: 6.6 G/DL (ref 6–8.5)
PROX PFA PSV RIGHT: 36.4 CM/SEC
PROX SFA PSV RIGHT: 76 CM/SEC
QT INTERVAL: 431 MS
QTC INTERVAL: 442 MS
RBC # BLD AUTO: 4.41 10*6/MM3 (ref 4.14–5.8)
RIGHT GROIN CFA SYS: 93.3 CM/SEC
SODIUM SERPL-SCNC: 136 MMOL/L (ref 136–145)
TRIGL SERPL-MCNC: 117 MG/DL (ref 0–150)
UFH PPP CHRO-ACNC: 0.16 IU/ML
UFH PPP CHRO-ACNC: 0.31 IU/ML
UFH PPP CHRO-ACNC: 0.35 IU/ML
VLDLC SERPL-MCNC: 21 MG/DL (ref 5–40)
WBC NRBC COR # BLD AUTO: 13.08 10*3/MM3 (ref 3.4–10.8)

## 2025-06-21 PROCEDURE — 85025 COMPLETE CBC W/AUTO DIFF WBC: CPT | Performed by: NURSE PRACTITIONER

## 2025-06-21 PROCEDURE — 84100 ASSAY OF PHOSPHORUS: CPT | Performed by: NURSE PRACTITIONER

## 2025-06-21 PROCEDURE — 80053 COMPREHEN METABOLIC PANEL: CPT | Performed by: NURSE PRACTITIONER

## 2025-06-21 PROCEDURE — 85730 THROMBOPLASTIN TIME PARTIAL: CPT | Performed by: INTERNAL MEDICINE

## 2025-06-21 PROCEDURE — 25010000002 ONDANSETRON PER 1 MG: Performed by: NURSE PRACTITIONER

## 2025-06-21 PROCEDURE — 84132 ASSAY OF SERUM POTASSIUM: CPT | Performed by: INTERNAL MEDICINE

## 2025-06-21 PROCEDURE — 85520 HEPARIN ASSAY: CPT | Performed by: INTERNAL MEDICINE

## 2025-06-21 PROCEDURE — 93005 ELECTROCARDIOGRAM TRACING: CPT | Performed by: INTERNAL MEDICINE

## 2025-06-21 PROCEDURE — 93926 LOWER EXTREMITY STUDY: CPT | Performed by: SURGERY

## 2025-06-21 PROCEDURE — 93010 ELECTROCARDIOGRAM REPORT: CPT | Performed by: INTERNAL MEDICINE

## 2025-06-21 PROCEDURE — 93926 LOWER EXTREMITY STUDY: CPT

## 2025-06-21 PROCEDURE — 80061 LIPID PANEL: CPT | Performed by: NURSE PRACTITIONER

## 2025-06-21 PROCEDURE — 85520 HEPARIN ASSAY: CPT | Performed by: STUDENT IN AN ORGANIZED HEALTH CARE EDUCATION/TRAINING PROGRAM

## 2025-06-21 PROCEDURE — 84132 ASSAY OF SERUM POTASSIUM: CPT | Performed by: STUDENT IN AN ORGANIZED HEALTH CARE EDUCATION/TRAINING PROGRAM

## 2025-06-21 PROCEDURE — 99232 SBSQ HOSP IP/OBS MODERATE 35: CPT | Performed by: INTERNAL MEDICINE

## 2025-06-21 PROCEDURE — 25010000002 HEPARIN (PORCINE) 25000-0.45 UT/250ML-% SOLUTION: Performed by: NURSE PRACTITIONER

## 2025-06-21 PROCEDURE — 83735 ASSAY OF MAGNESIUM: CPT | Performed by: NURSE PRACTITIONER

## 2025-06-21 RX ORDER — POTASSIUM CHLORIDE 1500 MG/1
20 TABLET, EXTENDED RELEASE ORAL ONCE
Status: COMPLETED | OUTPATIENT
Start: 2025-06-21 | End: 2025-06-21

## 2025-06-21 RX ADMIN — MEMANTINE HYDROCHLORIDE 5 MG: 10 TABLET, FILM COATED ORAL at 08:33

## 2025-06-21 RX ADMIN — MEMANTINE HYDROCHLORIDE 5 MG: 10 TABLET, FILM COATED ORAL at 20:56

## 2025-06-21 RX ADMIN — Medication 10 ML: at 08:34

## 2025-06-21 RX ADMIN — POTASSIUM CHLORIDE 20 MEQ: 1500 TABLET, EXTENDED RELEASE ORAL at 14:20

## 2025-06-21 RX ADMIN — HEPARIN SODIUM 13 UNITS/KG/HR: 10000 INJECTION, SOLUTION INTRAVENOUS at 22:27

## 2025-06-21 RX ADMIN — MUPIROCIN 1 APPLICATION: 20 OINTMENT TOPICAL at 20:56

## 2025-06-21 RX ADMIN — FINASTERIDE 5 MG: 5 TABLET, FILM COATED ORAL at 08:33

## 2025-06-21 RX ADMIN — DONEPEZIL HYDROCHLORIDE 5 MG: 5 TABLET, FILM COATED ORAL at 20:56

## 2025-06-21 RX ADMIN — POTASSIUM CHLORIDE 20 MEQ: 1500 TABLET, EXTENDED RELEASE ORAL at 06:06

## 2025-06-21 RX ADMIN — ONDANSETRON 4 MG: 2 INJECTION, SOLUTION INTRAMUSCULAR; INTRAVENOUS at 04:06

## 2025-06-21 RX ADMIN — HEPARIN SODIUM 10 UNITS/KG/HR: 10000 INJECTION, SOLUTION INTRAVENOUS at 04:17

## 2025-06-21 RX ADMIN — ESCITALOPRAM 10 MG: 10 TABLET, FILM COATED ORAL at 08:34

## 2025-06-21 RX ADMIN — MUPIROCIN 1 APPLICATION: 20 OINTMENT TOPICAL at 08:33

## 2025-06-21 RX ADMIN — PANTOPRAZOLE SODIUM 40 MG: 40 TABLET, DELAYED RELEASE ORAL at 06:07

## 2025-06-21 RX ADMIN — Medication 10 ML: at 20:55

## 2025-06-21 RX ADMIN — DONEPEZIL HYDROCHLORIDE 5 MG: 5 TABLET, FILM COATED ORAL at 08:33

## 2025-06-21 NOTE — PROGRESS NOTES
"Critical Care Progress Note     Mango Ling : 1945 MRN:[id number] LOS:1  Rm: 2304/1     Principal Problem: Acute saddle pulmonary embolism with acute cor pulmonale     Reason for follow up: All the medical problems listed below    Summary     Mango Ling is a 79 y.o. male with PMH of Alzheimer's disease, anxiety/depression, OAB/BPH, glaucoma, GERD, AGUILA, Obesity, and presented to the hospital for left lower chest pain, shortness of breath, diaphoresis with exertion, and was admitted with a principal diagnosis of Acute saddle pulmonary embolism with acute cor pulmonale.  Patient spouse states that patient has been having worsening shortness of breath over the past month, but it has severely worsened over the past few days.  His new onset chest pain started within the past 24 hours.  It was this new onset chest pain that prompted him to come in for evaluation.  Patient denies any long trips in the car or airplane, recent surgeries, prior DVTs or PEs, familial history of blood clotting.     In the ED, labs were obtained with the following notable labs: Troponin 25 with reflex of 23, creatinine 1.4, D-dimer 4.45, INR 1.11, WBC 10.85 without bandemia.  UA was with 6-10 WBC, trace leukocytes, without any evidence of dysuria, though a reflex urine culture is in process.  Chest x-ray with mild left lower lobe opacities and volume loss favoring atelectasis.  CT PE protocol was with the following impression: \"1. Extensive bilateral pulmonary emboli with saddle pulmonary embolus and emboli in the left and right main pulmonary arteries extending into multiple lobar and segmental branches bilaterally. 2. Interventricular septum mildly deviated to left with RV to LV ratio measuring 1.2 compatible with right heart strain. 3. Mild left lower lobe peripheral groundglass opacities compatible with developing pulmonary infarct. 4. Extensive coronary artery atherosclerosis. 5. Hepatic steatosis, cholelithiasis, and " "additional chronic findings above.\"  Cardiology was consulted.  Patient had venous duplexes and echocardiogram ordered as per cardiology recommendation.  Patient is planned to be admitted to the ICU after being started on a heparin drip, with planned thrombectomy this afternoon.  Intensivist service was consulted to admit patient.     ACP: In the event that patient is able to make his own decisions, his spouse decision maker.  No advance care plan documentation on file.       Patient is on Hospital Day: 2.    Significant Events / Subjective     06/21/25 : The patient reports he feels well today and is anxious to go home.  He is tolerating room air and denies shortness of air.  He remains on a heparin drip with plans to transition to Eliquis, possibly later today.  Right groin with significant ecchymosis but no palpable hematoma.  The patient is stable for transfer out of the ICU    Assessment / Plan     Acute saddle pulmonary embolism with acute cor pulmonale  Possible pulmonary infarct of left lower lobe  Acute left lower extremity DVT in the distal femoral and popliteal vein  At this time, felt to be an unprovoked DVT/PE; patient is relatively sedentary due to bilateral lower extremity weakness per his spouse.  Radiology studies:  CT PE Protocol (6/20): Extensive bilateral pulmonary emboli with saddle pulmonary embolism and emboli in the left and right main pulmonary arteries extending into multiple lobar and segmental branches bilaterally, interventricular septum mildly deviated to the left with RV to LV ratio measuring 1.2 compatible with right heart strain, mild left lower lobe peripheral groundglass opacities compatible with developing pulmonary infarct.  Bilateral lower extremity duplex: Acute left lower extremity DVT in the distal femoral and popliteal veins.  Cardiology consulted, patient underwent Inari 6/20/2025  Started on heparin gtt in ED. Continue heparin gtt, plan transition to oral anticoagulant " today  Pharmacy ran DOACs on insurance to help determine affordability for selection of discharge plan.  Transition to Eliquis     Essential hypertension: well controlled.   Home regimen of losartan .   Titrate medications as needed.     CKD  Remains nonoliguric. Baseline creatinine 1.42, 1.40 on admission.  Monitor Input/Output very closely.   Avoids NSAIDs, nephrotoxic medications, and hypotension.  Monitor and trend labs.     Alzheimer's dementia: Continue Aricept, Namenda.  Anxiety/Depression: Continue Lexapro.  OAB: On Gemtesa as OP.  BPH: Continue Proscar.  Glaucoma: On timolol.  GERD: Continue PPI.  AGUILA  Obesity: Body mass index is 35.73 kg/m².       Disposition: PCU    Code status:   Code Status (Patient has no pulse and is not breathing): CPR (Attempt to Resuscitate)  Medical Interventions (Patient has pulse or is breathing): Full Support       Nutrition:   Diet: Cardiac; Healthy Heart (2-3 Na+); Fluid Consistency: Thin (IDDSI 0)   Patient isn't on Tube Feeding     VTE Prophylaxis:  Pharmacologic VTE prophylaxis orders are present.           Objective / Physical Exam     Vital signs:  Temp: 98.5 °F (36.9 °C)  BP: 146/80  Heart Rate: 69  Resp: 15  SpO2: 96 %  Weight: 113 kg (249 lb)    Admission Weight: Weight: 112 kg (246 lb 14.6 oz)  Current Weight: Weight: 113 kg (249 lb)    Input/Output in last 24 hours:    Intake/Output Summary (Last 24 hours) at 6/21/2025 1103  Last data filed at 6/21/2025 0800  Gross per 24 hour   Intake 1360 ml   Output 725 ml   Net 635 ml      Net IO Since Admission: 635 mL [06/21/25 1103]     Physical Exam  Vitals and nursing note reviewed.   Constitutional:       General: He is not in acute distress.     Appearance: Normal appearance.   HENT:      Head: Normocephalic and atraumatic.      Right Ear: External ear normal.      Left Ear: External ear normal.   Eyes:      General: No scleral icterus.     Conjunctiva/sclera: Conjunctivae normal.   Cardiovascular:      Heart sounds:  Normal heart sounds, S1 normal and S2 normal. No murmur heard.     Comments: Sinus rhythm  Pulmonary:      Breath sounds: Normal breath sounds. No wheezing or rhonchi.   Abdominal:      General: There is no distension.      Palpations: Abdomen is soft.      Tenderness: There is no abdominal tenderness.   Musculoskeletal:      Cervical back: Neck supple. No rigidity.      Right lower leg: Edema present.      Left lower leg: Edema present.   Skin:     General: Skin is warm and dry.      Comments: Right groin with extensive ecchymosis, no palpable hematoma   Neurological:      Mental Status: He is alert.          Radiology and Labs     Results from last 7 days   Lab Units 06/21/25  0407 06/20/25  2147 06/20/25  1015   WBC 10*3/mm3 13.08* 10.89* 10.85*   HEMOGLOBIN g/dL 13.4 12.0* 13.7   HEMATOCRIT % 41.2 36.6* 42.3   PLATELETS 10*3/mm3 252 197 243      Results from last 7 days   Lab Units 06/20/25  1015   PROTIME Seconds 14.3*   INR  1.11*   APTT seconds 29.9      Results from last 7 days   Lab Units 06/21/25  0407 06/20/25  1015   SODIUM mmol/L 136 140   POTASSIUM mmol/L 3.9 3.9   CHLORIDE mmol/L 104 104   CO2 mmol/L 21.6* 24.2   ANION GAP mmol/L 10.4 11.8   BUN mg/dL 18.0 16.0   CREATININE mg/dL 1.40* 1.40*   GLUCOSE mg/dL 118* 111*   PHOSPHORUS mg/dL 3.3  --    MAGNESIUM mg/dL 2.2  --    ALT (SGPT) U/L 25 30   AST (SGOT) U/L 32 30   ALK PHOS U/L 85 90      Results from last 7 days   Lab Units 06/21/25  0407 06/20/25  1015   ALT (SGPT) U/L 25 30   AST (SGOT) U/L 32 30   ALK PHOS U/L 85 90           CT Angiogram Chest Pulmonary Embolism  Result Date: 6/20/2025  Impression: 1. Extensive bilateral pulmonary emboli with saddle pulmonary embolus and emboli in the left and right main pulmonary arteries extending into multiple lobar and segmental branches bilaterally. 2. Interventricular septum mildly deviated to left with RV to LV ratio measuring 1.2 compatible with right heart strain. 3. Mild left lower lobe peripheral  groundglass opacities compatible with developing pulmonary infarct. 4. Extensive coronary artery atherosclerosis. 5. Hepatic steatosis, cholelithiasis, and additional chronic findings above. I discussed critical findings with referring provider VIKI Giordano, at 11:39 a.m. on 6/20/2025. Electronically Signed: Francisco Holloway MD  6/20/2025 11:49 AM EDT  Workstation ID: [id number]    XR Chest 1 View  Result Date: 6/20/2025  Impression: Mild left lower lobe opacities with volume loss favoring atelectasis. Electronically Signed: Lee Huff MD  6/20/2025 10:13 AM EDT  Workstation ID: [id number]      Current medications     Scheduled Meds:   donepezil, 5 mg, Oral, BID  escitalopram, 10 mg, Oral, Daily  finasteride, 5 mg, Oral, Daily  [Held by provider] losartan, 100 mg, Oral, Q24H  memantine, 5 mg, Oral, Q12H  mupirocin, 1 Application, Each Nare, BID  pantoprazole, 40 mg, Oral, Q AM  sodium chloride, 10 mL, Intravenous, Q12H        Continuous Infusions:   heparin, 13.4 Units/kg/hr, Last Rate: 13 Units/kg/hr (06/21/25 0502)  sodium chloride, 50 mL/hr, Last Rate: 50 mL/hr (06/20/25 1706)          Plan discussed with RN. Reviewed all other data in the last 24 hours, including but not limited to vitals, labs, microbiology, imaging and pertinent notes from other providers.        VIKI Farooq   Critical Care  06/21/25   11:03 EDT

## 2025-06-21 NOTE — PROGRESS NOTES
Cardiology Progress Note    Patient Identification:  Name: Mango Ling  Age: 79 y.o.  Sex: male  :  1945  MRN: [id number]                 Follow Up / Chief Complaint: PE  Chief Complaint   Patient presents with    Shortness of Breath       Interval History: Patient presented with shortness of breath was found to have bilateral saddle PE with cor pulmonale.  Underwent [facility] PE declot 2025.  Patient denies any chest pain or shortness of breath       Subjective: Patient seen and examined.  Chart reviewed.  Labs reviewed.  Discussed with RN taking care of patient.      Objective:  2025: Potassium was 3.9 and 3.7.  Glucose 118    History of present illness:      Mr. Mango Ling has PMH of     Hypertension  AGUILA  Kidney stone  Alzheimer's     Presented with complaint of worsening shortness of breath was found to have bilateral PE therefore interventional cardiology was consulted.  Patient has shortness of breath for over a year and decreased activity level and chronic pain.     Data:  2025: HS troponin is 25 and 23.  proBNP normal at 135.  CMP with a creatinine of 1.4, EGFR 51, glucose 111.  CBC with a white count of 10.8, D-dimer 4.45  CT PE study 2025: Reviewed/interpreted by me: Saddle PE and emboli in left and right main arteries extending into multiple lobar and subsegmental branches bilaterally.  RV to LV ratio is 1.2 compatible with RV strain.  Mild left lower lobe groundglass opacity compatible with developing pulmonary infarct.  Hepatic steatosis and coronary atherosclerosis  EKG 2025 reviewed/interpreted by me reveals sinus rhythm with rate of 63 bpm  Echocardiogram 2025 reviewed/interpreted by me reveals normal LV size and function EF of 5660%.  Mildly reduced RV function and mild RV enlargement and moderate mitral valve thickening        Assessment:  :     Acute saddle PE with cor pulmonale  DVT  Hypertension  Obesity with AGUILA  Hyperglycemia  Renal insufficiency         Recommendations / Plan:            Patient presented with shortness of breath  Was diagnosed with acute bilateral saddle PE with RV strain  Went to Cath Lab emergently for renal artery PAD clot  Patient is on IV heparin  I had started patient on Eliquis post cath.  Which has been held.  Will defer anticoagulation to primary team.  EKG done 6/21/2025 reviewed/interpreted by me reveals sinus rhythm with rate of 65 bpm.  Continue losartan for blood pressure  Monitor renal function.      Copied text in this portion of the note has been reviewed and is accurate as of 6/21/2025    Past Medical History:  Past Medical History:   Diagnosis Date    Alzheimer disease     Bladder stones     Hypertension     Sleep apnea     CPAP     Past Surgical History:  Past Surgical History:   Procedure Laterality Date    BACK SURGERY      lower back    CYSTOSCOPY LITHOLAPAXY BLADDER STONE EXTRACTION N/A 10/30/2023    Procedure: CYSTOSCOPY LITHOLAPAXY BLADDER STONE EXTRACTION;  Surgeon: Ryan Romero MD;  Location: [facility];  Service: Urology;  Laterality: N/A;    CYSTOSCOPY TRANSURETHRAL RESECTION OF PROSTATE N/A 10/30/2023    Procedure: CYSTOSCOPY TRANSURETHRAL RESECTION OF PROSTATE;  Surgeon: Ryan Romero MD;  Location: [facility];  Service: Urology;  Laterality: N/A;    CYSTOSCOPY W/ LASER LITHOTRIPSY      TOTAL SHOULDER REPLACEMENT Right         Social History:   Social History     Tobacco Use    Smoking status: Never    Smokeless tobacco: Never   Substance Use Topics    Alcohol use: Yes     Alcohol/week: 6.0 standard drinks of alcohol     Types: 6 Shots of liquor per week      Family History:  Family History   Problem Relation Age of Onset    Alzheimer's disease Father           Allergies:  No Known Allergies  Scheduled Meds:  donepezil, 5 mg, BID  escitalopram, 10 mg, Daily  finasteride, 5 mg, Daily  [Held by provider] losartan, 100 mg, Q24H  memantine, 5 mg, Q12H  mupirocin, 1 Application, BID  pantoprazole, 40 mg,  "Q AM  sodium chloride, 10 mL, Q12H          Review of Systems:   ROS  Review of Systems   Constitution: Negative for chills and fever.   Cardiovascular: Negative for chest pain and palpitations.   Respiratory: Negative for cough and hemoptysis.    Gastrointestinal: Negative for nausea.        Constitutional:  Temp:  [97.7 °F (36.5 °C)-98.5 °F (36.9 °C)] 98.2 °F (36.8 °C)  Heart Rate:  [55-76] 63  Resp:  [14-16] 15  BP: (114-146)/(51-84) 123/65    Physical Exam   /65   Pulse 63   Temp 98.2 °F (36.8 °C) (Oral)   Resp 15   Ht 177.8 cm (70\")   Wt 113 kg (249 lb)   SpO2 94%   BMI 35.73 kg/m²   General:  Appears in no acute distress  Eyes: Sclera is anicteric,  conjunctiva is clear   HEENT:  No JVD. Thyroid not visibly enlarged. No mucosal pallor or cyanosis  Respiratory: Respirations regular and unlabored at rest.  Clear to auscultation  Cardiovascular: S1,S2 Regular rate and rhythm. No murmur, rub or gallop auscultated.  . No pretibial pitting edema  Gastrointestinal: Abdomen nondistended.  Musculoskeletal:  No abnormal movements  Extremities: No digital clubbing or cyanosis  Skin: Color pink.   Neuro: Alert and awake.    INTAKE AND OUTPUT:    Intake/Output Summary (Last 24 hours) at 6/21/2025 1859  Last data filed at 6/21/2025 1600  Gross per 24 hour   Intake 1800 ml   Output 725 ml   Net 1075 ml       Cardiographics  Telemetry: Reviewed and interpreted by me reveals sinus    ECG:   ECG 12 Lead Rhythm Change   Preliminary Result   HEART RATE=65  bpm   RR Jxaxppwe=756  ms   NV Qqeomryb=467  ms   P Horizontal Axis=-7  deg   P Front Axis=51  deg   QRSD Interval=96  ms   QT Adfmlhgs=895  ms   OUeI=352  ms   QRS Axis=23  deg   T Wave Axis=-19  deg   - NORMAL ECG -   Sinus rhythm   Date and Time of Study:2025-06-21 08:39:54      ECG 12 Lead Dyspnea   Final Result   HEART RATE=63  bpm   RR Rtexeacc=157  ms   NV Bjzkquli=965  ms   P Horizontal Axis=9  deg   P Front Axis=39  deg   QRSD Interval=94  ms   QT " Eiszsiov=951  ms   AJqA=696  ms   QRS Axis=25  deg   T Wave Axis=41  deg   - NORMAL ECG -   Sinus rhythm   When compared with ECG of 19-Oct-2023 10:25:59,   No significant change   Electronically Signed By: Mango Joseph (CHYNA) 2025-06-21 09:20:35   Date and Time of Study:2025-06-20 09:49:05        I have personally reviewed EKG    Echocardiogram: Results for orders placed during the hospital encounter of 06/20/25    ADULT TRANSTHORACIC ECHO COMPLETE W/ CONT IF NECESSARY PER PROTOCOL    Interpretation Summary    Left ventricular ejection fraction appears to be 56 - 60%.    Mildly reduced right ventricular systolic function noted.    The right ventricular cavity is mildly dilated.    There is moderate, bileaflet mitral valve thickening present.      STRESS TEST      HEART CATHETERIZATION  Results for orders placed during the hospital encounter of 06/20/25    Cardiac Catheterization/Vascular Study    Conclusion  PULMONARY ANGIOGRAPHY AND MECHANICAL/[facility] THROMBECTOMY REPORT:    Date of procedure: 6/20/2025    Indication:  Submassive pulmonary embolus, with RV enlargement, elevated troponin and lower extremity DVT    Procedure performed:  -Cath placement in IVC  -Right heart cath 34446  -Selective cath placement in left PA  -Selective cath placement in the right PA  -Extirpation of matter from bilateral PA via mechanical thrombectomy 37184 x 2  -Selective left pulmonary artery angiogram 04382  -Selective right pulmonary artery angiogram 00413  -Catheter placement and angiography and [place]irpation [place] of in [address] left upper lobe apical posterior segment branch , left lower lateral segment branch, left lower lobe posterior basal segment branch.  -Catheter placement and angiographyand extirpation of matter of in right lower lobe anterior basal segment, right interlobar PA, truncus anterior, right upper lobe apical segment branch  Right and left pulmonary artery thrombectomy.    Description of procedure:    Risk benefits  alternatives of the procedure were explained to the patient in CCU at length and answered all his questions.  The patient was brought to the cath lab.   Patient was prepped and draped in the usual manner.  Timeout was done.  Lidocaine was infused in the right groin for anesthesia. Access was obtained in the right femoral vein using a micropuncture technique. A 035 wire and 6 [place] sheath was placed.  Then the sheath was dilated up to 24 [place]  sheath.  This was placed in IVC..  6 [place] 035 [place] was advanced into left PA and a Amplatz superstiff wire was placed in left lower lobe posterior basal segment branch.  Then a 24 sheath Triever was advanced into the left main PA.  Angiography was obtained.  Then mechanical aspiration suction thrombectomy/extirpation of matter was performed in the left upper lobe apical posterior segment branch left lower lobe lateral segment branch and left lower lobe posterior basal segment branch, with the help of triever 20 curve sheath to direct into each 1 of these branches.  Then post angiography was done to make sure clot was resolved.  Then the retriever 24 sheath was directed towards the right PA with the help of a JR4 diagnostic catheter and 035 wire.  Angiography was obtained in the main PA and branches individually.  Mechanical aspiration suction thrombectomy/extubation of matter was performed right lower lobe anterior basal segment, right interlobar lower pulmonary artery, truncus anterior, right upper lobe apical segment branch.  Then post angiography was performed to make sure clot was resolved.    Significant amount of thrombus was retrieved from both left and right side.  Patient's oxygenation improved significantly post procedure compared to preprocedure.  Heart rate also decreased and improved post procedure.    PA pressures were measured after the procedure and improved.    Estimated blood loss was 80 cc    Intravenous heparin was used as anticoagulation and  "therapeutic ACT's were maintained throughout the procedure.  Postprocedure the sheath was removed and figure-of-eight suture was applied to subcutaneous tissue for hemostasis.  The suture will be removed in 2 hours    Patient tolerated the procedure well.  Immediate complications were none.        Hemodynamics:    1.  Baseline PA pressures : Patient's baseline zeroing was not accurate since the transducer connection was loose.  22/3    2.  POST THROMBECTOMY PA : 37/7    Conclusion: Bilateral pulmonary emboli.  Technically successful bilateral pulmonary artery thrombectomy/extirpation of matter.    Recommendation:  Patient was given intravenous heparin during the procedure to keep ACT is over 300.  We will stop IV heparin  Will start on Eliquis DVT PE protocol      Lab Review   I have reviewed the labs  Results from last 7 days   Lab Units 06/20/25  1145 06/20/25  1015   HSTROP T ng/L 23* 25*     Results from last 7 days   Lab Units 06/21/25  0407   MAGNESIUM mg/dL 2.2     Results from last 7 days   Lab Units 06/21/25  1816 06/21/25  1153 06/21/25  0407   SODIUM mmol/L  --   --  136   POTASSIUM mmol/L 4.1   < > 3.9   BUN mg/dL  --   --  18.0   CREATININE mg/dL  --   --  1.40*   CALCIUM mg/dL  --   --  9.0    < > = values in this interval not displayed.         Results from last 7 days   Lab Units 06/21/25  0407 06/20/25  2147 06/20/25  1015   WBC 10*3/mm3 13.08* 10.89* 10.85*   HEMOGLOBIN g/dL 13.4 12.0* 13.7   HEMATOCRIT % 41.2 36.6* 42.3   PLATELETS 10*3/mm3 252 197 243     Results from last 7 days   Lab Units 06/21/25  1816 06/20/25  1015   INR   --  1.11*   APTT seconds 82.0* 29.9       RADIOLOGY:  Imaging Results (Last 24 Hours)       ** No results found for the last 24 hours. **                  )6/21/2025  Von Buchanan MD      EMR Dragon/Transcription:   \"Dictated utilizing Dragon dictation\".   "

## 2025-06-21 NOTE — CONSULTS
"    [facility] MEDICINE SERVICE  TRANSFER OF CARE/ACCEPTANCE NOTE    PATIENT NAME: Mango Ling  : 1945  MRN: [id number]     Active Hospital Problems    Diagnosis  POA    **Acute saddle pulmonary embolism with acute cor pulmonale [I26.02]  Unknown      Resolved Hospital Problems   No resolved problems to display.       Patient seen and examined by me on 25 at 1:05 PM EDT .  Interim History: 79 y.o. male with PMH of Alzheimer's disease, anxiety/depression, OAB/BPH, glaucoma, GERD, AGUILA, Obesity, and presented to the hospital for left lower chest pain, shortness of breath, diaphoresis with exertion, and was admitted with a principal diagnosis of Acute saddle pulmonary embolism with acute cor pulmonale.  Patient spouse states that patient has been having worsening shortness of breath over the past month, but it has severely worsened over the past few days.  His new onset chest pain started within the past 24 hours.  It was this new onset chest pain that prompted him to come in for evaluation.  Patient denies any long trips in the car or airplane, recent surgeries, prior DVTs or PEs, familial history of blood clotting.     In the ED, labs were obtained with the following notable labs: Troponin 25 with reflex of 23, creatinine 1.4, D-dimer 4.45, INR 1.11, WBC 10.85 without bandemia.  UA was with 6-10 WBC, trace leukocytes, without any evidence of dysuria, though a reflex urine culture is in process.  Chest x-ray with mild left lower lobe opacities and volume loss favoring atelectasis.  CT PE protocol was with the following impression: \"1. Extensive bilateral pulmonary emboli with saddle pulmonary embolus and emboli in the left and right main pulmonary arteries extending into multiple lobar and segmental branches bilaterally. 2. Interventricular septum mildly deviated to left with RV to LV ratio measuring 1.2 compatible with right heart strain. 3. Mild left lower lobe peripheral groundglass opacities " "compatible with developing pulmonary infarct. 4. Extensive coronary artery atherosclerosis. 5. Hepatic steatosis, cholelithiasis, and additional chronic findings above.\"    Patient reports patient sitting up eating lunch, denies any chest pain, states improved work of breathing, denies any shortness of air.    I have noted the following changes since admission: Patient underwent pulmonary thrombectomy on 6/20/2025.  Post thrombectomy hematoma developed to right groin.  Patient is currently on heparin drip for the treatment of that and will transition to Eliquis later on today or in the morning.  Patient is hemodynamically stable.  Vital signs at time of transfer blood pressure 146/80, heart rate 69, temp 98.5, respiratory rate 15, on 3 L nasal cannula.    I have reviewed the H&P, diagnostic data and plan of care for Mango Ling.  I will be taking over care of this patient during the current hospitalization.        Signature: Electronically signed by VIKI Marquez, 06/21/25, 14:05 EDT.  [facility] Hospitalist Team         "

## 2025-06-21 NOTE — PLAN OF CARE
Goal Outcome Evaluation:              Outcome Evaluation: Patient with Saddle Pulmonary embolism retrieved in cath lab yesterday. Hematoma & bleeding at insertion sight (right groin). Now greatly improved. Patient off bedrest, on room air.

## 2025-06-21 NOTE — NURSING NOTE
Per handoff report, patient's cath insertion site had begun bleeding around 17:30. Per day RN, Intensivist encouraged tightening of closure device, manual pressure, followed by continuous sandbag/saline bag pressure.    At change of shift, hematoma had increased and site had begun to ooze blood again. Manual pressure was again applied. Cardiology called.  Call taken by doctor Arpita. MD stated to stop heparin (already off due to supratherapeutic lab value at 1900, remove closure device and use Femostop device to applied consistent pressure.     Intensivist has ordered ultrasound to ensure this is not developing pseudoaneurysm.

## 2025-06-21 NOTE — NURSING NOTE
Per discussion with ALIZA Cuello, heparin drip will be restarted at this time (heparin anti XA result therapeutic at 21:47). Dose will be at 10 units per kg.

## 2025-06-21 NOTE — PLAN OF CARE
Goal Outcome Evaluation:  Plan of Care Reviewed With: patient, spouse        Progress: improving        Pt downgraded today. Hep gtt infusing. EPIC sitter initiated, patient requiring frequent verbal redirection.

## 2025-06-22 LAB
ALBUMIN SERPL-MCNC: 3.3 G/DL (ref 3.5–5.2)
ALBUMIN/GLOB SERPL: 1.3 G/DL
ALP SERPL-CCNC: 71 U/L (ref 39–117)
ALT SERPL W P-5'-P-CCNC: 21 U/L (ref 1–41)
ANION GAP SERPL CALCULATED.3IONS-SCNC: 10 MMOL/L (ref 5–15)
AST SERPL-CCNC: 26 U/L (ref 1–40)
BACTERIA SPEC AEROBE CULT: ABNORMAL
BASOPHILS # BLD AUTO: 0.02 10*3/MM3 (ref 0–0.2)
BASOPHILS NFR BLD AUTO: 0.2 % (ref 0–1.5)
BILIRUB SERPL-MCNC: 0.5 MG/DL (ref 0–1.2)
BUN SERPL-MCNC: 16.3 MG/DL (ref 8–23)
BUN/CREAT SERPL: 13.1 (ref 7–25)
CALCIUM SPEC-SCNC: 8.5 MG/DL (ref 8.6–10.5)
CHLORIDE SERPL-SCNC: 107 MMOL/L (ref 98–107)
CO2 SERPL-SCNC: 22 MMOL/L (ref 22–29)
CREAT SERPL-MCNC: 1.24 MG/DL (ref 0.76–1.27)
DEPRECATED RDW RBC AUTO: 44.8 FL (ref 37–54)
EGFRCR SERPLBLD CKD-EPI 2021: 59.1 ML/MIN/1.73
EOSINOPHIL # BLD AUTO: 0.11 10*3/MM3 (ref 0–0.4)
EOSINOPHIL NFR BLD AUTO: 1 % (ref 0.3–6.2)
ERYTHROCYTE [DISTWIDTH] IN BLOOD BY AUTOMATED COUNT: 13.1 % (ref 12.3–15.4)
GLOBULIN UR ELPH-MCNC: 2.5 GM/DL
GLUCOSE SERPL-MCNC: 108 MG/DL (ref 65–99)
HCT VFR BLD AUTO: 33.1 % (ref 37.5–51)
HGB BLD-MCNC: 10.7 G/DL (ref 13–17.7)
IMM GRANULOCYTES # BLD AUTO: 0.04 10*3/MM3 (ref 0–0.05)
IMM GRANULOCYTES NFR BLD AUTO: 0.4 % (ref 0–0.5)
LYMPHOCYTES # BLD AUTO: 2.78 10*3/MM3 (ref 0.7–3.1)
LYMPHOCYTES NFR BLD AUTO: 25.4 % (ref 19.6–45.3)
MAGNESIUM SERPL-MCNC: 2 MG/DL (ref 1.6–2.4)
MCH RBC QN AUTO: 30.2 PG (ref 26.6–33)
MCHC RBC AUTO-ENTMCNC: 32.3 G/DL (ref 31.5–35.7)
MCV RBC AUTO: 93.5 FL (ref 79–97)
MONOCYTES # BLD AUTO: 0.74 10*3/MM3 (ref 0.1–0.9)
MONOCYTES NFR BLD AUTO: 6.8 % (ref 5–12)
NEUTROPHILS NFR BLD AUTO: 66.2 % (ref 42.7–76)
NEUTROPHILS NFR BLD AUTO: 7.24 10*3/MM3 (ref 1.7–7)
NRBC BLD AUTO-RTO: 0 /100 WBC (ref 0–0.2)
PHOSPHATE SERPL-MCNC: 2.4 MG/DL (ref 2.5–4.5)
PLATELET # BLD AUTO: 205 10*3/MM3 (ref 140–450)
PMV BLD AUTO: 8.4 FL (ref 6–12)
POTASSIUM SERPL-SCNC: 3.8 MMOL/L (ref 3.5–5.2)
POTASSIUM SERPL-SCNC: 4.2 MMOL/L (ref 3.5–5.2)
PROT SERPL-MCNC: 5.8 G/DL (ref 6–8.5)
QT INTERVAL: 431 MS
QTC INTERVAL: 447 MS
RBC # BLD AUTO: 3.54 10*6/MM3 (ref 4.14–5.8)
SODIUM SERPL-SCNC: 139 MMOL/L (ref 136–145)
UFH PPP CHRO-ACNC: 0.47 IU/ML
WBC NRBC COR # BLD AUTO: 10.93 10*3/MM3 (ref 3.4–10.8)

## 2025-06-22 PROCEDURE — 83735 ASSAY OF MAGNESIUM: CPT | Performed by: NURSE PRACTITIONER

## 2025-06-22 PROCEDURE — 84132 ASSAY OF SERUM POTASSIUM: CPT | Performed by: HOSPITALIST

## 2025-06-22 PROCEDURE — 99232 SBSQ HOSP IP/OBS MODERATE 35: CPT | Performed by: INTERNAL MEDICINE

## 2025-06-22 PROCEDURE — 80053 COMPREHEN METABOLIC PANEL: CPT | Performed by: NURSE PRACTITIONER

## 2025-06-22 PROCEDURE — 85025 COMPLETE CBC W/AUTO DIFF WBC: CPT | Performed by: INTERNAL MEDICINE

## 2025-06-22 PROCEDURE — 84100 ASSAY OF PHOSPHORUS: CPT | Performed by: NURSE PRACTITIONER

## 2025-06-22 PROCEDURE — 85520 HEPARIN ASSAY: CPT | Performed by: INTERNAL MEDICINE

## 2025-06-22 RX ORDER — POTASSIUM CHLORIDE 1500 MG/1
20 TABLET, EXTENDED RELEASE ORAL ONCE
Status: COMPLETED | OUTPATIENT
Start: 2025-06-22 | End: 2025-06-22

## 2025-06-22 RX ADMIN — APIXABAN 10 MG: 5 TABLET, FILM COATED ORAL at 14:29

## 2025-06-22 RX ADMIN — MEMANTINE HYDROCHLORIDE 5 MG: 10 TABLET, FILM COATED ORAL at 08:48

## 2025-06-22 RX ADMIN — ESCITALOPRAM 10 MG: 10 TABLET, FILM COATED ORAL at 08:48

## 2025-06-22 RX ADMIN — FINASTERIDE 5 MG: 5 TABLET, FILM COATED ORAL at 08:48

## 2025-06-22 RX ADMIN — Medication 10 ML: at 09:42

## 2025-06-22 RX ADMIN — MEMANTINE HYDROCHLORIDE 5 MG: 10 TABLET, FILM COATED ORAL at 20:00

## 2025-06-22 RX ADMIN — PANTOPRAZOLE SODIUM 40 MG: 40 TABLET, DELAYED RELEASE ORAL at 05:51

## 2025-06-22 RX ADMIN — DONEPEZIL HYDROCHLORIDE 5 MG: 5 TABLET, FILM COATED ORAL at 20:01

## 2025-06-22 RX ADMIN — POTASSIUM CHLORIDE 20 MEQ: 1500 TABLET, EXTENDED RELEASE ORAL at 05:51

## 2025-06-22 RX ADMIN — DOCUSATE SODIUM 50 MG AND SENNOSIDES 8.6 MG 2 TABLET: 8.6; 5 TABLET, FILM COATED ORAL at 18:31

## 2025-06-22 RX ADMIN — BISACODYL 5 MG: 5 TABLET, COATED ORAL at 20:01

## 2025-06-22 RX ADMIN — APIXABAN 10 MG: 5 TABLET, FILM COATED ORAL at 20:01

## 2025-06-22 RX ADMIN — MUPIROCIN 1 APPLICATION: 20 OINTMENT TOPICAL at 20:01

## 2025-06-22 RX ADMIN — Medication 10 ML: at 20:01

## 2025-06-22 RX ADMIN — DONEPEZIL HYDROCHLORIDE 5 MG: 5 TABLET, FILM COATED ORAL at 08:48

## 2025-06-22 NOTE — PLAN OF CARE
Problem: Adult Inpatient Plan of Care  Goal: Plan of Care Review  Outcome: Progressing  Flowsheets (Taken 6/22/2025 0659)  Progress: improving  Plan of Care Reviewed With: patient  Goal: Patient-Specific Goal (Individualized)  Outcome: Progressing  Goal: Absence of Hospital-Acquired Illness or Injury  Outcome: Progressing  Intervention: Identify and Manage Fall Risk  Recent Flowsheet Documentation  Taken 6/22/2025 0600 by Jose Manuel العراقي, RN  Safety Promotion/Fall Prevention:   safety round/check completed   room organization consistent   nonskid shoes/slippers when out of bed   clutter free environment maintained   assistive device/personal items within reach   fall prevention program maintained  Taken 6/22/2025 0501 by Jose Manuel العراقي, RN  Safety Promotion/Fall Prevention:   safety round/check completed   room organization consistent   nonskid shoes/slippers when out of bed   clutter free environment maintained   assistive device/personal items within reach   fall prevention program maintained  Taken 6/22/2025 0400 by Jose Manuel العراقي, RN  Safety Promotion/Fall Prevention:   safety round/check completed   room organization consistent   nonskid shoes/slippers when out of bed   clutter free environment maintained   assistive device/personal items within reach   fall prevention program maintained  Taken 6/22/2025 0300 by Jose Manuel العراقي, RN  Safety Promotion/Fall Prevention:   safety round/check completed   room organization consistent   nonskid shoes/slippers when out of bed   clutter free environment maintained   assistive device/personal items within reach   fall prevention program maintained  Taken 6/22/2025 0200 by Jose Manuel العراقي, RN  Safety Promotion/Fall Prevention:   safety round/check completed   room organization consistent   nonskid shoes/slippers when out of bed   clutter free environment maintained   assistive device/personal items within reach   fall prevention program maintained  Taken  6/22/2025 0100 by Jose Manuel العراقي RN  Safety Promotion/Fall Prevention:   safety round/check completed   room organization consistent   nonskid shoes/slippers when out of bed   clutter free environment maintained   assistive device/personal items within reach   fall prevention program maintained  Taken 6/22/2025 0000 by Jose Manuel العراقي RN  Safety Promotion/Fall Prevention:   safety round/check completed   room organization consistent   nonskid shoes/slippers when out of bed   clutter free environment maintained   assistive device/personal items within reach   fall prevention program maintained  Taken 6/21/2025 2300 by Jose Manuel العراقي RN  Safety Promotion/Fall Prevention:   safety round/check completed   room organization consistent   nonskid shoes/slippers when out of bed   clutter free environment maintained   assistive device/personal items within reach   fall prevention program maintained  Taken 6/21/2025 2200 by Jose Manuel العراقي RN  Safety Promotion/Fall Prevention:   safety round/check completed   room organization consistent   nonskid shoes/slippers when out of bed   clutter free environment maintained   assistive device/personal items within reach   fall prevention program maintained  Taken 6/21/2025 2100 by Jose Manuel العراقي RN  Safety Promotion/Fall Prevention:   safety round/check completed   room organization consistent   nonskid shoes/slippers when out of bed   clutter free environment maintained   assistive device/personal items within reach   fall prevention program maintained  Taken 6/21/2025 2000 by Jose Manuel العراقي, RN  Safety Promotion/Fall Prevention:   safety round/check completed   room organization consistent   nonskid shoes/slippers when out of bed   clutter free environment maintained   assistive device/personal items within reach   fall prevention program maintained  Taken 6/21/2025 1900 by Jose Manuel العراقي RN  Safety Promotion/Fall Prevention:   safety round/check completed    room organization consistent   nonskid shoes/slippers when out of bed   clutter free environment maintained   assistive device/personal items within reach   fall prevention program maintained  Intervention: Prevent Skin Injury  Recent Flowsheet Documentation  Taken 6/22/2025 0501 by Jose Manuel العرايق RN  Body Position: position changed independently  Taken 6/22/2025 0415 by Jose Manuel العراقي RN  Skin Protection:   transparent dressing maintained   skin sealant/moisture barrier applied   silicone foam dressing in place   pulse oximeter probe site changed   incontinence pads utilized  Taken 6/22/2025 0300 by Jose Manuel العراقي RN  Body Position: position changed independently  Taken 6/22/2025 0100 by Jose Manuel العراقي RN  Body Position: position changed independently  Taken 6/22/2025 0030 by Jose Manuel العراقي RN  Skin Protection:   transparent dressing maintained   skin sealant/moisture barrier applied   silicone foam dressing in place   pulse oximeter probe site changed  Taken 6/21/2025 2300 by Jose Manuel العراقي RN  Body Position: position changed independently  Taken 6/21/2025 2100 by Jose Manuel العراقي RN  Body Position: position changed independently  Taken 6/21/2025 2000 by Jose Manuel العراقي RN  Skin Protection:   transparent dressing maintained   skin sealant/moisture barrier applied   silicone foam dressing in place   pulse oximeter probe site changed  Taken 6/21/2025 1900 by Jose Manuel العراقي RN  Body Position: weight shifting  Intervention: Prevent and Manage VTE (Venous Thromboembolism) Risk  Recent Flowsheet Documentation  Taken 6/22/2025 0415 by Jose Manuel العراقي RN  VTE Prevention/Management:   bilateral   SCDs (sequential compression devices) on  Taken 6/22/2025 0030 by Jose Manuel العراقي RN  VTE Prevention/Management: SCDs (sequential compression devices) off  Goal: Optimal Comfort and Wellbeing  Outcome: Progressing  Intervention: Monitor Pain and Promote Comfort  Recent Flowsheet  Documentation  Taken 6/22/2025 0415 by Jose Manuel العراقي RN  Pain Management Interventions:   care clustered   pillow support provided   position adjusted  Taken 6/22/2025 0030 by Jose Manuel العراقي RN  Pain Management Interventions:   care clustered   pillow support provided   position adjusted  Taken 6/21/2025 2030 by Jose Manuel العراقي RN  Pain Management Interventions:   care clustered   position adjusted  Intervention: Provide Person-Centered Care  Recent Flowsheet Documentation  Taken 6/22/2025 0415 by Jose Manuel العراقي RN  Trust Relationship/Rapport:   care explained   choices provided   emotional support provided   empathic listening provided   questions answered   questions encouraged   reassurance provided   thoughts/feelings acknowledged  Taken 6/22/2025 0030 by Jose Manuel العراقي RN  Trust Relationship/Rapport:   care explained   choices provided   emotional support provided   empathic listening provided   questions answered   questions encouraged   reassurance provided   thoughts/feelings acknowledged  Taken 6/21/2025 2030 by Jose Manuel العراقي RN  Trust Relationship/Rapport:   care explained   choices provided   emotional support provided   empathic listening provided   questions answered   questions encouraged   reassurance provided   thoughts/feelings acknowledged  Goal: Readiness for Transition of Care  Outcome: Progressing     Problem: Hypertension Acute  Goal: Blood Pressure Within Desired Range  Outcome: Progressing     Problem: Skin Injury Risk Increased  Goal: Skin Health and Integrity  Outcome: Progressing  Intervention: Optimize Skin Protection  Recent Flowsheet Documentation  Taken 6/22/2025 0501 by Jose Manuel العراقي, RN  Head of Bed (HOB) Positioning: HOB at 30-45 degrees  Taken 6/22/2025 0415 by Jose Manuel العراقي, RN  Activity Management:   up to bedside commode   back to bed  Pressure Reduction Techniques:   frequent weight shift encouraged   positioned off wounds   pressure points  protected   weight shift assistance provided  Pressure Reduction Devices:   specialty bed utilized   pressure-redistributing mattress utilized   positioning supports utilized  Skin Protection:   transparent dressing maintained   skin sealant/moisture barrier applied   silicone foam dressing in place   pulse oximeter probe site changed   incontinence pads utilized  Taken 6/22/2025 0300 by Jose Manuel العراقي RN  Head of Bed ([facility]) Positioning: HOB at 30-45 degrees  Taken 6/22/2025 0100 by Jose Manuel العراقي RN  Activity Management:   up to bedside commode   back to bed  Head of Bed ([facility]) Positioning: HOB at 30-45 degrees  Taken 6/22/2025 0030 by Jose Manuel العراقي RN  Pressure Reduction Techniques:   weight shift assistance provided   frequent weight shift encouraged   heels elevated off bed   positioned off wounds   pressure points protected  Pressure Reduction Devices:   specialty bed utilized   pressure-redistributing mattress utilized   positioning supports utilized  Skin Protection:   transparent dressing maintained   skin sealant/moisture barrier applied   silicone foam dressing in place   pulse oximeter probe site changed  Taken 6/21/2025 2300 by Jose Manuel العراقي RN  Head of Bed ([facility]) Positioning: HOB at 30-45 degrees  Taken 6/21/2025 2100 by Jose Manuel العراقي RN  Activity Management: back to bed  Head of Bed ([facility]) Positioning: HOB at 30-45 degrees  Taken 6/21/2025 2000 by Jose Manuel العراقي RN  Pressure Reduction Techniques:   frequent weight shift encouraged   heels elevated off bed   positioned off wounds   pressure points protected   weight shift assistance provided  Pressure Reduction Devices:   specialty bed utilized   pressure-redistributing mattress utilized   positioning supports utilized  Skin Protection:   transparent dressing maintained   skin sealant/moisture barrier applied   silicone foam dressing in place   pulse oximeter probe site changed  Taken 6/21/2025 1900 by Jose Manuel العراقي  RN  Activity Management: up to bedside commode  Head of Bed (HOB) Positioning: HOB at 30-45 degrees     Problem: Fall Injury Risk  Goal: Absence of Fall and Fall-Related Injury  Outcome: Progressing  Intervention: Promote Injury-Free Environment  Recent Flowsheet Documentation  Taken 6/22/2025 0600 by Jose Manuel العراقي RN  Safety Promotion/Fall Prevention:   safety round/check completed   room organization consistent   nonskid shoes/slippers when out of bed   clutter free environment maintained   assistive device/personal items within reach   fall prevention program maintained  Taken 6/22/2025 0501 by Jose Manuel العراقي RN  Safety Promotion/Fall Prevention:   safety round/check completed   room organization consistent   nonskid shoes/slippers when out of bed   clutter free environment maintained   assistive device/personal items within reach   fall prevention program maintained  Taken 6/22/2025 0400 by Jose Manuel العراقي RN  Safety Promotion/Fall Prevention:   safety round/check completed   room organization consistent   nonskid shoes/slippers when out of bed   clutter free environment maintained   assistive device/personal items within reach   fall prevention program maintained  Taken 6/22/2025 0300 by Jose Manuel العراقي RN  Safety Promotion/Fall Prevention:   safety round/check completed   room organization consistent   nonskid shoes/slippers when out of bed   clutter free environment maintained   assistive device/personal items within reach   fall prevention program maintained  Taken 6/22/2025 0200 by Jose Maunel العراقي RN  Safety Promotion/Fall Prevention:   safety round/check completed   room organization consistent   nonskid shoes/slippers when out of bed   clutter free environment maintained   assistive device/personal items within reach   fall prevention program maintained  Taken 6/22/2025 0100 by Jose Manuel العراقي RN  Safety Promotion/Fall Prevention:   safety round/check completed   room organization  consistent   nonskid shoes/slippers when out of bed   clutter free environment maintained   assistive device/personal items within reach   fall prevention program maintained  Taken 6/22/2025 0000 by Jose Manuel العراقي RN  Safety Promotion/Fall Prevention:   safety round/check completed   room organization consistent   nonskid shoes/slippers when out of bed   clutter free environment maintained   assistive device/personal items within reach   fall prevention program maintained  Taken 6/21/2025 2300 by Jose Manuel العراقي, RN  Safety Promotion/Fall Prevention:   safety round/check completed   room organization consistent   nonskid shoes/slippers when out of bed   clutter free environment maintained   assistive device/personal items within reach   fall prevention program maintained  Taken 6/21/2025 2200 by Jose Manuel العراقي, RN  Safety Promotion/Fall Prevention:   safety round/check completed   room organization consistent   nonskid shoes/slippers when out of bed   clutter free environment maintained   assistive device/personal items within reach   fall prevention program maintained  Taken 6/21/2025 2100 by Jose Manuel العراقي RN  Safety Promotion/Fall Prevention:   safety round/check completed   room organization consistent   nonskid shoes/slippers when out of bed   clutter free environment maintained   assistive device/personal items within reach   fall prevention program maintained  Taken 6/21/2025 2000 by Jose Manuel العراقي, RN  Safety Promotion/Fall Prevention:   safety round/check completed   room organization consistent   nonskid shoes/slippers when out of bed   clutter free environment maintained   assistive device/personal items within reach   fall prevention program maintained  Taken 6/21/2025 1900 by Jose Manuel العراقي, RN  Safety Promotion/Fall Prevention:   safety round/check completed   room organization consistent   nonskid shoes/slippers when out of bed   clutter free environment maintained   assistive  device/personal items within reach   fall prevention program maintained   Goal Outcome Evaluation:  Plan of Care Reviewed With: patient        Progress: improving

## 2025-06-22 NOTE — PROGRESS NOTES
Cardiology Progress Note    Patient Identification:  Name: Mango Ling  Age: 79 y.o.  Sex: male  :  1945  MRN: [id number]                 Follow Up / Chief Complaint: PE  Chief Complaint   Patient presents with    Shortness of Breath       Interval History: Patient presented with shortness of breath was found to have bilateral saddle PE with cor pulmonale.  Underwent Inari PE declot 2025.  Patient denies any chest pain or shortness of breath       Subjective: Patient seen and examined.  Chart reviewed.  Labs reviewed.  Discussed with RN taking care of patient.      Objective:  2025: Potassium was 3.9 and 3.7.  Glucose 118  2025: Hemoglobin 10.7, creatinine 1.24    History of present illness:      Mr. Mango Ling has PMH of     Hypertension  AGUILA  Kidney stone  Alzheimer's     Presented with complaint of worsening shortness of breath was found to have bilateral PE therefore interventional cardiology was consulted.  Patient has shortness of breath for over a year and decreased activity level and chronic pain.     Data:  2025: HS troponin is 25 and 23.  proBNP normal at 135.  CMP with a creatinine of 1.4, EGFR 51, glucose 111.  CBC with a white count of 10.8, D-dimer 4.45  CT PE study 2025: Reviewed/interpreted by me: Saddle PE and emboli in left and right main arteries extending into multiple lobar and subsegmental branches bilaterally.  RV to LV ratio is 1.2 compatible with RV strain.  Mild left lower lobe groundglass opacity compatible with developing pulmonary infarct.  Hepatic steatosis and coronary atherosclerosis  EKG 2025 reviewed/interpreted by me reveals sinus rhythm with rate of 63 bpm  Echocardiogram 2025 reviewed/interpreted by me reveals normal LV size and function EF of 5660%.  Mildly reduced RV function and mild RV enlargement and moderate mitral valve thickening        Assessment:  :     Acute saddle PE with cor pulmonale  DVT  Hypertension  Obesity  with AGUILA  Hyperglycemia  Renal insufficiency        Recommendations / Plan:            Patient presented with shortness of breath  Was diagnosed with acute bilateral saddle PE with RV strain  Went to Cath Lab emergently for renal artery PAD clot  Patient is on IV heparin  Hemoglobin is stable will start on Eliquis per protocol.  Monitor hemoglobin.  EKG done 6/21/2025 reviewed/interpreted by me reveals sinus rhythm with rate of 65 bpm.  Continue losartan for blood pressure  Monitor renal function.  Increase activity.    Copied text in this portion of the note has been reviewed and is accurate as of 6/22/2025    Past Medical History:  Past Medical History:   Diagnosis Date    Alzheimer disease     Bladder stones     Hypertension     Sleep apnea     CPAP     Past Surgical History:  Past Surgical History:   Procedure Laterality Date    BACK SURGERY      lower back    CYSTOSCOPY LITHOLAPAXY BLADDER STONE EXTRACTION N/A 10/30/2023    Procedure: CYSTOSCOPY LITHOLAPAXY BLADDER STONE EXTRACTION;  Surgeon: Ryan Romero MD;  Location: [facility];  Service: Urology;  Laterality: N/A;    CYSTOSCOPY TRANSURETHRAL RESECTION OF PROSTATE N/A 10/30/2023    Procedure: CYSTOSCOPY TRANSURETHRAL RESECTION OF PROSTATE;  Surgeon: Ryan Romero MD;  Location: [facility];  Service: Urology;  Laterality: N/A;    CYSTOSCOPY W/ LASER LITHOTRIPSY      TOTAL SHOULDER REPLACEMENT Right         Social History:   Social History     Tobacco Use    Smoking status: Never    Smokeless tobacco: Never   Substance Use Topics    Alcohol use: Yes     Alcohol/week: 6.0 standard drinks of alcohol     Types: 6 Shots of liquor per week      Family History:  Family History   Problem Relation Age of Onset    Alzheimer's disease Father           Allergies:  No Known Allergies  Scheduled Meds:  donepezil, 5 mg, BID  escitalopram, 10 mg, Daily  finasteride, 5 mg, Daily  [Held by provider] losartan, 100 mg, Q24H  memantine, 5 mg, Q12H  mupirocin, 1  "Application, BID  pantoprazole, 40 mg, Q AM  sodium chloride, 10 mL, Q12H          Review of Systems:   ROS  Review of Systems   Constitution: Negative for chills and fever.   Cardiovascular: Negative for chest pain and palpitations.   Respiratory: Negative for cough and hemoptysis.    Gastrointestinal: Negative for nausea.        Constitutional:  Temp:  [97.7 °F (36.5 °C)-98.8 °F (37.1 °C)] 98.3 °F (36.8 °C)  Heart Rate:  [57-79] 72  Resp:  [16-20] 20  BP: (117-139)/(65-73) 139/73    Physical Exam   /73   Pulse 72   Temp 98.3 °F (36.8 °C) (Oral)   Resp 20   Ht 177.8 cm (70\")   Wt 113 kg (249 lb 9 oz)   SpO2 97%   BMI 35.81 kg/m²   General:  Appears in no acute distress  Eyes: Sclera is anicteric,  conjunctiva is clear   HEENT:  No JVD. Thyroid not visibly enlarged. No mucosal pallor or cyanosis  Respiratory: Respirations regular and unlabored at rest.  Clear to auscultation  Cardiovascular: S1,S2 Regular rate and rhythm. No murmur, rub or gallop auscultated.  . No pretibial pitting edema  Gastrointestinal: Abdomen nondistended.  Musculoskeletal:  No abnormal movements  Extremities: No digital clubbing or cyanosis  Skin: Color pink.   Neuro: Alert and awake.    INTAKE AND OUTPUT:    Intake/Output Summary (Last 24 hours) at 6/22/2025 0840  Last data filed at 6/21/2025 2030  Gross per 24 hour   Intake 560 ml   Output --   Net 560 ml       Cardiographics  Telemetry: Reviewed and interpreted by me reveals sinus rhythm    ECG:   ECG 12 Lead Rhythm Change   Preliminary Result   HEART RATE=65  bpm   RR Fczjzxns=536  ms   AL Dawbkgjv=703  ms   P Horizontal Axis=-7  deg   P Front Axis=51  deg   QRSD Interval=96  ms   QT Pkhbddkx=846  ms   ZJcU=455  ms   QRS Axis=23  deg   T Wave Axis=-19  deg   - NORMAL ECG -   Sinus rhythm   Date and Time of Study:2025-06-21 08:39:54      ECG 12 Lead Dyspnea   Final Result   HEART RATE=63  bpm   RR Fwdylxru=098  ms   AL Qjkegicv=654  ms   P Horizontal Axis=9  deg   P Front " Axis=39  deg   QRSD Interval=94  ms   QT Mclexfnj=010  ms   GIaK=664  ms   QRS Axis=25  deg   T Wave Axis=41  deg   - NORMAL ECG -   Sinus rhythm   When compared with ECG of 19-Oct-2023 10:25:59,   No significant change   Electronically Signed By: Mango Joseph (CHYNA) 2025-06-21 09:20:35   Date and Time of Study:2025-06-20 09:49:05        I have personally reviewed EKG    Echocardiogram: Results for orders placed during the hospital encounter of 06/20/25    ADULT TRANSTHORACIC ECHO COMPLETE W/ CONT IF NECESSARY PER PROTOCOL    Interpretation Summary    Left ventricular ejection fraction appears to be 56 - 60%.    Mildly reduced right ventricular systolic function noted.    The right ventricular cavity is mildly dilated.    There is moderate, bileaflet mitral valve thickening present.      STRESS TEST      HEART CATHETERIZATION  Results for orders placed during the hospital encounter of 06/20/25    Cardiac Catheterization/Vascular Study    Conclusion  PULMONARY ANGIOGRAPHY AND MECHANICAL/Inari THROMBECTOMY REPORT:    Date of procedure: 6/20/2025    Indication:  Submassive pulmonary embolus, with RV enlargement, elevated troponin and lower extremity DVT    Procedure performed:  -Cath placement in IVC  -Right heart cath 93626  -Selective cath placement in left PA  -Selective cath placement in the right PA  -Extirpation of matter from bilateral PA via mechanical thrombectomy 37184 x 2  -Selective left pulmonary artery angiogram 55025  -Selective right pulmonary artery angiogram 09867  -Catheter placement and angiography and [place]irpation [place] [address] left upper lobe apical posterior segment branch , left lower lateral segment branch, left lower lobe posterior basal segment branch.  -Catheter placement and angiographyand extirpation of matter of in right lower lobe anterior basal segment, right interlobar PA, truncus anterior, right upper lobe apical segment branch  Right and left pulmonary artery  thrombectomy.    Description of procedure:    Risk benefits alternatives of the procedure were explained to the patient in CCU at length and answered all his questions.  The patient was brought to the cath lab.   Patient was prepped and draped in the usual manner.  Timeout was done.  Lidocaine was infused in the right groin for anesthesia. Access was obtained in the right femoral vein using a micropuncture technique. A 035 wire and 6 [place] sheath was placed.  Then the sheath was dilated up to 24 [place]  sheath.  This was placed in IVC..  6 [place] 035 [place] was advanced into left PA and a Amplatz superstiff wire was placed in left lower lobe posterior basal segment branch.  Then a 24 sheath Triever was advanced into the left main PA.  Angiography was obtained.  Then mechanical aspiration suction thrombectomy/extirpation of matter was performed in the left upper lobe apical posterior segment branch left lower lobe lateral segment branch and left lower lobe posterior basal segment branch, with the help of triever 20 curve sheath to direct into each 1 of these branches.  Then post angiography was done to make sure clot was resolved.  Then the retriever 24 sheath was directed towards the right PA with the help of a JR4 diagnostic catheter and 035 wire.  Angiography was obtained in the main PA and branches individually.  Mechanical aspiration suction thrombectomy/extubation of matter was performed right lower lobe anterior basal segment, right interlobar lower pulmonary artery, truncus anterior, right upper lobe apical segment branch.  Then post angiography was performed to make sure clot was resolved.    Significant amount of thrombus was retrieved from both left and right side.  Patient's oxygenation improved significantly post procedure compared to preprocedure.  Heart rate also decreased and improved post procedure.    PA pressures were measured after the procedure and improved.    Estimated blood loss was 80  "cc    Intravenous heparin was used as anticoagulation and therapeutic ACT's were maintained throughout the procedure.  Postprocedure the sheath was removed and figure-of-eight suture was applied to subcutaneous tissue for hemostasis.  The suture will be removed in 2 hours    Patient tolerated the procedure well.  Immediate complications were none.        Hemodynamics:    1.  Baseline PA pressures : Patient's baseline zeroing was not accurate since the transducer connection was loose.  22/3    2.  POST THROMBECTOMY PA : 37/7    Conclusion: Bilateral pulmonary emboli.  Technically successful bilateral pulmonary artery thrombectomy/extirpation of matter.    Recommendation:  Patient was given intravenous heparin during the procedure to keep ACT is over 300.  We will stop IV heparin  Will start on Eliquis DVT PE protocol      Lab Review   I have reviewed the labs  Results from last 7 days   Lab Units 06/20/25  1145 06/20/25  1015   HSTROP T ng/L 23* 25*     Results from last 7 days   Lab Units 06/22/25  0355   MAGNESIUM mg/dL 2.0     Results from last 7 days   Lab Units 06/22/25  0355   SODIUM mmol/L 139   POTASSIUM mmol/L 3.8   BUN mg/dL 16.3   CREATININE mg/dL 1.24   CALCIUM mg/dL 8.5*         Results from last 7 days   Lab Units 06/22/25  0355 06/21/25  0407 06/20/25  2147   WBC 10*3/mm3 10.93* 13.08* 10.89*   HEMOGLOBIN g/dL 10.7* 13.4 12.0*   HEMATOCRIT % 33.1* 41.2 36.6*   PLATELETS 10*3/mm3 205 252 197     Results from last 7 days   Lab Units 06/21/25  1816 06/20/25  1015   INR   --  1.11*   APTT seconds 82.0* 29.9       RADIOLOGY:  Imaging Results (Last 24 Hours)       ** No results found for the last 24 hours. **                  )6/22/2025  Von Buchanan MD      EMR Dragon/Transcription:   \"Dictated utilizing Dragon dictation\".   "

## 2025-06-22 NOTE — PROGRESS NOTES
[facility] MEDICINE SERVICE  DAILY PROGRESS NOTE    NAME: Mango Ling  : 1945  MRN: [id number]      LOS: 2 days     PROVIDER OF SERVICE: Abad Flannery MD    Chief Complaint: Acute saddle pulmonary embolism with acute cor pulmonale    Subjective:     Interval History:  History taken from: patient chart family RN    On room air no chest pain breathing improved significantly      Review of Systems:   Review of Systems  All negative except as above  Objective:     Vital Signs  Temp:  [97.7 °F (36.5 °C)-98.8 °F (37.1 °C)] 98.3 °F (36.8 °C)  Heart Rate:  [57-79] 72  Resp:  [16-20] 20  BP: (117-139)/(65-73) 139/73   Body mass index is 35.81 kg/m².    Physical Exam  Physical Exam  NAD  RRR S1-S2 Audible  Lungs with Fair Entry  Abdomen Soft Nontender Nondistended     Diagnostic Data    Results from last 7 days   Lab Units 25  0355   WBC 10*3/mm3 10.93*   HEMOGLOBIN g/dL 10.7*   HEMATOCRIT % 33.1*   PLATELETS 10*3/mm3 205   GLUCOSE mg/dL 108*   CREATININE mg/dL 1.24   BUN mg/dL 16.3   SODIUM mmol/L 139   POTASSIUM mmol/L 3.8   AST (SGOT) U/L 26   ALT (SGPT) U/L 21   ALK PHOS U/L 71   BILIRUBIN mg/dL 0.5   ANION GAP mmol/L 10.0       CT Angiogram Chest Pulmonary Embolism  Result Date: 2025  Impression: 1. Extensive bilateral pulmonary emboli with saddle pulmonary embolus and emboli in the left and right main pulmonary arteries extending into multiple lobar and segmental branches bilaterally. 2. Interventricular septum mildly deviated to left with RV to LV ratio measuring 1.2 compatible with right heart strain. 3. Mild left lower lobe peripheral groundglass opacities compatible with developing pulmonary infarct. 4. Extensive coronary artery atherosclerosis. 5. Hepatic steatosis, cholelithiasis, and additional chronic findings above. I discussed critical findings with referring provider VIKI Giordano, at 11:39 a.m. on 2025. Electronically Signed: Francisco Holloway MD  2025 11:49  AM EDT  Workstation ID: [id number]    XR Chest 1 View  Result Date: 6/20/2025  Impression: Mild left lower lobe opacities with volume loss favoring atelectasis. Electronically Signed: Lee Huff MD  6/20/2025 10:13 AM EDT  Workstation ID: [id number]        I reviewed the patient's new clinical results.  I reviewed the patient's new imaging results and agree with the interpretation.    Assessment/Plan:     Active and Resolved Problems  Active Hospital Problems    Diagnosis  POA    **Acute saddle pulmonary embolism with acute cor pulmonale [I26.02]  Unknown      Resolved Hospital Problems   No resolved problems to display.       #Acute saddle pulmonary embolism  #Acute hypoxic respiratory failure  #Acute left lower extremity DVT  CT PE protocol reviewed.  Evidence of right heart strain  Started on heparin drip  Cardiology consulted underwent an inari 6/20  Heparin drip has been transitioned to Eliquis by cardiology monitor H&H given acute drop    #Acute anemia  Monitor H&H Eliquis started by cardiology  Transfuse PRBCs.  To keep hemoglobin more than 8    #Hypertension  Restart losartan as blood pressure permits    #CKD  Renal function at baseline    #Alzheimer dementia  Continue Aricept and Namenda    #Anxiety/depression  Continue Lexapro    #BPH  Continue Proscar    #GERD  Continue PPI    VTE Prophylaxis:  Pharmacologic VTE prophylaxis orders are present.             Disposition Planning:     Barriers to Discharge:medical workup   Anticipated Date of Discharge: 6/23  Place of Discharge: home      Time: 45 minutes     Code Status and Medical Interventions: CPR (Attempt to Resuscitate); Full Support   Ordered at: 06/20/25 1420     Code Status (Patient has no pulse and is not breathing):    CPR (Attempt to Resuscitate)     Medical Interventions (Patient has pulse or is breathing):    Full Support       Signature: Electronically signed by Abad Flannery MD, 06/22/25, 09:16 EDT.  [facility], [place], operated by [facility] Hospitalist Team

## 2025-06-22 NOTE — PLAN OF CARE
Goal Outcome Evaluation:  Plan of Care Reviewed With: patient, spouse         Patient remains in the ICU but is being followed by the hospital MD. Currently on no gtts, patients heparin was discontinued and started on Eliquis.                                 Taltz Pregnancy And Lactation Text: The risk during pregnancy and breastfeeding is uncertain with this medication.

## 2025-06-23 ENCOUNTER — NURSE TRIAGE (OUTPATIENT)
Dept: CALL CENTER | Facility: HOSPITAL | Age: 80
End: 2025-06-23
Payer: MEDICARE

## 2025-06-23 ENCOUNTER — TELEPHONE (OUTPATIENT)
Dept: CARDIOLOGY | Facility: CLINIC | Age: 80
End: 2025-06-23
Payer: MEDICARE

## 2025-06-23 VITALS
HEIGHT: 70 IN | WEIGHT: 249.56 LBS | SYSTOLIC BLOOD PRESSURE: 108 MMHG | TEMPERATURE: 97.7 F | HEART RATE: 72 BPM | DIASTOLIC BLOOD PRESSURE: 62 MMHG | OXYGEN SATURATION: 97 % | RESPIRATION RATE: 20 BRPM | BODY MASS INDEX: 35.73 KG/M2

## 2025-06-23 LAB
ALBUMIN SERPL-MCNC: 3.6 G/DL (ref 3.5–5.2)
ALBUMIN/GLOB SERPL: 1.4 G/DL
ALP SERPL-CCNC: 88 U/L (ref 39–117)
ALT SERPL W P-5'-P-CCNC: 29 U/L (ref 1–41)
ANION GAP SERPL CALCULATED.3IONS-SCNC: 8.2 MMOL/L (ref 5–15)
AST SERPL-CCNC: 31 U/L (ref 1–40)
BASOPHILS # BLD AUTO: 0.05 10*3/MM3 (ref 0–0.2)
BASOPHILS NFR BLD AUTO: 0.4 % (ref 0–1.5)
BILIRUB SERPL-MCNC: 0.6 MG/DL (ref 0–1.2)
BUN SERPL-MCNC: 14.7 MG/DL (ref 8–23)
BUN/CREAT SERPL: 10.7 (ref 7–25)
CALCIUM SPEC-SCNC: 9 MG/DL (ref 8.6–10.5)
CHLORIDE SERPL-SCNC: 103 MMOL/L (ref 98–107)
CO2 SERPL-SCNC: 23.8 MMOL/L (ref 22–29)
CREAT SERPL-MCNC: 1.37 MG/DL (ref 0.76–1.27)
DEPRECATED RDW RBC AUTO: 44.4 FL (ref 37–54)
EGFRCR SERPLBLD CKD-EPI 2021: 52.5 ML/MIN/1.73
EOSINOPHIL # BLD AUTO: 0.12 10*3/MM3 (ref 0–0.4)
EOSINOPHIL NFR BLD AUTO: 1.1 % (ref 0.3–6.2)
ERYTHROCYTE [DISTWIDTH] IN BLOOD BY AUTOMATED COUNT: 12.8 % (ref 12.3–15.4)
GLOBULIN UR ELPH-MCNC: 2.6 GM/DL
GLUCOSE SERPL-MCNC: 112 MG/DL (ref 65–99)
HCT VFR BLD AUTO: 35.3 % (ref 37.5–51)
HGB BLD-MCNC: 11.4 G/DL (ref 13–17.7)
IMM GRANULOCYTES # BLD AUTO: 0.04 10*3/MM3 (ref 0–0.05)
IMM GRANULOCYTES NFR BLD AUTO: 0.4 % (ref 0–0.5)
LYMPHOCYTES # BLD AUTO: 2.64 10*3/MM3 (ref 0.7–3.1)
LYMPHOCYTES NFR BLD AUTO: 23.7 % (ref 19.6–45.3)
MAGNESIUM SERPL-MCNC: 2.1 MG/DL (ref 1.6–2.4)
MCH RBC QN AUTO: 30.6 PG (ref 26.6–33)
MCHC RBC AUTO-ENTMCNC: 32.3 G/DL (ref 31.5–35.7)
MCV RBC AUTO: 94.6 FL (ref 79–97)
MONOCYTES # BLD AUTO: 0.88 10*3/MM3 (ref 0.1–0.9)
MONOCYTES NFR BLD AUTO: 7.9 % (ref 5–12)
NEUTROPHILS NFR BLD AUTO: 66.5 % (ref 42.7–76)
NEUTROPHILS NFR BLD AUTO: 7.43 10*3/MM3 (ref 1.7–7)
NRBC BLD AUTO-RTO: 0 /100 WBC (ref 0–0.2)
PHOSPHATE SERPL-MCNC: 2.2 MG/DL (ref 2.5–4.5)
PLATELET # BLD AUTO: 234 10*3/MM3 (ref 140–450)
PMV BLD AUTO: 8.5 FL (ref 6–12)
POTASSIUM SERPL-SCNC: 3.8 MMOL/L (ref 3.5–5.2)
PROT SERPL-MCNC: 6.2 G/DL (ref 6–8.5)
RBC # BLD AUTO: 3.73 10*6/MM3 (ref 4.14–5.8)
SODIUM SERPL-SCNC: 135 MMOL/L (ref 136–145)
WBC NRBC COR # BLD AUTO: 11.16 10*3/MM3 (ref 3.4–10.8)

## 2025-06-23 PROCEDURE — 85025 COMPLETE CBC W/AUTO DIFF WBC: CPT | Performed by: NURSE PRACTITIONER

## 2025-06-23 PROCEDURE — 97162 PT EVAL MOD COMPLEX 30 MIN: CPT

## 2025-06-23 PROCEDURE — 84100 ASSAY OF PHOSPHORUS: CPT | Performed by: NURSE PRACTITIONER

## 2025-06-23 PROCEDURE — 83735 ASSAY OF MAGNESIUM: CPT | Performed by: NURSE PRACTITIONER

## 2025-06-23 PROCEDURE — 97166 OT EVAL MOD COMPLEX 45 MIN: CPT

## 2025-06-23 PROCEDURE — 99232 SBSQ HOSP IP/OBS MODERATE 35: CPT | Performed by: INTERNAL MEDICINE

## 2025-06-23 PROCEDURE — 80053 COMPREHEN METABOLIC PANEL: CPT | Performed by: NURSE PRACTITIONER

## 2025-06-23 RX ORDER — POTASSIUM CHLORIDE 1.5 G/1.58G
20 POWDER, FOR SOLUTION ORAL ONCE
Status: COMPLETED | OUTPATIENT
Start: 2025-06-23 | End: 2025-06-23

## 2025-06-23 RX ADMIN — APIXABAN 10 MG: 5 TABLET, FILM COATED ORAL at 09:34

## 2025-06-23 RX ADMIN — Medication 10 ML: at 09:36

## 2025-06-23 RX ADMIN — DONEPEZIL HYDROCHLORIDE 5 MG: 5 TABLET, FILM COATED ORAL at 09:35

## 2025-06-23 RX ADMIN — Medication 2 PACKET: at 05:20

## 2025-06-23 RX ADMIN — FINASTERIDE 5 MG: 5 TABLET, FILM COATED ORAL at 09:34

## 2025-06-23 RX ADMIN — POLYETHYLENE GLYCOL 3350 17 G: 17 POWDER, FOR SOLUTION ORAL at 03:19

## 2025-06-23 RX ADMIN — PANTOPRAZOLE SODIUM 40 MG: 40 TABLET, DELAYED RELEASE ORAL at 05:20

## 2025-06-23 RX ADMIN — POTASSIUM CHLORIDE 20 MEQ: 1.5 POWDER, FOR SOLUTION ORAL at 05:20

## 2025-06-23 RX ADMIN — ESCITALOPRAM 10 MG: 10 TABLET, FILM COATED ORAL at 09:35

## 2025-06-23 RX ADMIN — MEMANTINE HYDROCHLORIDE 5 MG: 10 TABLET, FILM COATED ORAL at 09:35

## 2025-06-23 RX ADMIN — MUPIROCIN 1 APPLICATION: 20 OINTMENT TOPICAL at 09:34

## 2025-06-23 NOTE — DISCHARGE SUMMARY
[facility] Medicine Services  Discharge Summary    Date of Service: 2025  Patient Name: Mango Ling  : 1945  MRN: [id number]    Date of Admission: 2025  Discharge Diagnosis:   Acute saddle pulmonary embolism  Acute left lower extremity DVT  Acute respiratory failure with hypoxia  Anemia of chronic disease  Hypertension  CKD stage III  Alzheimer's dementia  Anxiety with depression  BPH  GERD  Obesity      Date of Discharge: 2025  Primary Care Physician: Deon Palacio MD      Presenting Problem:   Acute saddle pulmonary embolism with acute cor pulmonale [I26.02]  Dyspnea, unspecified type [R06.00]    Active and Resolved Hospital Problems:  Active Hospital Problems    Diagnosis POA    **Acute saddle pulmonary embolism with acute cor pulmonale [I26.02] Unknown      Resolved Hospital Problems   No resolved problems to display.         Hospital Course     HPI:    Patient is a 79-year-old male who presented to the hospital with complaints of shortness of breath.  Please see H&P for details.    Hospital Course:  Patient was found to have a large saddle pulmonary embolism on admission.  He was initially mated to the ICU as he was also having issues with hypoxia and respiratory failure.  The patient was  evaluated by cardiology and underwent thrombectomy on  with successful clot retrieval.  He was also found to have a left lower extremity DVT.  Most likely the patient's thrombus was a result of sedentary lifestyle leading to thromboembolism.  The patient was started on a heparin drip on admission and this was transitioned to oral anticoagulation with Eliquis prior to discharge, which she tolerated well.  The patient was eventually weaned off of oxygen completely and was overall feeling well.  PT/OT is recommending home health therapy.  He will be discharged to continue course of Eliquis.  He will need to follow-up with cardiology in 2 weeks.  He is stable for  discharge.        DISCHARGE Follow Up Recommendations for labs and diagnostics: Follow-up with cardiology in 2 weeks.        Day of Discharge     Vital Signs:  Temp:  [97.7 °F (36.5 °C)-98.8 °F (37.1 °C)] 97.7 °F (36.5 °C)  Heart Rate:  [] 72  Resp:  [16-20] 20  BP: (105-148)/(57-80) 108/62    Physical Exam:  Physical Exam   General Appearance:  Alert, cooperative, no distress, appears stated age  Head:  Normocephalic, without obvious abnormality, atraumatic  Eyes:  PERRL, conjunctiva/corneas clear, EOM's intact, fundi benign, both eyes  Ears:  Normal TM's and external ear canals, both ears  Nose: Nares normal, septum midline, mucosa normal, no drainage or sinus tenderness  Throat: Lips, mucosa, and tongue normal; teeth and gums normal  Neck: Supple, symmetrical, trachea midline, no adenopathy, thyroid: not enlarged, symmetric, no tenderness/mass/nodules, no carotid bruit or JVD  Lungs:   Clear to auscultation bilaterally, respirations unlabored  Heart:  Regular rate and rhythm, S1, S2 normal, no murmur, rub or gallop  Abdomen:  Soft, non-tender, bowel sounds active all four quadrants,  no masses, no organomegaly  Extremities: Extremities normal, atraumatic, no cyanosis or edema  Pulses: 2+ and symmetric  Skin: Skin color, texture, turgor normal, no rashes or lesions  Neurologic: Normal        Pertinent  and/or Most Recent Results     LAB RESULTS:      Lab 06/23/25  0329 06/22/25  0355 06/21/25  1816 06/21/25  1153 06/21/25  0407 06/20/25  2147 06/20/25  1015   WBC 11.16* 10.93*  --   --  13.08* 10.89* 10.85*   HEMOGLOBIN 11.4* 10.7*  --   --  13.4 12.0* 13.7   HEMATOCRIT 35.3* 33.1*  --   --  41.2 36.6* 42.3   PLATELETS 234 205  --   --  252 197 243   NEUTROS ABS 7.43* 7.24*  --   --  9.31*  --  7.18*   IMMATURE GRANS (ABS) 0.04 0.04  --   --  0.07*  --  0.05   LYMPHS ABS 2.64 2.78  --   --  2.77  --  2.54   MONOS ABS 0.88 0.74  --   --  0.83  --  0.92*   EOS ABS 0.12 0.11  --   --  0.05  --  0.11   MCV 94.6  93.5  --   --  93.4 94.1 94.0   PROTIME  --   --   --   --   --   --  14.3*   APTT  --   --  82.0*  --   --   --  29.9   HEPARIN ANTI-XA  --  0.47 0.35 0.31 0.16* 0.40 <0.10*   D DIMER QUANT  --   --   --   --   --   --  4.45*         Lab 06/23/25  0329 06/22/25  1101 06/22/25  0355 06/21/25  1816 06/21/25  1153 06/21/25  0407 06/20/25  1015   SODIUM 135*  --  139  --   --  136 140   POTASSIUM 3.8 4.2 3.8 4.1 3.7 3.9 3.9   CHLORIDE 103  --  107  --   --  104 104   CO2 23.8  --  22.0  --   --  21.6* 24.2   ANION GAP 8.2  --  10.0  --   --  10.4 11.8   BUN 14.7  --  16.3  --   --  18.0 16.0   CREATININE 1.37*  --  1.24  --   --  1.40* 1.40*   EGFR 52.5*  --  59.1*  --   --  51.1* 51.1*   GLUCOSE 112*  --  108*  --   --  118* 111*   CALCIUM 9.0  --  8.5*  --   --  9.0 9.6   MAGNESIUM 2.1  --  2.0  --   --  2.2  --    PHOSPHORUS 2.2*  --  2.4*  --   --  3.3  --          Lab 06/23/25  0329 06/22/25  0355 06/21/25  0407 06/20/25  1015   TOTAL PROTEIN 6.2 5.8* 6.6 7.3   ALBUMIN 3.6 3.3* 3.7 4.3   GLOBULIN 2.6 2.5 2.9 3.0   ALT (SGPT) 29 21 25 30   AST (SGOT) 31 26 32 30   BILIRUBIN 0.6 0.5 0.8 1.1   ALK PHOS 88 71 85 90   LIPASE  --   --   --  58         Lab 06/20/25  1145 06/20/25  1015   PROBNP  --  135.0   HSTROP T 23* 25*   PROTIME  --  14.3*   INR  --  1.11*         Lab 06/21/25  0407   CHOLESTEROL 199   LDL CHOL 129*   HDL CHOL 49   TRIGLYCERIDES 117             Brief Urine Lab Results  (Last result in the past 365 days)        Color   Clarity   Blood   Leuk Est   Nitrite   Protein   CREAT   Urine HCG        06/20/25 1303 Dark Yellow   Clear   Negative   Trace   Negative   Negative                 Microbiology Results (last 10 days)       Procedure Component Value - Date/Time    Urine Culture - Urine, Urine, Clean Catch [[id number]]  (Abnormal)  (Susceptibility) Collected: 06/20/25 1303    Lab Status: Final result Specimen: Urine, Clean Catch Updated: 06/22/25 0940     Urine Culture >100,000 CFU/mL Enterococcus  faecalis    Narrative:      Colonization of the urinary tract without infection is common. Treatment is discouraged unless the patient is symptomatic, pregnant, or undergoing an invasive urologic procedure.    Susceptibility        Enterococcus faecalis      TESFAYE      Ampicillin Susceptible      Levofloxacin Susceptible      Nitrofurantoin Susceptible      Vancomycin Susceptible                                   CT Angiogram Chest Pulmonary Embolism  Result Date: 6/20/2025  Impression: Impression: 1. Extensive bilateral pulmonary emboli with saddle pulmonary embolus and emboli in the left and right main pulmonary arteries extending into multiple lobar and segmental branches bilaterally. 2. Interventricular septum mildly deviated to left with RV to LV ratio measuring 1.2 compatible with right heart strain. 3. Mild left lower lobe peripheral groundglass opacities compatible with developing pulmonary infarct. 4. Extensive coronary artery atherosclerosis. 5. Hepatic steatosis, cholelithiasis, and additional chronic findings above. I discussed critical findings with referring provider VIKI Giordano, at 11:39 a.m. on 6/20/2025. Electronically Signed: Francisco Hloloway MD  6/20/2025 11:49 AM EDT  Workstation ID: [id number]    XR Chest 1 View  Result Date: 6/20/2025  Impression: Impression: Mild left lower lobe opacities with volume loss favoring atelectasis. Electronically Signed: Lee Huff MD  6/20/2025 10:13 AM EDT  Workstation ID: [id number]      Results for orders placed during the hospital encounter of 06/20/25    Duplex Pseudoaneurysm CAR    Interpretation Summary    No evidence of pseudoaneurysm or AV fistula in the right groin.      Results for orders placed during the hospital encounter of 06/20/25    Duplex Pseudoaneurysm CAR    Interpretation Summary    No evidence of pseudoaneurysm or AV fistula in the right groin.      Results for orders placed during the hospital encounter of 06/20/25    ADULT  TRANSTHORACIC ECHO COMPLETE W/ CONT IF NECESSARY PER PROTOCOL    Interpretation Summary    Left ventricular ejection fraction appears to be 56 - 60%.    Mildly reduced right ventricular systolic function noted.    The right ventricular cavity is mildly dilated.    There is moderate, bileaflet mitral valve thickening present.      Labs Pending at Discharge:  Pending Results       Procedure [Order ID] Specimen - Date/Time    Phosphorus [[id number]]     Specimen: Blood     Potassium [[id number]]     Specimen: Blood             Procedures Performed  Procedure(s):  Pulmonary angiography         Consults:   Consults       Date and Time Order Name Status Description    6/21/2025 10:19 AM Inpatient Hospitalist Consult Completed     6/20/2025 12:13 PM Inpatient Cardiology Consult                Discharge Details        Discharge Medications        New Medications        Instructions Start Date   Eliquis DVT/PE Starter Pack tablet therapy pack  Generic drug: Apixaban Starter Pack   5 mg, Oral, See Admin Instructions      apixaban 5 MG tablet tablet  Commonly known as: ELIQUIS   5 mg, Oral, 2 Times Daily   Start Date: July 23, 2025            Continue These Medications        Instructions Start Date   donepezil 5 MG tablet  Commonly known as: ARICEPT   5 mg, Oral, 2 Times Daily      escitalopram 10 MG tablet  Commonly known as: Lexapro   10 mg, Oral, Daily      finasteride 5 MG tablet  Commonly known as: PROSCAR       fluticasone 50 MCG/ACT nasal spray  Commonly known as: FLONASE       Gemtesa 75 MG tablet  Generic drug: Vibegron       latanoprost 0.005 % ophthalmic solution  Commonly known as: XALATAN       losartan 100 MG tablet  Commonly known as: COZAAR       memantine 5 MG tablet  Commonly known as: NAMENDA   One tab daily for one month then one tab bid      pantoprazole 40 MG EC tablet  Commonly known as: PROTONIX       timolol 0.5 % ophthalmic solution  Commonly known as: TIMOPTIC       triamterene-hydrochlorothiazide  37.5-25 MG per tablet  Commonly known as: MAXZIDE-25       vitamin B-12 1000 MCG tablet  Commonly known as: CYANOCOBALAMIN   1,000 mcg, Daily             Stop These Medications      nitrofurantoin (macrocrystal-monohydrate) 100 MG capsule  Commonly known as: Macrobid              No Known Allergies      Discharge Disposition:     Home-Health Care Svc    Diet:  Hospital:  Diet Order   Procedures    Diet: Cardiac; Healthy Heart (2-3 Na+); Fluid Consistency: Thin (IDDSI 0)         Discharge Activity:         CODE STATUS:  Code Status and Medical Interventions: CPR (Attempt to Resuscitate); Full Support   Ordered at: 06/20/25 1420     Code Status (Patient has no pulse and is not breathing):    CPR (Attempt to Resuscitate)     Medical Interventions (Patient has pulse or is breathing):    Full Support         Future Appointments   Date Time Provider Department Center   7/29/2025  2:40 PM Seipel, Joseph F, MD [facility]       Additional Instructions for the Follow-ups that You Need to Schedule       Ambulatory Referral to Home Health   As directed      Face to Face Visit Date: 6/23/2025   Follow-up provider for Plan of Care?: I treated the patient in an acute care facility and will not continue treatment after discharge.   Follow-up provider: MARIAH CARVALHO [[id number]]   Reason/Clinical Findings: Acute saddle pulmonary embolism, acute respiratory failure with hypoxia   Describe mobility limitations that make leaving home difficult: Patient had a prolonged hospital stay with due to severe PE and has become somewhat physically deconditioned as a result, making it difficult for him to leave the house   Nursing/Therapeutic Services Requested: Physical Therapy Occupational Therapy   Frequency: 1 Week 1                Time spent on Discharge including face to face service: Greater than 30 minutes    Signature: Electronically signed by William Maldonado MD, 06/23/25, 14:03 EDT.  [facility] Hospitalist Team

## 2025-06-23 NOTE — TELEPHONE ENCOUNTER
Caller: Vesna Ling    Relationship to patient: Emergency Contact    Best call back number: [phone]    Type of visit: HOSPITAL FU    Requested date:06.30.25     Additional notes:PATIENT'S SPOUSE CALLED TO SCHEDULE A ONE WEEK HOSPITAL FU. NO NOTES IN DISCHARGE PAPERWORK SPOUSE STATED IT WAS HAND WRITTEN ON HER COPY PLEASE CALL AND ADVISE.

## 2025-06-23 NOTE — THERAPY EVALUATION
Patient Name: Mango Ling  : 1945    MRN: [id number]                              Today's Date: 2025       Admit Date: 2025    Visit Dx:     ICD-10-CM ICD-9-CM   1. Acute saddle pulmonary embolism with acute cor pulmonale  I26.02 415.13     415.0   2. Dyspnea, unspecified type  R06.00 786.09   3. Chest pain, unspecified type  R07.9 786.50   4. Lumbosacral radiculopathy  M54.17 724.4     Patient Active Problem List   Diagnosis    BPH with urinary obstruction    Calculus, bladder    Spinal stenosis of lumbar region    Lumbosacral radiculopathy    Osteoarthritis of right glenohumeral joint    Low back pain    Degenerative spondylolisthesis    Arthropathy of lumbar facet joint    Sleep apnea    Acute saddle pulmonary embolism with acute cor pulmonale     Past Medical History:   Diagnosis Date    Alzheimer disease     Bladder stones     Hypertension     Sleep apnea     CPAP     Past Surgical History:   Procedure Laterality Date    BACK SURGERY      lower back    CARDIAC CATHETERIZATION Bilateral 2025    Procedure: Pulmonary angiography;  Surgeon: Von Buchanan MD;  Location: [facility] CATH INVASIVE LOCATION;  Service: Cardiovascular;  Laterality: Bilateral;    CYSTOSCOPY LITHOLAPAXY BLADDER STONE EXTRACTION N/A 10/30/2023    Procedure: CYSTOSCOPY LITHOLAPAXY BLADDER STONE EXTRACTION;  Surgeon: Ryan Romero MD;  Location: [facility] OR;  Service: Urology;  Laterality: N/A;    CYSTOSCOPY TRANSURETHRAL RESECTION OF PROSTATE N/A 10/30/2023    Procedure: CYSTOSCOPY TRANSURETHRAL RESECTION OF PROSTATE;  Surgeon: Ryan Romero MD;  Location: [facility] MAIN OR;  Service: Urology;  Laterality: N/A;    CYSTOSCOPY W/ LASER LITHOTRIPSY      TOTAL SHOULDER REPLACEMENT Right       General Information       Row Name 25 1404          Physical Therapy Time and Intention    Document Type evaluation  -OD     Mode of Treatment physical therapy  -OD       Row Name 25 1404          General  Information    Patient Profile Reviewed yes  -OD     Prior Level of Function independent:;ADL's;all household mobility  uses cane more than RW  -OD     Existing Precautions/Restrictions fall  -OD     Barriers to Rehab cognitive status;medically complex  -OD       Row Name 06/23/25 1404          Living Environment    Current Living Arrangements home  -OD     People in Home spouse  -OD       Row Name 06/23/25 1404          Home Main Entrance    Number of Stairs, Main Entrance three  -OD     Stair Railings, Main Entrance railings safe and in good condition  -OD       Row Name 06/23/25 1404          Stairs Within Home, Primary    Number of Stairs, Within Home, Primary none  -OD       Row Name 06/23/25 1404          Cognition    Orientation Status (Cognition) oriented x 4  -OD       Row Name 06/23/25 1404          Safety Issues/Impairments Affecting Functional Mobility    Safety Issues Affecting Function (Mobility) insight into deficits/self-awareness;sequencing abilities;impulsivity  -OD     Impairments Affecting Function (Mobility) endurance/activity tolerance;balance;postural/trunk control;cognition  -OD     Cognitive Impairments, Mobility Safety/Performance insight into deficits/self-awareness;impulsivity;sequencing abilities  -OD               User Key  (r) = Recorded By, (t) = Taken By, (c) = Cosigned By      Initials Name Provider Type    OD Glenis Kruger PT Physical Therapist                   Mobility       Row Name 06/23/25 1406          Bed Mobility    Bed Mobility bed mobility (all) activities  -OD     All Activities, [place] (Bed Mobility) not tested  -OD     Comment, (Bed Mobility) in recliner upon arrival  -OD       Row Name 06/23/25 1406          Sit-Stand Transfer    Sit-Stand [place] (Transfers) contact guard  -OD     Assistive Device (Sit-Stand Transfers) cane, straight;walker, front-wheeled  -OD     Comment, (Sit-Stand Transfer) several bouts with cane vs RW  -OD       Row Name 06/23/25  1406          Gait/Stairs (Locomotion)    [place] Level (Gait) contact guard;minimum assist (75% patient effort)  -OD     Assistive Device (Gait) cane, straight;walker, front-wheeled  -OD     Patient was able to Ambulate yes  -OD     Distance in Feet (Gait) 200  2 x 100 ft  -OD     Deviations/Abnormal Patterns (Gait) other (see comments)  difficulty maneuvering RW; short, shuffled strides. some toe curling and reduced heel strike RLE.  -OD               User Key  (r) = Recorded By, (t) = Taken By, (c) = Cosigned By      Initials Name Provider Type    OD Glenis Kruger, ASHLEY Physical Therapist                   Obj/Interventions       Row Name 06/23/25 1536          Range of Motion Comprehensive    General Range of Motion bilateral lower extremity ROM WFL  -OD       Row Name 06/23/25 1536          Strength Comprehensive (MMT)    General Manual Muscle Testing (MMT) Assessment lower extremity strength deficits identified  -OD     Comment, General Manual Muscle Testing (MMT) Assessment BLE grossly 4/5  -OD       Row Name 06/23/25 1536          Motor Skills    Motor Skills functional endurance  -OD     Functional Endurance fair  -OD       Row Name 06/23/25 1536          Balance    Balance Interventions sitting;minimal challenge;standing;dynamic;moderate challenge  -OD       Row Name 06/23/25 1536          Sensory Assessment (Somatosensory)    Sensory Assessment (Somatosensory) sensation intact  -OD               User Key  (r) = Recorded By, (t) = Taken By, (c) = Cosigned By      Initials Name Provider Type    OD Glenis Kruger PT Physical Therapist                   Goals/Plan    No documentation.                  Clinical Impression       Row Name 06/23/25 1537          Pain    Additional Documentation Pain Scale: FACES Pre/Post-Treatment (Group)  -OD       Row Name 06/23/25 1537          Pain Scale: FACES Pre/Post-Treatment    Pain: FACES Scale, Pretreatment 2-->hurts little bit  -OD     Posttreatment Pain Rating  2-->hurts little bit  -OD       Row Name 06/23/25 1536          Plan of Care Review    Plan of Care Reviewed With patient;spouse  -OD     Progress improving  -OD     Outcome Evaluation Mango Ling is a 78 y/o M admitted to [facility] on 6/20/25 for L-sided flank and chest pain. D-dimer 4.45. CXR (+) acute saddle PE with acute cor pulmonale. Doppler (+) acute LLE DVT. Started on IV heparin, transitioned to oral anticoag 6/21. S/p pulmonary angiography with mechanical thrombectomy. PMH includes Alzheimer's disease, glaucoma, OAB. At baseline, pt lives with spouse in [facility] with 3[facility]. Pt typically ambulates with rollator within home at beginning of the day, progresses to no AD towards the end. Pt uses a cane for community mobility. Pt's spouse reports pt recently completed a bout of PT, but other than that has had reduced exercise/activity. This date, pt is AAOx4 and up in recliner upon arrival. Pt requires SBA for transfers and CGA-Brigitte for ambulation 2 x 100ft, requiring assist for turns due to impulsivity and reduced ability to sequence. First bout of ambulation pt utilized cane, however without any functional use (dragging behind him) and likely is more of a hazard. Second bout of ambulation, pt utilized a RW, with visual and verbal cues on how to use and how to turn. Pt appears near baseline function, but has some present balance and strength deficits impacting gait and functional ability. Recommend home with family assist and Outpatient PT. No therapy indicated while inpatient, will complete orders.  -OD       Row Name 06/23/25 5679          Therapy Assessment/Plan (PT)    Criteria for Skilled Interventions Met (PT) no;does not meet criteria for skilled intervention  -OD     Therapy Frequency (PT) evaluation only  -OD       Row Name 06/23/25 9884          Vital Signs    Intratreatment Heart Rate (beats/min) 109  -OD     O2 Delivery Pre Treatment room air  -OD     O2 Delivery Intra Treatment room air  -OD     O2 Delivery  Post Treatment room air  -OD     Pre Patient Position Sitting  -OD     Intra Patient Position Standing  -OD     Post Patient Position Sitting  -OD       Row Name 06/23/25 1537          Positioning and Restraints    Pre-Treatment Position sitting in chair/recliner  -OD     Post Treatment Position chair  -OD     In Chair notified nsg;reclined;call light within reach;encouraged to call for assist;exit alarm on  -OD               User Key  (r) = Recorded By, (t) = Taken By, (c) = Cosigned By      Initials Name Provider Type    Glenis Harrell PT Physical Therapist                   Outcome Measures       Row Name 06/23/25 1542 06/23/25 1000       How much help from another person do you currently need...    Turning from your back to your side while in flat bed without using bedrails? 4  -OD 4  -SE    Moving from lying on back to sitting on the side of a flat bed without bedrails? 4  -OD 4  -SE    Moving to and from a bed to a chair (including a wheelchair)? 3  -OD 3  -SE    Standing up from a chair using your arms (e.g., wheelchair, bedside chair)? 3  -OD 3  -SE    Climbing 3-5 steps with a railing? 3  -OD 3  -SE    To walk in hospital room? 3  -OD 3  -SE    AM-PAC 6 Clicks Score (PT) 20  -OD 20  -SE    Highest Level of Mobility Goal Walk 10 Steps or More-6  -OD Walk 10 Steps or More-6  -SE      Row Name 06/23/25 1542 06/23/25 1459       Functional Assessment    Outcome Measure Options AM-PAC 6 Clicks Basic Mobility (PT)  -OD AM-PAC 6 Clicks Daily Activity (OT)  -MS              User Key  (r) = Recorded By, (t) = Taken By, (c) = Cosigned By      Initials Name Provider Type    Sharon Germain, RN Registered Nurse    Nurys Dumas OT Occupational Therapist    Glenis Harrell PT Physical Therapist                                 Physical Therapy Education       Title: PT OT SLP Therapies (Done)       Topic: Physical Therapy (Done)       Point: Mobility training (Done)       Learning Progress Summary             Patient Acceptance, E, VU by OD at 6/23/2025 1542                      Point: Home exercise program (Done)       Learning Progress Summary            Patient Acceptance, E, VU by OD at 6/23/2025 1542                      Point: Body mechanics (Done)       Learning Progress Summary            Patient Acceptance, E, VU by OD at 6/23/2025 1542                      Point: Precautions (Done)       Learning Progress Summary            Patient Acceptance, E, VU by OD at 6/23/2025 1542                                      User Key       Initials Effective Dates Name Provider Type Discipline    OD 05/11/23 -  Glenis Kruger, PT Physical Therapist PT                  PT Recommendation and Plan     Progress: improving  Outcome Evaluation: Mango Ling is a 78 y/o M admitted to [facility] on 6/20/25 for L-sided flank and chest pain. D-dimer 4.45. CXR (+) acute saddle PE with acute cor pulmonale. Doppler (+) acute LLE DVT. Started on IV heparin, transitioned to oral anticoag 6/21. S/p pulmonary angiography with mechanical thrombectomy. PMH includes Alzheimer's disease, glaucoma, OAB. At baseline, pt lives with spouse in [facility] with [facility]. Pt typically ambulates with rollator within home at beginning of the day, progresses to no AD towards the end. Pt uses a cane for community mobility. Pt's spouse reports pt recently completed a bout of PT, but other than that has had reduced exercise/activity. This date, pt is AAOx4 and up in recliner upon arrival. Pt requires SBA for transfers and CGA-Brigitte for ambulation 2 x 100ft, requiring assist for turns due to impulsivity and reduced ability to sequence. First bout of ambulation pt utilized cane, however without any functional use (dragging behind him) and likely is more of a hazard. Second bout of ambulation, pt utilized a RW, with visual and verbal cues on how to use and how to turn. Pt appears near baseline function, but has some present balance and strength deficits impacting gait and  functional ability. Recommend home with family assist and Outpatient PT. No therapy indicated while inpatient, will complete orders.     Time Calculation:         PT Charges       Row Name 06/23/25 1542             Time Calculation    Start Time 1332  -OD      Stop Time 1355  -OD      Time Calculation (min) 23 min  -OD      PT Received On 06/23/25  -OD         Time Calculation- PT    Total Timed Code Minutes- PT 0 minute(s)  -OD                User Key  (r) = Recorded By, (t) = Taken By, (c) = Cosigned By      Initials Name Provider Type    OD Glenis Kruger, PT Physical Therapist                  Therapy Charges for Today       Code Description Service Date Service Provider Modifiers Qty    69167030124 HC PT EVAL MOD COMPLEXITY 4 6/23/2025 Glenis Kruger, PT GP 1            PT G-Codes  Outcome Measure Options: AM-PAC 6 Clicks Basic Mobility (PT)  AM-PAC 6 Clicks Score (PT): 20  AM-PAC 6 Clicks Score (OT): 23  PT Discharge Summary  Anticipated Discharge Disposition (PT): home with outpatient therapy services    Glenis Kruger PT  6/23/2025

## 2025-06-23 NOTE — THERAPY EVALUATION
Patient Name: Mango Ling  : 1945    MRN: [id number]                              Today's Date: 2025       Admit Date: 2025    Visit Dx:     ICD-10-CM ICD-9-CM   1. Acute saddle pulmonary embolism with acute cor pulmonale  I26.02 415.13     415.0   2. Dyspnea, unspecified type  R06.00 786.09   3. Chest pain, unspecified type  R07.9 786.50   4. Lumbosacral radiculopathy  M54.17 724.4     Patient Active Problem List   Diagnosis    BPH with urinary obstruction    Calculus, bladder    Spinal stenosis of lumbar region    Lumbosacral radiculopathy    Osteoarthritis of right glenohumeral joint    Low back pain    Degenerative spondylolisthesis    Arthropathy of lumbar facet joint    Sleep apnea    Acute saddle pulmonary embolism with acute cor pulmonale     Past Medical History:   Diagnosis Date    Alzheimer disease     Bladder stones     Hypertension     Sleep apnea     CPAP     Past Surgical History:   Procedure Laterality Date    BACK SURGERY      lower back    CARDIAC CATHETERIZATION Bilateral 2025    Procedure: Pulmonary angiography;  Surgeon: Von Buchanan MD;  Location: [facility] CATH INVASIVE LOCATION;  Service: Cardiovascular;  Laterality: Bilateral;    CYSTOSCOPY LITHOLAPAXY BLADDER STONE EXTRACTION N/A 10/30/2023    Procedure: CYSTOSCOPY LITHOLAPAXY BLADDER STONE EXTRACTION;  Surgeon: Ryan Romero MD;  Location: [facility] OR;  Service: Urology;  Laterality: N/A;    CYSTOSCOPY TRANSURETHRAL RESECTION OF PROSTATE N/A 10/30/2023    Procedure: CYSTOSCOPY TRANSURETHRAL RESECTION OF PROSTATE;  Surgeon: Ryan Romero MD;  Location: [facility] MAIN OR;  Service: Urology;  Laterality: N/A;    CYSTOSCOPY W/ LASER LITHOTRIPSY      TOTAL SHOULDER REPLACEMENT Right       General Information       Row Name 25 1335          OT Time and Intention    Document Type evaluation  -MS     Mode of Treatment occupational therapy  -MS       Row Name 25 1332          General Information     Patient Profile Reviewed yes  -MS     Prior Level of Function independent:;ADL's;all household mobility;community mobility  -MS     Existing Precautions/Restrictions fall  -MS     Barriers to Rehab cognitive status  -MS       Row Name 06/23/25 0865          Occupational Profile    Reason for Services/Referral (Occupational Profile) Pt is a 78 y/o M admitted to [facility] 6/20/25 with sided flank and L sided chest pain; hospital dx PE and LLE DVT. Pt was emergently taken for pulmonary thrombectomy 6/20/25, has been anticoagulated since. PMHx significant for Alzheimer's disease, anxiety, depression, HTN, and AGUILA. At baseline pt resides spouse in [facility] with railing. Pt typically ambulates with rollator within the home at beginning of the day, progressing to no AD for rest of the day and utilizing cane for community distance. Spouse typically drives.  -MS     Environmental Supports and Barriers (Occupational Profile) supportive family  -MS       Row Name 06/23/25 1335          Living Environment    Current Living Arrangements home  -MS     People in Home spouse  -MS       Row Name 06/23/25 1335          Home Main Entrance    Number of Stairs, Main Entrance three  -MS     Stair Railings, Main Entrance railings safe and in good condition  -MS       Row Name 06/23/25 1335          Stairs Within Home, Primary    Number of Stairs, Within Home, Primary none  -MS       Row Name 06/23/25 1335          Cognition    Orientation Status (Cognition) oriented x 4  -MS       Row Name 06/23/25 1335          Safety Issues/Impairments Affecting Functional Mobility    Impairments Affecting Function (Mobility) endurance/activity tolerance  -MS               User Key  (r) = Recorded By, (t) = Taken By, (c) = Cosigned By      Initials Name Provider Type    Nurys Dumas OT Occupational Therapist                     Mobility/ADL's       Row Name 06/23/25 0577          Bed Mobility    Bed Mobility bed mobility (all) activities  -MS     All  Activities, Freedom (Bed Mobility) unable to assess  -MS     Comment, (Bed Mobility) sitting up in chair upon arrival  -MS       Row Name 06/23/25 1454          Transfers    Transfers sit-stand transfer  -MS       Row Name 06/23/25 1454          Sit-Stand Transfer    Sit-Stand Freedom (Transfers) contact guard  -MS     Assistive Device (Sit-Stand Transfers) cane, straight;walker, front-wheeled  -MS       Row Name 06/23/25 1454          Functional Mobility    Patient was able to Ambulate yes  -MS       Row Name 06/23/25 1454          Activities of Daily Living    BADL Assessment/Intervention lower body dressing  -MS       Row Name 06/23/25 1454          Lower Body Dressing Assessment/Training    Freedom Level (Lower Body Dressing) don;doff;socks;modified independence  -MS     Position (Lower Body Dressing) supported sitting  -MS               User Key  (r) = Recorded By, (t) = Taken By, (c) = Cosigned By      Initials Name Provider Type    Nurys Dumas OT Occupational Therapist                   Obj/Interventions       Row Name 06/23/25 1454          Range of Motion Comprehensive    General Range of Motion bilateral upper extremity ROM WFL  -MS       Row Name 06/23/25 1454          Strength Comprehensive (MMT)    Comment, General Manual Muscle Testing (MMT) Assessment BUE grossly 4/5  -MS       Row Name 06/23/25 1454          Balance    Balance Assessment sitting static balance;sitting dynamic balance;standing dynamic balance;standing static balance  -MS     Static Sitting Balance modified independence  -MS     Dynamic Sitting Balance modified independence  -MS     Position, Sitting Balance supported;sitting in chair  -MS     Static Standing Balance standby assist  -MS     Dynamic Standing Balance contact guard  -MS     Position/Device Used, Standing Balance supported;cane, quad;walker, front-wheeled  -MS     Comment, Balance balance improved with use of RW  -MS               User Key  (r) =  Recorded By, (t) = Taken By, (c) = Cosigned By      Initials Name Provider Type    MS Nurys Hernandez, OT Occupational Therapist                   Goals/Plan    No documentation.                  Clinical Impression       Row Name 06/23/25 1455          Pain Assessment    Pain Location extremity  -MS     Pain Side/Orientation left;lower  -MS     Additional Documentation Pain Scale: FACES Pre/Post-Treatment (Group)  -MS       Row Name 06/23/25 1451          Pain Scale: FACES Pre/Post-Treatment    Pain: FACES Scale, Pretreatment 2-->hurts little bit  -MS     Posttreatment Pain Rating 2-->hurts little bit  -MS       Row Name 06/23/25 1450          Plan of Care Review    Plan of Care Reviewed With patient  -MS     Progress no change  -MS     Outcome Evaluation Pt is a 78 y/o M admitted to [facility] 6/20/25 with sided flank and L sided chest pain; hospital dx PE and LLE DVT. Pt was emergently taken for pulmonary thrombectomy 6/20/25, has been anticoagulated since. At baseline pt resides spouse in [facility] [address] [facility] with railing. Pt typically ambulates with rollator within the home at beginning of the day, progressing to no AD for rest of the day and utilizing cane for community distance. Spouse typically drives. Pt has walk in shower with shower seat. Pt cleared for OT by nursing, oriented x4 on RA sitting up in chair upon arrival. Pt c/o some pain in LLE. Pt completes LB ADLs sitting up in chair with mod I. Pt comes to standing with CGA and ambulates household distance with cane, demo poor use of cane and balance deficits. Pt progressed with utilizing RW, ambulating with improved balance. Pt would benefit from continued use of RW, anticipate safe dc home with spouse assistance. Please see PT eval for mobility recommendation. No further OT intervention warranted within acute setting, will complete orders.  -MS       Row Name 06/23/25 6875          Therapy Assessment/Plan (OT)    Criteria for Skilled Therapeutic Interventions Met (OT)  no;does not meet criteria for skilled intervention  -MS     Therapy Frequency (OT) evaluation only  -MS       Row Name 06/23/25 1452          Therapy Plan Review/Discharge Plan (OT)    Anticipated Discharge Disposition (OT) home with assist  -MS       Row Name 06/23/25 1452          Vital Signs    O2 Delivery Pre Treatment room air  -MS     O2 Delivery Intra Treatment room air  -MS     O2 Delivery Post Treatment room air  -MS     Pre Patient Position Sitting  -MS     Intra Patient Position Standing  -MS     Post Patient Position Sitting  -MS       Row Name 06/23/25 1453          Positioning and Restraints    Pre-Treatment Position sitting in chair/recliner  -MS     Post Treatment Position chair  -MS     In Chair notified nsg;reclined;call light within reach;encouraged to call for assist;exit alarm on;with family/caregiver  -MS               User Key  (r) = Recorded By, (t) = Taken By, (c) = Cosigned By      Initials Name Provider Type    Nurys Dumas, OT Occupational Therapist                   Outcome Measures       Row Name 06/23/25 9449          How much help from another is currently needed...    Putting on and taking off regular lower body clothing? 4  -MS     Bathing (including washing, rinsing, and drying) 3  -MS     Toileting (which includes using toilet bed pan or urinal) 4  -MS     Putting on and taking off regular upper body clothing 4  -MS     Taking care of personal grooming (such as brushing teeth) 4  -MS     Eating meals 4  -MS     AM-PAC 6 Clicks Score (OT) 23  -MS       Row Name 06/23/25 1000          How much help from another person do you currently need...    Turning from your back to your side while in flat bed without using bedrails? 4  -SE     Moving from lying on back to sitting on the side of a flat bed without bedrails? 4  -SE     Moving to and from a bed to a chair (including a wheelchair)? 3  -SE     Standing up from a chair using your arms (e.g., wheelchair, bedside chair)? 3  -SE      Climbing 3-5 steps with a railing? 3  -SE     To walk in hospital room? 3  -SE     AM-PAC 6 Clicks Score (PT) 20  -SE       Row Name 06/23/25 1459          Functional Assessment    Outcome Measure Options AM-PAC 6 Clicks Daily Activity (OT)  -MS               User Key  (r) = Recorded By, (t) = Taken By, (c) = Cosigned By      Initials Name Provider Type    SE Sharon Waldron, RN Registered Nurse    Nurys Dumas OT Occupational Therapist                    Occupational Therapy Education       Title: PT OT SLP Therapies (Done)       Topic: Occupational Therapy (Done)       Point: ADL training (Done)       Learning Progress Summary            Patient Acceptance, E,TB, VU by MS at 6/23/2025 1516                      Point: Precautions (Done)       Learning Progress Summary            Patient Acceptance, E,TB, VU by MS at 6/23/2025 1516                      Point: Body mechanics (Done)       Learning Progress Summary            Patient Acceptance, E,TB, VU by MS at 6/23/2025 1516                                      User Key       Initials Effective Dates Name Provider Type Discipline    MS 07/13/22 -  Nurys Hernandez OT Occupational Therapist OT                  OT Recommendation and Plan  Therapy Frequency (OT): evaluation only  Plan of Care Review  Plan of Care Reviewed With: patient  Progress: no change  Outcome Evaluation: Pt is a 78 y/o M admitted to [facility] 6/20/25 with sided flank and L sided chest pain; hospital dx PE and LLE DVT. Pt was emergently taken for pulmonary thrombectomy 6/20/25, has been anticoagulated since. At baseline pt resides spouse in [facility] with railing. Pt typically ambulates with rollator within the home at beginning of the day, progressing to no AD for rest of the day and utilizing cane for community distance. Spouse typically drives. Pt has walk in shower with shower seat. Pt cleared for OT by nursing, oriented x4 on RA sitting up in chair upon arrival. Pt c/o some pain in LLE. Pt  completes LB ADLs sitting up in chair with mod I. Pt comes to standing with CGA and ambulates household distance with cane, demo poor use of cane and balance deficits. Pt progressed with utilizing RW, ambulating with improved balance. Pt would benefit from continued use of RW, anticipate safe dc home with spouse assistance. Please see PT eval for mobility recommendation. No further OT intervention warranted within acute setting, will complete orders.     Time Calculation:         Time Calculation- OT       Row Name 06/23/25 1516             Time Calculation- OT    OT Start Time 1333  -MS      OT Stop Time 1356  -MS      OT Time Calculation (min) 23 min  -MS      Total Timed Code Minutes- OT 0 minute(s)  -MS      OT Received On 06/23/25  -MS                User Key  (r) = Recorded By, (t) = Taken By, (c) = Cosigned By      Initials Name Provider Type    Nurys Dumas OT Occupational Therapist                  Therapy Charges for Today       Code Description Service Date Service Provider Modifiers Qty    [id number] HC OT EVAL MOD COMPLEXITY 4 6/23/2025 Nurys Hernandez OT GO 1                 Nurys Hernandez OT  6/23/2025

## 2025-06-23 NOTE — PLAN OF CARE
Goal Outcome Evaluation:      Pt is A&O x4 however is intermittently confused. Pt also is getting up without calling the nurse, bed/chair alarm on. VSS on room air

## 2025-06-23 NOTE — CASE MANAGEMENT/SOCIAL WORK
Case Management Discharge Note      Final Note: Home - [facility] OP PT         Selected Continued Care - Discharged on 6/23/2025 Admission date: 6/20/2025 - Discharge disposition: Home or Self Care          Therapy Coordination complete.      Service Provider Services Address Phone Fax Patient Preferred    [facility] PHYSICAL THERAPY [facility] [address] [address] [phone] --                Final Discharge Disposition Code: 01 - home or self-care    MIKAEL Mike RN  ICU/CVU   O: [phone]  C: [phone]  [email or web address]

## 2025-06-23 NOTE — CASE MANAGEMENT/SOCIAL WORK
Discharge Planning Assessment   Trevon     Patient Name: Mango Ling  MRN: [id number]  Today's Date: 6/23/2025    Admit Date: 6/20/2025    Plan: Plan to dc home with spouse.   Discharge Needs Assessment       Row Name 06/23/25 1107       Living Environment    People in Home spouse    Name(s) of People in Home Vesna - spouse    Current Living Arrangements home    Potentially Unsafe Housing Conditions none    In the past 12 months has the electric, gas, oil, or water company threatened to shut off services in your home? No    Primary Care Provided by self    Provides Primary Care For no one    Family Caregiver if Needed spouse    Family Caregiver Names Vesna - spouse    Quality of Family Relationships helpful;involved;supportive    Able to Return to Prior Arrangements yes       Resource/Environmental Concerns    Resource/Environmental Concerns none    Transportation Concerns none       Transportation Needs    In the past 12 months, has lack of transportation kept you from medical appointments or from getting medications? no    In the past 12 months, has lack of transportation kept you from meetings, work, or from getting things needed for daily living? No       Food Insecurity    Within the past 12 months, you worried that your food would run out before you got the money to buy more. Never true    Within the past 12 months, the food you bought just didn't last and you didn't have money to get more. Never true       Transition Planning    Patient/Family Anticipates Transition to home with family    Patient/Family Anticipated Services at Transition none    Transportation Anticipated car, drives self;family or friend will provide       Discharge Needs Assessment    Readmission Within the Last 30 Days no previous admission in last 30 days    Equipment Currently Used at Home cpap    Concerns to be Addressed discharge planning    Anticipated Changes Related to Illness none    Equipment Needed After Discharge none                    Discharge Plan       Row Name 06/23/25 1107       Plan    Plan Plan to dc home with spouse.    Patient/Family in Agreement with Plan yes    Plan Comments CM met with patient at bedside. Patient A&Ox4. Patient lives at home with spouse who will transport at discharge. Patient performs ADLs, uses a CPAP at home. PCP and pharmacy confirmed. Agreeable to M2B.  Denies financial assistance needs for medication and/or food. Denies any current DME, HH, Caregiver, or rehab services. Eliquis $114 - wife also takes this med and the cost is the same and they can afford. DC Barriers: plan to go home today 6/23.                    Expected Discharge Date and Time       Expected Discharge Date Expected Discharge Time    Jun 24, 2025            Demographic Summary       Row Name 06/23/25 1106       General Information    Admission Type inpatient    Arrived From emergency department    Required Notices Provided Important Message from Medicare    Referral Source admission list    Reason for Consult discharge planning    Preferred Language English                   Functional Status       Row Name 06/23/25 1106       Functional Status    Usual Activity Tolerance moderate    Current Activity Tolerance moderate       Functional Status, IADL    Medications independent    Meal Preparation independent    Housekeeping independent    Laundry independent    Shopping independent       Mental Status    General Appearance WDL WDL       Mental Status Summary    Recent Changes in Mental Status/Cognitive Functioning no changes             MIKAEL Mike RN  ICU/CVU   O: [phone]  C: [phone]  [email or web address]

## 2025-06-23 NOTE — PROGRESS NOTES
Cardiology Progress Note    Patient Identification:  Name: Mango Ling  Age: 79 y.o.  Sex: male  :  1945  MRN: [id number]                 Follow Up / Chief Complaint: PE  Chief Complaint   Patient presents with    Shortness of Breath       Interval History: Patient presented with shortness of breath was found to have bilateral saddle PE with cor pulmonale.  Underwent Inari PE declot 2025.  Patient denies any chest pain or shortness of breath       Subjective: Patient seen and examined.  Chart reviewed.  Labs reviewed.  Discussed with RN taking care of patient.      Objective:  2025: Potassium was 3.9 and 3.7.  Glucose 118  2025: Hemoglobin 10.7, creatinine 1.24  2025: Hemoglobin is 11.4, creatinine 1.37.    History of present illness:      Mr. Mango Ling has PMH of     Hypertension  AGUILA  Kidney stone  Alzheimer's     Presented with complaint of worsening shortness of breath was found to have bilateral PE therefore interventional cardiology was consulted.  Patient has shortness of breath for over a year and decreased activity level and chronic pain.     Data:  2025: HS troponin is 25 and 23.  proBNP normal at 135.  CMP with a creatinine of 1.4, EGFR 51, glucose 111.  CBC with a white count of 10.8, D-dimer 4.45  CT PE study 2025: Reviewed/interpreted by me: Saddle PE and emboli in left and right main arteries extending into multiple lobar and subsegmental branches bilaterally.  RV to LV ratio is 1.2 compatible with RV strain.  Mild left lower lobe groundglass opacity compatible with developing pulmonary infarct.  Hepatic steatosis and coronary atherosclerosis  EKG 2025 reviewed/interpreted by me reveals sinus rhythm with rate of 63 bpm  Echocardiogram 2025 reviewed/interpreted by me reveals normal LV size and function EF of 5660%.  Mildly reduced RV function and mild RV enlargement and moderate mitral valve thickening        Assessment:  :     Acute saddle PE  with cor pulmonale  DVT  Hypertension  Obesity with AGUILA  Hyperglycemia  Renal insufficiency        Recommendations / Plan:            Patient presented with shortness of breath  Was diagnosed with acute bilateral saddle PE with RV strain  Went to Cath Lab emergently for PE declot  Patient has been started on Eliquis  Hemoglobin is stable.  Increase activity as tolerated.  Will continue losartan for blood pressure control.  Will monitor renal function.  Will follow-up closely in 2 weeks as outpatient in cardiology clinic.    EKG done 6/21/2025 reviewed/interpreted by me reveals sinus rhythm with rate of 65 bpm.  Continue losartan for blood pressure  Monitor renal function.  Increase activity as tolerated.    Copied text in this portion of the note has been reviewed and is accurate as of 6/23/2025    Past Medical History:  Past Medical History:   Diagnosis Date    Alzheimer disease     Bladder stones     Hypertension     Sleep apnea     CPAP     Past Surgical History:  Past Surgical History:   Procedure Laterality Date    BACK SURGERY      lower back    CYSTOSCOPY LITHOLAPAXY BLADDER STONE EXTRACTION N/A 10/30/2023    Procedure: CYSTOSCOPY LITHOLAPAXY BLADDER STONE EXTRACTION;  Surgeon: Ryna Romero MD;  Location: [facility];  Service: Urology;  Laterality: N/A;    CYSTOSCOPY TRANSURETHRAL RESECTION OF PROSTATE N/A 10/30/2023    Procedure: CYSTOSCOPY TRANSURETHRAL RESECTION OF PROSTATE;  Surgeon: Ryan Romero MD;  Location: [facility] OR;  Service: Urology;  Laterality: N/A;    CYSTOSCOPY W/ LASER LITHOTRIPSY      TOTAL SHOULDER REPLACEMENT Right         Social History:   Social History     Tobacco Use    Smoking status: Never    Smokeless tobacco: Never   Substance Use Topics    Alcohol use: Yes     Alcohol/week: 6.0 standard drinks of alcohol     Types: 6 Shots of liquor per week      Family History:  Family History   Problem Relation Age of Onset    Alzheimer's disease Father           Allergies:  No Known  "Allergies  Scheduled Meds:  apixaban, 10 mg, BID   Followed by  [START ON 6/29/2025] apixaban, 5 mg, BID  donepezil, 5 mg, BID  escitalopram, 10 mg, Daily  finasteride, 5 mg, Daily  [Held by provider] losartan, 100 mg, Q24H  memantine, 5 mg, Q12H  mupirocin, 1 Application, BID  pantoprazole, 40 mg, Q AM  sodium chloride, 10 mL, Q12H          Review of Systems:   ROS  Review of Systems   Constitution: Negative for chills and fever.   Cardiovascular: Negative for chest pain and palpitations.   Respiratory: Negative for cough and hemoptysis.    Gastrointestinal: Negative for nausea.        Constitutional:  Temp:  [97.7 °F (36.5 °C)-98.8 °F (37.1 °C)] 98.8 °F (37.1 °C)  Heart Rate:  [] 58  Resp:  [16-17] 16  BP: (105-148)/(57-80) 148/70    Physical Exam   /70   Pulse 58   Temp 98.8 °F (37.1 °C) (Oral)   Resp 16   Ht 177.8 cm (70\")   Wt 113 kg (249 lb 9 oz)   SpO2 95%   BMI 35.81 kg/m²   General:  Appears in no acute distress  Eyes: Sclera is anicteric,  conjunctiva is clear   HEENT:  No JVD. Thyroid not visibly enlarged. No mucosal pallor or cyanosis  Respiratory: Respirations regular and unlabored at rest.  Clear to auscultation  Cardiovascular: S1,S2 Regular rate and rhythm.   Gastrointestinal: Abdomen nondistended.  Musculoskeletal:  No abnormal movements  Extremities: No digital clubbing or cyanosis  Skin: Color pink.   Neuro: Alert and awake.    INTAKE AND OUTPUT:    Intake/Output Summary (Last 24 hours) at 6/23/2025 0707  Last data filed at 6/23/2025 0300  Gross per 24 hour   Intake 780 ml   Output 725 ml   Net 55 ml       Cardiographics  Telemetry: Reviewed and interpreted by me reveals sinus rhythm.    ECG:   ECG 12 Lead Rhythm Change   Final Result   HEART RATE=65  bpm   RR Junejmmm=730  ms   WV Wfzgobms=670  ms   P Horizontal Axis=-7  deg   P Front Axis=51  deg   QRSD Interval=96  ms   QT Gfrkpjqv=467  ms   KKbM=558  ms   QRS Axis=23  deg   T Wave Axis=-19  deg   - NORMAL ECG -   Sinus " rhythm   When compared with ECG of 20-Jun-2025 09:49:05,   No significant change   Electronically Signed By: Von Buchanan (CHYNA) 2025-06-22 23:16:22   Date and Time of Study:2025-06-21 08:39:54      ECG 12 Lead Dyspnea   Final Result   HEART RATE=63  bpm   RR Ufcaylrg=207  ms   ID Bdksinha=189  ms   P Horizontal Axis=9  deg   P Front Axis=39  deg   QRSD Interval=94  ms   QT Okvrnyao=245  ms   DVkK=233  ms   QRS Axis=25  deg   T Wave Axis=41  deg   - NORMAL ECG -   Sinus rhythm   When compared with ECG of 19-Oct-2023 10:25:59,   No significant change   Electronically Signed By: Mango Joseph (CHYNA) 2025-06-21 09:20:35   Date and Time of Study:2025-06-20 09:49:05        I have personally reviewed EKG    Echocardiogram: Results for orders placed during the hospital encounter of 06/20/25    ADULT TRANSTHORACIC ECHO COMPLETE W/ CONT IF NECESSARY PER PROTOCOL    Interpretation Summary    Left ventricular ejection fraction appears to be 56 - 60%.    Mildly reduced right ventricular systolic function noted.    The right ventricular cavity is mildly dilated.    There is moderate, bileaflet mitral valve thickening present.      STRESS TEST      HEART CATHETERIZATION  Results for orders placed during the hospital encounter of 06/20/25    Cardiac Catheterization/Vascular Study    Conclusion  PULMONARY ANGIOGRAPHY AND MECHANICAL/Inari THROMBECTOMY REPORT:    Date of procedure: 6/20/2025    Indication:  Submassive pulmonary embolus, with RV enlargement, elevated troponin and lower extremity DVT    Procedure performed:  -Cath placement in IVC  -Right heart cath 09292  -Selective cath placement in left PA  -Selective cath placement in the right PA  -Extirpation of matter from bilateral PA via mechanical thrombectomy 37184 x 2  -Selective left pulmonary artery angiogram 67276  -Selective right pulmonary artery angiogram 54669  -Catheter placement and [place] [address] left upper lobe apical  posterior segment branch , left lower lateral segment branch, left lower lobe posterior basal segment branch.  -Catheter placement and angiographyand extirpation of matter of in right lower lobe anterior basal segment, right interlobar PA, truncus anterior, right upper lobe apical segment branch  Right and left pulmonary artery thrombectomy.    Description of procedure:    Risk benefits alternatives of the procedure were explained to the patient in CCU at length and answered all his questions.  The patient was brought to the cath lab.   Patient was prepped and draped in the usual manner.  Timeout was done.  Lidocaine was infused in the right groin for anesthesia. Access was obtained in the right femoral vein using a micropuncture technique. A 035 wire and 6 Belizean sheath was placed.  Then the sheath was dilated up to 24 [place]an  sheath.  This was placed in IVC..  6 Belizean 035 Stollings-Robby was advanced into left PA and a Amplatz superstiff wire was placed in left lower lobe posterior basal segment branch.  Then a 24 sheath Triever was advanced into the left main PA.  Angiography was obtained.  Then mechanical aspiration suction thrombectomy/extirpation of matter was performed in the left upper lobe apical posterior segment branch left lower lobe lateral segment branch and left lower lobe posterior basal segment branch, with the help of triever 20 curve sheath to direct into each 1 of these branches.  Then post angiography was done to make sure clot was resolved.  Then the retriever 24 sheath was directed towards the right PA with the help of a JR4 diagnostic catheter and 035 wire.  Angiography was obtained in the main PA and branches individually.  Mechanical aspiration suction thrombectomy/extubation of matter was performed right lower lobe anterior basal segment, right interlobar lower pulmonary artery, truncus anterior, right upper lobe apical segment branch.  Then post angiography was performed to make sure clot was  resolved.    Significant amount of thrombus was retrieved from both left and right side.  Patient's oxygenation improved significantly post procedure compared to preprocedure.  Heart rate also decreased and improved post procedure.    PA pressures were measured after the procedure and improved.    Estimated blood loss was 80 cc    Intravenous heparin was used as anticoagulation and therapeutic ACT's were maintained throughout the procedure.  Postprocedure the sheath was removed and figure-of-eight suture was applied to subcutaneous tissue for hemostasis.  The suture will be removed in 2 hours    Patient tolerated the procedure well.  Immediate complications were none.        Hemodynamics:    1.  Baseline PA pressures : Patient's baseline zeroing was not accurate since the transducer connection was loose.  22/3    2.  POST THROMBECTOMY PA : 37/7    Conclusion: Bilateral pulmonary emboli.  Technically successful bilateral pulmonary artery thrombectomy/extirpation of matter.    Recommendation:  Patient was given intravenous heparin during the procedure to keep ACT is over 300.  We will stop IV heparin  Will start on Eliquis DVT PE protocol      Lab Review   I have reviewed the labs  Results from last 7 days   Lab Units 06/20/25  1145 06/20/25  1015   HSTROP T ng/L 23* 25*     Results from last 7 days   Lab Units 06/23/25  0329   MAGNESIUM mg/dL 2.1     Results from last 7 days   Lab Units 06/23/25  0329   SODIUM mmol/L 135*   POTASSIUM mmol/L 3.8   BUN mg/dL 14.7   CREATININE mg/dL 1.37*   CALCIUM mg/dL 9.0         Results from last 7 days   Lab Units 06/23/25  0329 06/22/25  0355 06/21/25  0407   WBC 10*3/mm3 11.16* 10.93* 13.08*   HEMOGLOBIN g/dL 11.4* 10.7* 13.4   HEMATOCRIT % 35.3* 33.1* 41.2   PLATELETS 10*3/mm3 234 205 252     Results from last 7 days   Lab Units 06/21/25  1816 06/20/25  1015   INR   --  1.11*   APTT seconds 82.0* 29.9       RADIOLOGY:  Imaging Results (Last 24 Hours)       ** No results found  "for the last 24 hours. **                  )6/23/2025  MD NEHA Byrd/Transcription:   \"Dictated utilizing Dragon dictation\".   "

## 2025-06-23 NOTE — CASE MANAGEMENT/SOCIAL WORK
Continued Stay Note   Trevon     Patient Name: Mango Ling  MRN: [id number]  Today's Date: 6/23/2025    Admit Date: 6/20/2025    Plan: Plan to dc home with spouse and [facility] OP PT (ordered)   Discharge Plan       Row Name 06/23/25 1501       Plan    Plan Plan to dc home with spouse and [facility] OP PT (ordered)    Plan Comments CM met with patient/spouse at bedside, agreeable to [facility] OP PT, ordered by MD, referral in basket, facility notified, accepted-they will call pt to set up appts.               Expected Discharge Date and Time       Expected Discharge Date Expected Discharge Time    Jun 23, 2025               MIKAEL Mike RN  ICU/CVU   O: [phone]  C: [phone]  [email or web address]

## 2025-06-23 NOTE — DISCHARGE PLACEMENT REQUEST
"Marco Ling (79 y.o. Male)       Date of Birth   1945    Social Security Number       Address   [address] [place] [address] [place] [address]    Home Phone   [phone]    MRN   [id number]       Episcopal   None    Marital Status                               Admission Date   6/20/2025    Admission Type   Emergency    Admitting Provider   [place] Joseph DO    Attending Provider   William Maldonado MD    Department, Room/Bed   [facility] INTENSIVE CARE UNIT, 2304/1       Discharge Date       Discharge Disposition   [facility]    Discharge Destination                                 Attending Provider: William Maldonado MD    Allergies: No Known Allergies    Isolation: None   Infection: None   Code Status: CPR    Ht: 177.8 cm (70\")   Wt: 113 kg (249 lb 9 oz)    Admission Cmt: None   Principal Problem: Acute saddle pulmonary embolism with acute cor pulmonale [I26.02]                   Active Insurance as of 6/20/2025       Primary Coverage       Payor Plan Insurance Group Employer/Plan Group    MEDICARE MEDICARE A & B        Payor Plan Address Payor Plan Phone Number Payor Plan Fax Number Effective Dates    [address] [phone]  3/1/2011 - None Entered    [facility] [id number]         Subscriber Name Subscriber Birth Date Member ID       MARCO LING 1945 [id number]               Secondary Coverage       Payor Plan Insurance Group Employer/Plan Group    [facility] SUPP [id number]       Payor Plan Address Payor Plan Phone Number Payor Plan Fax Number Effective Dates    [address]   11/1/2023 - None Entered    [facility] [id number]         Subscriber Name Subscriber Birth Date Member ID       MARCO LING 1945 [id number]                     Emergency Contacts        (Rel.) Home Phone Work Phone Mobile Phone    Vesna Ling (Spouse) -- -- [phone]                "

## 2025-06-23 NOTE — PLAN OF CARE
Goal Outcome Evaluation:  Plan of Care Reviewed With: patient        Progress: no change  Outcome Evaluation: Pt is a 78 y/o M admitted to [facility] 6/20/25 with sided flank and L sided chest pain; hospital dx PE and LLE DVT. Pt was emergently taken for pulmonary thrombectomy 6/20/25, has been anticoagulated since. At baseline pt resides spouse in [facility] [address] [facility] with railing. Pt typically ambulates with rollator within the home at beginning of the day, progressing to no AD for rest of the day and utilizing cane for community distance. Spouse typically drives. Pt has walk in shower with shower seat. Pt cleared for OT by nursing, oriented x4 on RA sitting up in chair upon arrival. Pt c/o some pain in LLE. Pt completes LB ADLs sitting up in chair with mod I. Pt comes to standing with CGA and ambulates household distance with cane, demo poor use of cane and balance deficits. Pt progressed with utilizing RW, ambulating with improved balance. Pt would benefit from continued use of RW, anticipate safe dc home with spouse assistance. Please see PT eval for mobility recommendation. No further OT intervention warranted within acute setting, will complete orders.    Anticipated Discharge Disposition (OT): home with assist

## 2025-06-23 NOTE — PLAN OF CARE
Goal Outcome Evaluation:  Plan of Care Reviewed With: patient, spouse        Progress: improving  Outcome Evaluation: Mango Ling is a 78 y/o M admitted to [facility] on 6/20/25 for L-sided flank and chest pain. D-dimer 4.45. CXR (+) acute saddle PE with acute cor pulmonale. Doppler (+) acute LLE DVT. Started on IV heparin, transitioned to oral anticoag 6/21. S/p pulmonary angiography with mechanical thrombectomy. PMH includes Alzheimer's disease, glaucoma, OAB. At baseline, pt lives with spouse in [facility] with 3STE. Pt typically ambulates with rollator within home at beginning of the day, progresses to no AD towards the end. Pt uses a cane for community mobility. Pt's spouse reports pt recently completed a bout of PT, but other than that has had reduced exercise/activity. This date, pt is AAOx4 and up in recliner upon arrival. Pt requires SBA for transfers and CGA-Brigitte for ambulation 2 x 100ft, requiring assist for turns due to impulsivity and reduced ability to sequence. First bout of ambulation pt utilized cane, however without any functional use (dragging behind him) and likely is more of a hazard. Second bout of ambulation, pt utilized a RW, with visual and verbal cues on how to use and how to turn. Pt appears near baseline function, but has some present balance and strength deficits impacting gait and functional ability. Recommend home with family assist and Outpatient PT. No therapy indicated while inpatient, will complete orders.    Anticipated Discharge Disposition (PT): home with outpatient therapy services

## 2025-06-24 NOTE — TELEPHONE ENCOUNTER
Used patient's AVS to clarify medication administration    * Eliquis DVT/PE Starter Pack tablet  therapy pack  Take two 5 mg tablets by mouth every 12 hours  for 7 days. Followed by one 5 mg tablet every 12  hours. (Dispense starter pack if available)  Generic drug: Apixaban Starter Pack  For: DVT/PE (active thrombosis)  Last time this was given: Ask your nurse or doctor  Signed by: Isac Benitez  Take two 5 mg tablets by mouth every 12 hours for 7 days.  Followed by one 5 mg tablet every 12 hours.  Reason for Disposition  • Caller has medicine question only, adult not sick, AND triager answers question    Additional Information  • Negative: [1] Intentional drug overdose AND [2] suicidal thoughts or ideas  • Negative: Drug overdose and triager unable to answer question  • Negative: Caller requesting a renewal or refill of a medicine patient is currently taking  • Negative: Caller requesting information unrelated to medicine  • Negative: Caller requesting information about COVID-19 Vaccine  • Negative: Caller requesting information about Emergency Contraception  • Negative: Caller requesting information about Combined Birth Control Pills  • Negative: Caller requesting information about Progestin Birth Control Pills  • Negative: Caller requesting information about Post-Op pain or medicines  • Negative: Caller requesting a prescription antibiotic (such as Penicillin) for Strep throat and has a positive culture result  • Negative: Caller requesting a prescription anti-viral med (such as Tamiflu) and has influenza (flu) symptoms  • Negative: Immunization reaction suspected  • Negative: Rash while taking a medicine or within 3 days of stopping it  • Negative: [1] Asthma and [2] having symptoms of asthma (cough, wheezing, etc.)  • Negative: [1] Symptom of illness (e.g., headache, abdominal pain, earache, vomiting) AND [2] more than mild  • Negative: Breastfeeding questions about mother's medicines and diet  • Negative: MORE  THAN A DOUBLE DOSE of a prescription or over-the-counter (OTC) drug  • Negative: [1] DOUBLE DOSE (an extra dose or lesser amount) of prescription drug AND [2] any symptoms (e.g., dizziness, nausea, pain, sleepiness)  • Negative: [1] DOUBLE DOSE (an extra dose or lesser amount) of over-the-counter (OTC) drug AND [2] any symptoms (e.g., dizziness, nausea, pain, sleepiness)  • Negative: Took another person's prescription drug  • Negative: [1] DOUBLE DOSE (an extra dose or lesser amount) of prescription drug AND [2] NO symptoms  (Exception: A double dose of antibiotics.)  • Negative: Diabetes drug error or overdose (e.g., took wrong type of insulin or took extra dose)  • Negative: [1] Prescription not at pharmacy AND [2] was prescribed by PCP recently (Exception: Triager has access to EMR and prescription is recorded there. Go to Home Care and confirm for pharmacy.)  • Negative: [1] Pharmacy calling with prescription question AND [2] triager unable to answer question  • Negative: [1] Caller has URGENT medicine question about med that PCP or specialist prescribed AND [2] triager unable to answer question  • Negative: Medicine patch causing local rash or itching  • Negative: [1] Caller has medicine question about med NOT prescribed by PCP AND [2] triager unable to answer question (e.g., compatibility with other med, storage)  • Negative: Prescription request for new medicine (not a refill)  • Negative: [1] Caller has NON-URGENT medicine question about med that PCP prescribed AND [2] triager unable to answer question  • Negative: Caller wants to use a complementary or alternative medicine  • Negative: [1] Prescription prescribed recently is not at pharmacy AND [2] triager has access to patient's EMR AND [3] prescription is recorded in the EMR  • Negative: [1] DOUBLE DOSE (an extra dose or lesser amount) of over-the-counter (OTC) drug AND [2] NO symptoms  • Negative: [1] DOUBLE DOSE (an extra dose or lesser amount) of  "antibiotic drug AND [2] NO symptoms    Answer Assessment - Initial Assessment Questions  1. NAME of MEDICINE: \"What medicine(s) are you calling about?\"      eliquis  2. QUESTION: \"What is your question?\" (e.g., double dose of medicine, side effect)      When to take meds again and how often  3. PRESCRIBER: \"Who prescribed the medicine?\" Reason: if prescribed by specialist, call should be referred to that group.      hospital  4. SYMPTOMS: \"Do you have any symptoms?\" If Yes, ask: \"What symptoms are you having?\"  \"How bad are the symptoms (e.g., mild, moderate, severe)      denies  5. PREGNANCY:  \"Is there any chance that you are pregnant?\" \"When was your last menstrual period?\"      na    Protocols used: Medication Question Call-ADULT-    "

## 2025-06-29 DIAGNOSIS — R41.3 MEMORY LOSS: ICD-10-CM

## 2025-06-30 ENCOUNTER — OFFICE VISIT (OUTPATIENT)
Dept: CARDIOLOGY | Facility: CLINIC | Age: 80
End: 2025-06-30
Payer: MEDICARE

## 2025-06-30 VITALS
OXYGEN SATURATION: 98 % | BODY MASS INDEX: 34.07 KG/M2 | SYSTOLIC BLOOD PRESSURE: 138 MMHG | HEIGHT: 70 IN | DIASTOLIC BLOOD PRESSURE: 78 MMHG | HEART RATE: 83 BPM | WEIGHT: 238 LBS

## 2025-06-30 DIAGNOSIS — R06.09 DYSPNEA ON EXERTION: Primary | ICD-10-CM

## 2025-06-30 DIAGNOSIS — E66.9 OBESITY (BMI 30-39.9): ICD-10-CM

## 2025-06-30 DIAGNOSIS — Z79.01 LONG TERM (CURRENT) USE OF ANTICOAGULANTS: ICD-10-CM

## 2025-06-30 DIAGNOSIS — I10 ESSENTIAL HYPERTENSION: ICD-10-CM

## 2025-06-30 DIAGNOSIS — Z86.711 HISTORY OF PULMONARY EMBOLUS (PE): ICD-10-CM

## 2025-06-30 RX ORDER — MEMANTINE HYDROCHLORIDE 5 MG/1
TABLET ORAL
Qty: 60 TABLET | Refills: 4 | Status: SHIPPED | OUTPATIENT
Start: 2025-06-30

## 2025-06-30 NOTE — PROGRESS NOTES
Subjective:     Encounter Date:06/30/2025      Patient ID: Mango Ling is a 79 y.o. male.    Chief Complaint and history of present illness:    Follow-up for PE, hypertension, AGUILA     History of present illness:     Mr. 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  Patient was recently in the hospital with PE and underwent Inari thrombectomy is here for follow-up.  Denies any chest pain or shortness of breath.  Is tolerating anticoagulation without issues.  Patient has been having dyspnea on exertion for over a year now with decreasing exercise capacity.    Patient's arterial blood pressure is 138/78, heart rate 83 bpm, O2 sat of 98% on room air.  BMI is over 34.    Data:  6/20/2025: HS troponin is 25 and 23.  proBNP normal at 135.  CMP with a creatinine of 1.4, EGFR 51, glucose 111.  CBC with a white count of 10.8, D-dimer 4.45  CT PE study 6/20/2025: Reviewed/interpreted by me: Saddle PE and emboli in left and right main arteries extending into multiple lobar and subsegmental branches bilaterally.  RV to LV ratio is 1.2 compatible with RV strain.  Mild left lower lobe groundglass opacity compatible with developing pulmonary infarct.  Hepatic steatosis and coronary atherosclerosis  EKG 6/20/2025 reviewed/interpreted by me reveals sinus rhythm with rate of 63 bpm  Echocardiogram 6/20/2025 reviewed/interpreted by me reveals normal LV size and function EF of 56-60%.  Mildly reduced RV function and mild RV enlargement and moderate mitral valve thickening        Labs 6/23/2025 reveal CMP with a creatinine of 1.37, EGFR 52, glucose 112.  Labs from 6/21/2025 reveal lipid profile with cholesterol 199, triglycerides 117, HDL 49,    6/21/2025: Potassium was 3.9 and 3.7.  Glucose 118  6/22/2025: Hemoglobin 10.7, creatinine 1.24  6/23/2025: Hemoglobin is 11.4, creatinine 1.37.     Assessment:  :      Dyspnea on exertion  PE with cor pulmonale-s/p thrombi  DVT  Hypertension  Obesity with AGUILA  Hyperglycemia  Renal insufficiency        Recommendations / Plan:         Patient recently had acute bilateral PE with RV strain underwent Inari thrombectomy.  He is on long-term Eliquis  Patient continues to have dyspnea on exertion.  Patient has been having dyspnea on exertion for a long time now.  Will follow-up valve at least 3 months before we will do ischemic workup.  Patient has hypertension and blood pressure is well-controlled on losartan we will continue the same.  Reviewed BMI of 30, counseled on weight loss diet and exercise.  Follow-up with PMD for labs.             Procedures    EKG 6/21/2025 reviewed/interpreted by me reveals sinus rhythm with rate of 65 beats per    ECHOCARDIOGRAM:  Results for orders placed during the hospital encounter of 06/20/25    ADULT TRANSTHORACIC ECHO COMPLETE W/ CONT IF NECESSARY PER PROTOCOL 06/20/2025  1:54 PM    Interpretation Summary    Left ventricular ejection fraction appears to be 56 - 60%.    Mildly reduced right ventricular systolic function noted.    The right ventricular cavity is mildly dilated.    There is moderate, bileaflet mitral valve thickening present.      STRESS TEST          HEART CATHETERIZATION  Results for orders placed during the hospital encounter of 06/20/25    Cardiac Catheterization/Vascular Study    Conclusion  PULMONARY ANGIOGRAPHY AND MECHANICAL/Inari THROMBECTOMY REPORT:    Date of procedure: 6/20/2025    Indication:  Submassive pulmonary embolus, with RV enlargement, elevated troponin and lower extremity DVT    Procedure performed:  -Cath placement in IVC  -Right heart cath 62284  -Selective cath placement in left PA  -Selective cath placement in the right PA  -Extirpation of matter from bilateral PA via mechanical thrombectomy 37184 x 2  -Selective left pulmonary artery angiogram 76291  -Selective right pulmonary artery angiogram  63372  -Catheter placement and [place] [address] left upper lobe apical posterior segment branch , left lower lateral segment branch, left lower lobe posterior basal segment branch.  -Catheter placement and angiographyand extirpation of matter of in right lower lobe anterior basal segment, right interlobar PA, truncus anterior, right upper lobe apical segment branch  Right and left pulmonary artery thrombectomy.    Description of procedure:    Risk benefits alternatives of the procedure were explained to the patient in CCU at length and answered all his questions.  The patient was brought to the cath lab.   Patient was prepped and draped in the usual manner.  Timeout was done.  Lidocaine was infused in the right groin for anesthesia. Access was obtained in the right femoral vein using a micropuncture technique. A 035 wire and 6 [place] sheath was placed.  Then the sheath was dilated up to 24 [place]  sheath.  This was placed in IVC..  6 [place] 035 [place] was advanced into left PA and a Amplatz superstiff wire was placed in left lower lobe posterior basal segment branch.  Then a 24 sheath Triever was advanced into the left main PA.  Angiography was obtained.  Then mechanical aspiration suction thrombectomy/extirpation of matter was performed in the left upper lobe apical posterior segment branch left lower lobe lateral segment branch and left lower lobe posterior basal segment branch, with the help of triever 20 curve sheath to direct into each 1 of these branches.  Then post angiography was done to make sure clot was resolved.  Then the retriever 24 sheath was directed towards the right PA with the help of a JR4 diagnostic catheter and 035 wire.  Angiography was obtained in the main PA and branches individually.  Mechanical aspiration suction thrombectomy/extubation of matter was performed right lower lobe anterior basal segment, right interlobar lower pulmonary artery, truncus  anterior, right upper lobe apical segment branch.  Then post angiography was performed to make sure clot was resolved.    Significant amount of thrombus was retrieved from both left and right side.  Patient's oxygenation improved significantly post procedure compared to preprocedure.  Heart rate also decreased and improved post procedure.    PA pressures were measured after the procedure and improved.    Estimated blood loss was 80 cc    Intravenous heparin was used as anticoagulation and therapeutic ACT's were maintained throughout the procedure.  Postprocedure the sheath was removed and figure-of-eight suture was applied to subcutaneous tissue for hemostasis.  The suture will be removed in 2 hours    Patient tolerated the procedure well.  Immediate complications were none.        Hemodynamics:    1.  Baseline PA pressures : Patient's baseline zeroing was not accurate since the transducer connection was loose.  22/3    2.  POST THROMBECTOMY PA : 37/7    Conclusion: Bilateral pulmonary emboli.  Technically successful bilateral pulmonary artery thrombectomy/extirpation of matter.    Recommendation:  Patient was given intravenous heparin during the procedure to keep ACT is over 300.  We will stop IV heparin  Will start on Eliquis DVT PE protocol      Copied text in this portion of the note has been reviewed and is accurate as of 7/2/2025  The following portions of the patient's history were reviewed and updated as appropriate: allergies, current medications, past family history, past medical history, past social history, past surgical history and problem list.    Assessment:         MDM       Diagnosis Plan   1. Dyspnea on exertion        2. History of pulmonary embolus (PE)        3. Long term (current) use of anticoagulants        4. Essential hypertension        5. Obesity (BMI 30-39.9)               Plan:               Past Medical History:  Past Medical History:   Diagnosis Date    Alzheimer disease     Bladder  stones     Hypertension     Sleep apnea     CPAP     Past Surgical History:  Past Surgical History:   Procedure Laterality Date    BACK SURGERY      lower back    CARDIAC CATHETERIZATION Bilateral 6/20/2025    Procedure: Pulmonary angiography;  Surgeon: Von Buchanan MD;  Location: [facility] CATH INVASIVE LOCATION;  Service: Cardiovascular;  Laterality: Bilateral;    CYSTOSCOPY LITHOLAPAXY BLADDER STONE EXTRACTION N/A 10/30/2023    Procedure: CYSTOSCOPY LITHOLAPAXY BLADDER STONE EXTRACTION;  Surgeon: Ryan Romero MD;  Location: [facility] MAIN OR;  Service: Urology;  Laterality: N/A;    CYSTOSCOPY TRANSURETHRAL RESECTION OF PROSTATE N/A 10/30/2023    Procedure: CYSTOSCOPY TRANSURETHRAL RESECTION OF PROSTATE;  Surgeon: Ryan Romero MD;  Location: [facility] MAIN OR;  Service: Urology;  Laterality: N/A;    CYSTOSCOPY W/ LASER LITHOTRIPSY      TOTAL SHOULDER REPLACEMENT Right       Allergies:  No Known Allergies  Home Meds:  Current Meds:     Current Outpatient Medications:     [START ON 7/23/2025] apixaban (ELIQUIS) 5 MG tablet tablet, Take 1 tablet by mouth 2 (Two) Times a Day., Disp: 60 tablet, Rfl: 5    donepezil (ARICEPT) 5 MG tablet, TAKE 1 TABLET BY MOUTH TWICE DAILY, Disp: 60 tablet, Rfl: 0    finasteride (PROSCAR) 5 MG tablet, , Disp: , Rfl:     Gemtesa 75 MG tablet, , Disp: , Rfl:     latanoprost (XALATAN) 0.005 % ophthalmic solution, , Disp: , Rfl:     losartan (COZAAR) 100 MG tablet, , Disp: , Rfl:     memantine (NAMENDA) 5 MG tablet, TAKE 1 TABLET BY MOUTH EVERY DAY FOR ONE MONTH THEN ONE TABLET TWICE DAILY, Disp: 60 tablet, Rfl: 4    pantoprazole (PROTONIX) 40 MG EC tablet, , Disp: , Rfl:     timolol (TIMOPTIC) 0.5 % ophthalmic solution, , Disp: , Rfl:     triamterene-hydrochlorothiazide (MAXZIDE-25) 37.5-25 MG per tablet, , Disp: , Rfl:     vitamin B-12 (CYANOCOBALAMIN) 1000 MCG tablet, Take 1 tablet by mouth Daily., Disp: , Rfl:   Social History:   Social History     Tobacco Use    Smoking status:  "Never     Passive exposure: Never    Smokeless tobacco: Never   Substance Use Topics    Alcohol use: Yes     Alcohol/week: 6.0 standard drinks of alcohol     Types: 6 Shots of liquor per week      Family History:  Family History   Problem Relation Age of Onset    Alzheimer's disease Father               Review of Systems   Cardiovascular:  Negative for chest pain, leg swelling and palpitations.   Respiratory:  Negative for shortness of breath.    Neurological:  Negative for dizziness and numbness.     All other systems are negative         Objective:     Physical Exam  /78 (BP Location: Left arm, Patient Position: Sitting, Cuff Size: Large Adult)   Pulse 83   Ht 177.8 cm (70\")   Wt 108 kg (238 lb)   SpO2 98%   BMI 34.15 kg/m²   General:  Appears in no acute distress  Eyes: Sclera is anicteric,  conjunctiva is clear   HEENT:  No JVD.  No carotid bruits  Respiratory: Respirations regular and unlabored at rest.  Clear to auscultation  Cardiovascular: S1,S2 Regular rate and rhythm. .   Extremities: No digital clubbing or cyanosis, no edema  Skin: Color pink. Skin warm and dry to touch. No rashes  No xanthoma  Neuro: Alert and awake.    Lab Reviewed:         Von Buchanan MD  7/2/2025 18:00 EDT      EMR Dragon/Transcription:   \"Dictated utilizing Dragon dictation\".        "

## 2025-07-15 ENCOUNTER — TELEPHONE (OUTPATIENT)
Dept: PHYSICAL THERAPY | Facility: CLINIC | Age: 80
End: 2025-07-15

## 2025-07-15 NOTE — TELEPHONE ENCOUNTER
Caller: Vesna Ling    Relationship: Emergency Contact       What was the call regarding: STOMACH ISSUES     Is it okay if the provider responds through MyChart:

## 2025-07-23 NOTE — PROGRESS NOTES
"Chief Complaint  Memory Loss    Subjective          Mango Ling presents to [facility] NEUROLOGY for MEMORY  History of Present Illness  Follow up on memory he currently takes aricept 5 mg 1 bid, and namenda 5 mg 1 bid      Memory patient states is the same.     Maximum   Score Patient's   Score Questions   5 1 \"What is the year?Season?Date?Day of the week?Month?\"   5 2 \"Where are we now: State?County?Town/city?Hospital?Floor?\"   3 0 3 Unrelated objects Number of trials:___   5 5 Count backward from 100 by sevens or spell WORLD backwards   3 0 Name 3 things from above   2 2 Identify 2 objects   1 1 Repeat the phrase: No ifs, ands,or buts.   3 1 Take paper in right hand, fold it in half, and put it on the floor.   1 1 Please read this and do what it says. \"Close your eyes\"   1 0 Make up and write a sentence about anything. Noun and verb   1 0 Copy this picture 10 angles must be present.   30 13 Total MMSE         ====PREV. OV 12/10/24=====  Follow up on memory he currently takes aricept 5 mg 1 bid  Gradual onset memory impairment over past 5 years      Drives some , needs help with directions     Maximum   Score Patient's   Score Questions   5 4 \"What is the year?Season?Date?Day of the week?Month?\"   5 1 \"Where are we now: State?County?Town/city?Hospital?Floor?\"   3    3 3 Unrelated objects Number of trials:___   5 5 Count backward from 100 by sevens or spell WORLD backwards   3 0 Name 3 things from above   2 2 Identify 2 objects   1 0 Repeat the phrase: No ifs, ands,or buts.   3 2 Take paper in right hand, fold it in half, and put it on the floor.   1 1 Please read this and do what it says. \"Close your eyes\"   1 1 Make up and write a sentence about anything. Noun and verb   1 0 Copy this picture 10 angles must be present.   30 19 Total MMSE                      Current Outpatient Medications:     apixaban (ELIQUIS) 5 MG tablet tablet, Take 1 tablet by mouth 2 (Two) Times a Day., Disp: 60 " "tablet, Rfl: 5    donepezil (ARICEPT) 5 MG tablet, Take 1 tablet by mouth 2 (Two) Times a Day., Disp: 180 tablet, Rfl: 3    finasteride (PROSCAR) 5 MG tablet, , Disp: , Rfl:     Gemtesa 75 MG tablet, , Disp: , Rfl:     latanoprost (XALATAN) 0.005 % ophthalmic solution, , Disp: , Rfl:     losartan (COZAAR) 100 MG tablet, , Disp: , Rfl:     memantine (NAMENDA) 10 MG tablet, Take 1 tablet by mouth 2 (Two) Times a Day. TAKE 1 TABLET BY MOUTH EVERY DAY FOR ONE MONTH THEN ONE TABLET TWICE DAILY, Disp: 180 tablet, Rfl: 3    pantoprazole (PROTONIX) 40 MG EC tablet, , Disp: , Rfl:     timolol (TIMOPTIC) 0.5 % ophthalmic solution, , Disp: , Rfl:     triamterene-hydrochlorothiazide (MAXZIDE-25) 37.5-25 MG per tablet, , Disp: , Rfl:     vitamin B-12 (CYANOCOBALAMIN) 1000 MCG tablet, Take 1 tablet by mouth Daily., Disp: , Rfl:     Review of Systems   Constitutional:  Negative for fatigue.   Neurological:  Negative for speech difficulty.   Psychiatric/Behavioral:  Negative for sleep disturbance.    All other systems reviewed and are negative.         Objective:    Vital Signs:   /78   Pulse 81   Ht 177.8 cm (70\")   Wt 110 kg (242 lb)   BMI 34.72 kg/m²     Physical Exam  Vitals reviewed.   Constitutional:       Appearance: Normal appearance.   Pulmonary:      Effort: Pulmonary effort is normal.   Neurological:      General: No focal deficit present.      Mental Status: He is alert and oriented to person, place, and time.   Psychiatric:         Mood and Affect: Mood normal.         Behavior: Behavior normal.        Result Review :                Neurological Exam  Mental Status  Alert. Oriented to person, place, and time.        Assessment and Plan    Diagnoses and all orders for this visit:    1. Memory loss (Primary)  -     memantine (NAMENDA) 10 MG tablet; Take 1 tablet by mouth 2 (Two) Times a Day. TAKE 1 TABLET BY MOUTH EVERY DAY FOR ONE MONTH THEN ONE TABLET TWICE DAILY  Dispense: 180 tablet; Refill: 3  -     " donepezil (ARICEPT) 5 MG tablet; Take 1 tablet by mouth 2 (Two) Times a Day.  Dispense: 180 tablet; Refill: 3    2. Obstructive sleep apnea syndrome  Overview:  CPAP         Continue Namenda and Aricept,  increase Namenda   Encourage exercise, mental and physical     Continue cpap       Follow Up   Return in about 1 year (around 7/29/2026).  Patient was given instructions and counseling regarding his condition or for health maintenance advice. Please see specific information pulled into the AVS if appropriate.     This document has been electronically signed by Joseph Seipel, MD on July 29, 2025 14:48 EDT

## 2025-07-24 ENCOUNTER — TELEPHONE (OUTPATIENT)
Dept: CARDIOLOGY | Facility: CLINIC | Age: 80
End: 2025-07-24
Payer: MEDICARE

## 2025-07-24 NOTE — TELEPHONE ENCOUNTER
Patient is on Eliquis for Pulmonary Embolus and this cannot be stopped.     I would either recommend holding the day of the procedure only and resuming the day after if that is enough time for them.  OR bridging with lovenox.  Can you reach out to this office please.

## 2025-07-24 NOTE — TELEPHONE ENCOUNTER
FACILITY: [facility]  MELINDA Magallanes   PHONE:   FAX: [phone]  PROCEDURE: dental extractions and implants  SCHEDULED: 8/2025  MEDS TO HOLD: Eliquis

## 2025-07-29 ENCOUNTER — OFFICE VISIT (OUTPATIENT)
Dept: NEUROLOGY | Facility: CLINIC | Age: 80
End: 2025-07-29
Payer: MEDICARE

## 2025-07-29 VITALS
BODY MASS INDEX: 34.65 KG/M2 | HEART RATE: 81 BPM | SYSTOLIC BLOOD PRESSURE: 112 MMHG | WEIGHT: 242 LBS | HEIGHT: 70 IN | DIASTOLIC BLOOD PRESSURE: 78 MMHG

## 2025-07-29 DIAGNOSIS — G47.33 OBSTRUCTIVE SLEEP APNEA SYNDROME: ICD-10-CM

## 2025-07-29 DIAGNOSIS — R41.3 MEMORY LOSS: Primary | ICD-10-CM

## 2025-07-29 PROCEDURE — 1159F MED LIST DOCD IN RCRD: CPT | Performed by: PSYCHIATRY & NEUROLOGY

## 2025-07-29 PROCEDURE — 99214 OFFICE O/P EST MOD 30 MIN: CPT | Performed by: PSYCHIATRY & NEUROLOGY

## 2025-07-29 PROCEDURE — 1160F RVW MEDS BY RX/DR IN RCRD: CPT | Performed by: PSYCHIATRY & NEUROLOGY

## 2025-07-29 RX ORDER — DONEPEZIL HYDROCHLORIDE 5 MG/1
5 TABLET, FILM COATED ORAL 2 TIMES DAILY
Qty: 180 TABLET | Refills: 3 | Status: SHIPPED | OUTPATIENT
Start: 2025-07-29

## 2025-07-29 RX ORDER — MEMANTINE HYDROCHLORIDE 10 MG/1
10 TABLET ORAL 2 TIMES DAILY
Qty: 180 TABLET | Refills: 3 | Status: SHIPPED | OUTPATIENT
Start: 2025-07-29

## 2025-07-31 ENCOUNTER — TELEPHONE (OUTPATIENT)
Dept: PHYSICAL THERAPY | Facility: CLINIC | Age: 80
End: 2025-07-31

## 2025-07-31 NOTE — TELEPHONE ENCOUNTER
Caller: Mango Ling    Relationship: Self         What was the call regarding: WIFE SAID GOING TO HOLD OFF ON PT FOR RIGHT NOW

## (undated) DEVICE — CATH DIAG IMPULSE FR4 5F 125CM

## (undated) DEVICE — LARGE BORE 60 CC SYRINGE: Brand: LARGE BORE 60 CC SYRINGE

## (undated) DEVICE — TRIEVER 20 CATHETER, 20 FR, 105 CM: Brand: TRIEVER 20 CURVE

## (undated) DEVICE — SOL IRR NACL 0.9PCT ARTHROMATIC 3000ML

## (undated) DEVICE — GW AMPLTZ SUPERSTIFF SHT/TPR STR .035IN 260CM

## (undated) DEVICE — PK CYSTO 50

## (undated) DEVICE — CATH FOL SIMPLASTIC 3WY 24F 30CC

## (undated) DEVICE — ST DIL 8TO20F

## (undated) DEVICE — SYRINGE,TOOMEY,IRRIGATION,70CC,STERILE: Brand: MEDLINE

## (undated) DEVICE — SHT AIR TRANSFR COMFRT GLIDE LAT 40X80IN

## (undated) DEVICE — BALN CATH PRESS WEDGE 6F 110CM

## (undated) DEVICE — INTRI24™ INTRODUCER SHEATH (SHEATH): Brand: INTRI24 INTRODUCER SHEATH

## (undated) DEVICE — GW WWIJ35260 WHOLEY WIRE V04: Brand: WHOLEY™

## (undated) DEVICE — PREP SPRY PVPI 10P 2OZ

## (undated) DEVICE — FIBR LASR HOLMIUM 1000 MICRON DISP

## (undated) DEVICE — SOL IRRG H2O BG 3000ML STRL

## (undated) DEVICE — FLOWSAVER: Brand: FLOWSAVER

## (undated) DEVICE — SOLUTION,WATER,IRRIGATION,1000ML,STERILE: Brand: MEDLINE

## (undated) DEVICE — PK TRY HEART CATH 50

## (undated) DEVICE — BAG,DRAINAGE,4L,A/R TOWER,LL,SLIDE TAP: Brand: MEDLINE

## (undated) DEVICE — ELECTRD LP CUT 24/26F YEL

## (undated) DEVICE — PAD, GROUNDING, UNIVERSAL, SPLIT, 9': Brand: MEDLINE

## (undated) DEVICE — Device: Brand: TRIEVER24

## (undated) DEVICE — FLOWSTASIS SINGLE-PACK: Brand: FLOWSTASIS SINGLE-PACK

## (undated) DEVICE — KT SURG TURNOVER 050

## (undated) DEVICE — GLV SURG SIGNATURE ESSENTIAL PF LTX SZ7

## (undated) DEVICE — PINNACLE INTRODUCER SHEATH: Brand: PINNACLE

## (undated) DEVICE — SOL IRR SORBITOL 3PCT BG 3000ML

## (undated) DEVICE — GW WHOLEY HITORQ MOD/J .035 145CM

## (undated) DEVICE — CP INARI VTE